# Patient Record
Sex: MALE | Race: WHITE | NOT HISPANIC OR LATINO | Employment: OTHER | URBAN - METROPOLITAN AREA
[De-identification: names, ages, dates, MRNs, and addresses within clinical notes are randomized per-mention and may not be internally consistent; named-entity substitution may affect disease eponyms.]

---

## 2023-03-24 DIAGNOSIS — E78.5 HYPERLIPIDEMIA, UNSPECIFIED: ICD-10-CM

## 2023-03-24 RX ORDER — ROSUVASTATIN CALCIUM 40 MG/1
40 TABLET, COATED ORAL DAILY
Qty: 90 TABLET | Refills: 3 | Status: SHIPPED | OUTPATIENT
Start: 2023-03-24 | End: 2024-03-23

## 2023-03-27 DIAGNOSIS — M10.9 GOUT DUE TO HYPERURICEMIA ASSOCIATED WITH MUTATION IN HPRT1 GENE: ICD-10-CM

## 2023-03-27 RX ORDER — FEBUXOSTAT 40 MG/1
40 TABLET, FILM COATED ORAL DAILY
COMMUNITY
End: 2023-03-27 | Stop reason: SDUPTHER

## 2023-03-28 RX ORDER — FEBUXOSTAT 40 MG/1
40 TABLET, FILM COATED ORAL DAILY
Qty: 90 TABLET | Refills: 0 | Status: SHIPPED | OUTPATIENT
Start: 2023-03-28 | End: 2023-11-20 | Stop reason: SDUPTHER

## 2023-03-30 ENCOUNTER — APPOINTMENT (OUTPATIENT)
Dept: LAB | Facility: LAB | Age: 73
End: 2023-03-30
Payer: MEDICARE

## 2023-03-30 LAB — THYROTROPIN (MIU/L) IN SER/PLAS BY DETECTION LIMIT <= 0.05 MIU/L: 0.78 MIU/L (ref 0.44–3.98)

## 2023-04-06 DIAGNOSIS — M10.9 GOUT DUE TO HYPERURICEMIA ASSOCIATED WITH MUTATION IN HPRT1 GENE: ICD-10-CM

## 2023-04-24 ENCOUNTER — PATIENT OUTREACH (OUTPATIENT)
Dept: PRIMARY CARE | Facility: CLINIC | Age: 73
End: 2023-04-24
Payer: MEDICARE

## 2023-04-24 DIAGNOSIS — M10.00 IDIOPATHIC GOUT, UNSPECIFIED CHRONICITY, UNSPECIFIED SITE: ICD-10-CM

## 2023-04-24 DIAGNOSIS — E87.20 METABOLIC ACIDOSIS: ICD-10-CM

## 2023-04-24 DIAGNOSIS — E78.5 HYPERLIPIDEMIA, UNSPECIFIED HYPERLIPIDEMIA TYPE: ICD-10-CM

## 2023-04-24 DIAGNOSIS — N19 UREMIA: ICD-10-CM

## 2023-04-24 DIAGNOSIS — N18.5 CKD (CHRONIC KIDNEY DISEASE), STAGE V (MULTI): ICD-10-CM

## 2023-04-24 RX ORDER — ASPIRIN 81 MG/1
81 TABLET ORAL DAILY
COMMUNITY

## 2023-04-24 RX ORDER — LEVOTHYROXINE SODIUM 150 UG/1
150 TABLET ORAL
COMMUNITY
End: 2023-08-16 | Stop reason: SDUPTHER

## 2023-04-24 RX ORDER — LABETALOL 100 MG/1
150 TABLET, FILM COATED ORAL 2 TIMES DAILY
COMMUNITY

## 2023-04-24 RX ORDER — SODIUM BICARBONATE 650 MG/1
650 TABLET ORAL 2 TIMES DAILY
COMMUNITY

## 2023-04-24 RX ORDER — DOXAZOSIN 1 MG/1
1 TABLET ORAL NIGHTLY
COMMUNITY

## 2023-04-24 RX ORDER — FUROSEMIDE 80 MG/1
80 TABLET ORAL EVERY OTHER DAY
COMMUNITY

## 2023-04-24 RX ORDER — AMLODIPINE BESYLATE 10 MG/1
10 TABLET ORAL DAILY
COMMUNITY
End: 2023-07-24

## 2023-04-24 RX ORDER — FERROUS SULFATE, DRIED 160(50) MG
1 TABLET, EXTENDED RELEASE ORAL DAILY
COMMUNITY
End: 2023-10-02 | Stop reason: ALTCHOICE

## 2023-04-24 RX ORDER — SEVELAMER CARBONATE 800 MG/1
800 TABLET, FILM COATED ORAL
COMMUNITY

## 2023-04-24 NOTE — PROGRESS NOTES
Engagement  Call Start Time: 1029 (4/24/2023 10:36 AM)    Medications  Medications reviewed with patient/caregiver?: Yes (4/24/2023 10:36 AM)  Is the patient having any side effects they believe may be caused by any medication additions or changes?: No (4/24/2023 10:36 AM)  Does the patient have all medications ordered at discharge?: Yes (4/24/2023 10:36 AM)  Prescription Comments: see med list (4/24/2023 10:36 AM)  Is the patient taking all medications as directed (includes completed medication regime)?: Yes (4/24/2023 10:36 AM)  Care Management Interventions: Advised patient to call provider; Notified provider (Pt needs a refill script for Sevelamer carbonate 800mg) (4/24/2023 10:36 AM)  Medication Comments: see med list (4/24/2023 10:36 AM)  Follow Up Tasks: Script issues (4/24/2023 10:36 AM)    Appointments  Does the patient have a primary care provider?: Yes (4/24/2023 10:36 AM)  Care Management Interventions: Verified appointment date/time/provider (4/24/2023 10:36 AM)  Has the patient kept scheduled appointments due by today?: Yes (4/24/2023 10:36 AM)  Care Management Interventions: Advised patient to keep appointment (4/24/2023 10:36 AM)    Patient Teaching  Does the patient have access to their discharge instructions?: Yes (4/24/2023 10:36 AM)  Care Management Interventions: Reviewed instructions with patient (4/24/2023 10:36 AM)  What is the patient's perception of their health status since discharge?: Improving (4/24/2023 10:36 AM)    Discharge Facility:[facility]  Discharge Diagnosis:Uremia, Hyperlipidemia, Gout, Metabolic Acidosis, CKD 5  Admission Date:4/19/23  Discharge Date: 4/22/23    PCP Appointment Date:4/25/23  Specialist Appointment Date:   Hospital Encounter and Summary: not available at this time   See discharge assessment below for further details

## 2023-04-25 LAB — THYROTROPIN (MIU/L) IN SER/PLAS BY DETECTION LIMIT <= 0.05 MIU/L: 0.61 MIU/L (ref 0.44–3.98)

## 2023-04-28 ENCOUNTER — TELEPHONE (OUTPATIENT)
Dept: PRIMARY CARE | Facility: CLINIC | Age: 73
End: 2023-04-28
Payer: MEDICARE

## 2023-04-28 NOTE — TELEPHONE ENCOUNTER
Left message for patianjumnt----- Message from Christopher D'Amico, DO sent at 4/25/2023 12:45 PM EDT -----  Labs unremarkable.

## 2023-05-11 NOTE — PROGRESS NOTES
Unable to reach patient for call back after patient's follow up appointment with PCP.  LVM  with call back number for patient to call if needed.

## 2023-07-22 DIAGNOSIS — I10 HYPERTENSION, UNSPECIFIED TYPE: Primary | ICD-10-CM

## 2023-07-24 RX ORDER — AMLODIPINE BESYLATE 10 MG/1
10 TABLET ORAL DAILY
Qty: 90 TABLET | Refills: 1 | Status: SHIPPED | OUTPATIENT
Start: 2023-07-24 | End: 2024-01-20

## 2023-08-03 ENCOUNTER — APPOINTMENT (OUTPATIENT)
Dept: LAB | Facility: LAB | Age: 73
End: 2023-08-03
Payer: MEDICARE

## 2023-08-03 LAB
CYTOMEGALOVIRUS IGG ANTIBODY: NONREACTIVE
EBV INTERPRETATION: ABNORMAL
EPSTEIN-BARR VCA IGG: POSITIVE
EPSTEIN-BARR VCA IGM: NEGATIVE
EPSTEIN-BARR VIRUS EARLY ANTIGEN ANTIBODY, IGG: NEGATIVE
EPSTIEN-BARR NUCLEAR ANTIGEN AB: POSITIVE
ESTIMATED AVERAGE GLUCOSE FOR HBA1C: 82 MG/DL
HEMOGLOBIN A1C/HEMOGLOBIN TOTAL IN BLOOD: 4.5 %
HEPATITIS B VIRUS CORE AB (PRESENCE) IN SER/PLAS BY IMM: NONREACTIVE
HEPATITIS B VIRUS SURFACE AB (MIU/ML) IN SERUM: <3.1 MIU/ML
HEPATITIS B VIRUS SURFACE AG PRESENCE IN SERUM: NONREACTIVE
HEPATITIS C VIRUS AB PRESENCE IN SERUM: NONREACTIVE
HIV 1/ 2 AG/AB SCREEN: NONREACTIVE
PROSTATE SPECIFIC AG (NG/ML) IN SER/PLAS: 4.4 NG/ML (ref 0–4)
SYPHILIS TOTAL AB: NONREACTIVE

## 2023-08-07 LAB
AMPHETAMINE SCREEN BLOOD: NEGATIVE NG/ML
BARBITURATE SCREEN BLOOD: NEGATIVE NG/ML
BENZODIAZEPINES SCREEN BLOOD: NEGATIVE NG/ML
BUPRENORPHINE SCREEN BLOOD: NEGATIVE NG/ML
CANNABINOIDS SCREEN BLOOD: NEGATIVE NG/ML
COCAINE SCREEN BLOOD: NEGATIVE NG/ML
COTININE BLOOD QUANTITATIVE: <5 NG/ML
DRUG SCREEN COMMENT BLOOD: NORMAL
METHADONE SCREEN BLOOD: NEGATIVE NG/ML
METHAMPHETAMINE, BLOOD, SCREEN: NEGATIVE NG/ML
NICOTINE BLOOD QUANTITATIVE: <5 NG/ML
NIL(NEG) CONTROL SPOT COUNT: NORMAL
OPIATE SCREEN BLOOD: NEGATIVE NG/ML
OXYCODONE SCREEN BLOOD: NEGATIVE NG/ML
PANEL A SPOT COUNT: 0
PANEL B SPOT COUNT: 0
PHENCYCLIDINE SCREEN BLOOD: NEGATIVE NG/ML
POS CONTROL SPOT COUNT: NORMAL
T-SPOT. TB INTERPRETATION: NEGATIVE

## 2023-08-16 DIAGNOSIS — E03.9 HYPOTHYROIDISM, UNSPECIFIED TYPE: ICD-10-CM

## 2023-08-16 RX ORDER — LEVOTHYROXINE SODIUM 150 UG/1
150 TABLET ORAL
Qty: 90 TABLET | Refills: 1 | Status: SHIPPED | OUTPATIENT
Start: 2023-08-16 | End: 2024-02-07

## 2023-09-14 DIAGNOSIS — E58 CALCIUM DEFICIENCY: ICD-10-CM

## 2023-09-14 RX ORDER — CALCITRIOL 0.25 UG/1
0.25 CAPSULE ORAL 2 TIMES DAILY
Qty: 60 CAPSULE | Refills: 11 | Status: SHIPPED | OUTPATIENT
Start: 2023-09-14

## 2023-09-14 RX ORDER — CALCITRIOL 0.25 UG/1
CAPSULE ORAL EVERY 24 HOURS
COMMUNITY
End: 2023-09-14 | Stop reason: SDUPTHER

## 2023-09-25 DIAGNOSIS — Z00.00 HEALTHCARE MAINTENANCE: ICD-10-CM

## 2023-09-25 RX ORDER — CLOBETASOL PROPIONATE 0.5 MG/G
CREAM TOPICAL 3 TIMES DAILY
COMMUNITY
Start: 2022-10-25 | End: 2023-09-25 | Stop reason: SDUPTHER

## 2023-09-26 RX ORDER — CLOBETASOL PROPIONATE 0.5 MG/G
CREAM TOPICAL 3 TIMES DAILY
Qty: 60 G | Refills: 0 | Status: ON HOLD | OUTPATIENT
Start: 2023-09-26 | End: 2023-10-30

## 2023-10-02 ENCOUNTER — TELEPHONE (OUTPATIENT)
Dept: GASTROENTEROLOGY | Facility: HOSPITAL | Age: 73
End: 2023-10-02
Payer: MEDICARE

## 2023-10-02 VITALS — HEIGHT: 66 IN | BODY MASS INDEX: 24.91 KG/M2 | WEIGHT: 155 LBS

## 2023-10-02 RX ORDER — CHOLECALCIFEROL (VITAMIN D3) 50 MCG
50 TABLET ORAL DAILY
COMMUNITY

## 2023-10-03 ENCOUNTER — LAB REQUISITION (OUTPATIENT)
Dept: LAB | Facility: CLINIC | Age: 73
End: 2023-10-03
Payer: MEDICARE

## 2023-10-03 DIAGNOSIS — N18.6 END STAGE RENAL DISEASE (MULTI): ICD-10-CM

## 2023-10-04 PROBLEM — H91.90 HEARING DEFICIT: Status: ACTIVE | Noted: 2023-10-04

## 2023-10-04 PROBLEM — H93.13 SUBJECTIVE TINNITUS OF BOTH EARS: Status: ACTIVE | Noted: 2023-10-04

## 2023-10-04 PROBLEM — B02.9 SHINGLES: Status: ACTIVE | Noted: 2023-10-04

## 2023-10-04 PROBLEM — N18.6 ESRD (END STAGE RENAL DISEASE) (MULTI): Status: ACTIVE | Noted: 2023-10-04

## 2023-10-04 PROBLEM — R04.0 EPISTAXIS: Status: ACTIVE | Noted: 2023-10-04

## 2023-10-04 PROBLEM — R39.9 LOWER URINARY TRACT SYMPTOMS (LUTS): Status: ACTIVE | Noted: 2023-10-04

## 2023-10-04 PROBLEM — H52.03 HYPEROPIA OF BOTH EYES: Status: ACTIVE | Noted: 2023-10-04

## 2023-10-04 PROBLEM — H02.839: Status: ACTIVE | Noted: 2023-10-04

## 2023-10-04 PROBLEM — F17.201 NICOTINE DEPENDENCE IN REMISSION: Status: ACTIVE | Noted: 2023-10-04

## 2023-10-04 PROBLEM — E83.30 DISORDER OF PHOSPHORUS METABOLISM: Status: ACTIVE | Noted: 2023-10-04

## 2023-10-04 PROBLEM — H43.813 PVD (POSTERIOR VITREOUS DETACHMENT), BOTH EYES: Status: ACTIVE | Noted: 2023-10-04

## 2023-10-04 PROBLEM — N18.5 STAGE 5 CHRONIC KIDNEY DISEASE (MULTI): Status: ACTIVE | Noted: 2023-10-04

## 2023-10-04 PROBLEM — R35.1 BENIGN PROSTATIC HYPERPLASIA WITH NOCTURIA: Status: ACTIVE | Noted: 2023-10-04

## 2023-10-04 PROBLEM — H52.4 BILATERAL PRESBYOPIA: Status: ACTIVE | Noted: 2023-10-04

## 2023-10-04 PROBLEM — H25.13 AGE-RELATED NUCLEAR CATARACT OF BOTH EYES: Status: ACTIVE | Noted: 2023-10-04

## 2023-10-04 PROBLEM — E55.9 VITAMIN D DEFICIENCY: Status: ACTIVE | Noted: 2023-10-04

## 2023-10-04 PROBLEM — N18.4 STAGE 4 CHRONIC KIDNEY DISEASE (MULTI): Status: ACTIVE | Noted: 2023-10-04

## 2023-10-04 PROBLEM — N19 UREMIA: Status: ACTIVE | Noted: 2023-10-04

## 2023-10-04 PROBLEM — Z94.9 TRANSPLANT: Status: ACTIVE | Noted: 2023-10-04

## 2023-10-04 PROBLEM — H54.7 DECREASED VISUAL ACUITY: Status: ACTIVE | Noted: 2023-10-04

## 2023-10-04 PROBLEM — N18.6 END-STAGE RENAL DISEASE ON HEMODIALYSIS (MULTI): Status: ACTIVE | Noted: 2023-10-04

## 2023-10-04 PROBLEM — H35.372 EPIRETINAL MEMBRANE (ERM) OF LEFT EYE: Status: ACTIVE | Noted: 2023-10-04

## 2023-10-04 PROBLEM — Z85.72 HISTORY OF CUTANEOUS T-CELL LYMPHOMA: Status: ACTIVE | Noted: 2023-10-04

## 2023-10-04 PROBLEM — M10.9 GOUT: Status: ACTIVE | Noted: 2023-10-04

## 2023-10-04 PROBLEM — I10 ESSENTIAL HYPERTENSION: Status: ACTIVE | Noted: 2023-10-04

## 2023-10-04 PROBLEM — N40.1 BENIGN PROSTATIC HYPERPLASIA WITH NOCTURIA: Status: ACTIVE | Noted: 2023-10-04

## 2023-10-04 PROBLEM — J84.10 CALCIFIED GRANULOMA OF LUNG (MULTI): Status: ACTIVE | Noted: 2023-10-04

## 2023-10-04 PROBLEM — E03.9 HYPOTHYROIDISM: Status: ACTIVE | Noted: 2023-10-04

## 2023-10-04 PROBLEM — Z99.2 END-STAGE RENAL DISEASE ON HEMODIALYSIS (MULTI): Status: ACTIVE | Noted: 2023-10-04

## 2023-10-04 PROBLEM — E78.5 HLD (HYPERLIPIDEMIA): Status: ACTIVE | Noted: 2023-10-04

## 2023-10-04 PROBLEM — N25.81 SECONDARY HYPERPARATHYROIDISM OF RENAL ORIGIN (MULTI): Status: ACTIVE | Noted: 2023-10-04

## 2023-10-04 PROBLEM — H90.3 BILATERAL SENSORINEURAL HEARING LOSS: Status: ACTIVE | Noted: 2023-10-04

## 2023-10-04 PROBLEM — I77.0 AVF (ARTERIOVENOUS FISTULA) (CMS-HCC): Status: ACTIVE | Noted: 2023-10-04

## 2023-10-04 PROBLEM — D63.8 ANEMIA OF CHRONIC DISEASE: Status: ACTIVE | Noted: 2023-10-04

## 2023-10-04 PROBLEM — J98.4 CALCIFIED GRANULOMA OF LUNG: Status: ACTIVE | Noted: 2023-10-04

## 2023-10-04 PROBLEM — E11.9 TYPE 2 DIABETES MELLITUS WITHOUT COMPLICATION (MULTI): Status: ACTIVE | Noted: 2023-10-04

## 2023-10-04 RX ORDER — ETHYL CHLORIDE 100 %
AEROSOL, SPRAY (ML) TOPICAL
COMMUNITY
Start: 2023-07-10

## 2023-10-04 RX ORDER — FLUOCINOLONE ACETONIDE 0.1 MG/G
CREAM TOPICAL
COMMUNITY

## 2023-10-04 RX ORDER — ALFUZOSIN HYDROCHLORIDE 10 MG/1
10 TABLET, EXTENDED RELEASE ORAL DAILY
COMMUNITY
End: 2024-01-09 | Stop reason: SDUPTHER

## 2023-10-04 RX ORDER — HYDROCORTISONE 25 MG/G
CREAM TOPICAL
COMMUNITY
Start: 2021-08-25

## 2023-10-04 RX ORDER — SODIUM PICOSULFATE, MAGNESIUM OXIDE, AND ANHYDROUS CITRIC ACID 12; 3.5; 1 G/175ML; G/175ML; MG/175ML
LIQUID ORAL
COMMUNITY
Start: 2023-09-28 | End: 2024-02-07 | Stop reason: ALTCHOICE

## 2023-10-04 RX ORDER — EPLERENONE 25 MG/1
1 TABLET, FILM COATED ORAL DAILY
COMMUNITY
Start: 2022-06-21

## 2023-10-04 RX ORDER — HYDROCORTISONE 10 MG/ML
LOTION TOPICAL
COMMUNITY
Start: 2022-10-25

## 2023-10-04 RX ORDER — FERROUS SULFATE 325(65) MG
1 TABLET ORAL
COMMUNITY

## 2023-10-04 RX ORDER — VALSARTAN 160 MG/1
1 TABLET ORAL DAILY
COMMUNITY
Start: 2021-03-19

## 2023-10-05 ENCOUNTER — ANESTHESIA (OUTPATIENT)
Dept: GASTROENTEROLOGY | Facility: HOSPITAL | Age: 73
End: 2023-10-05
Payer: MEDICARE

## 2023-10-05 ENCOUNTER — HOSPITAL ENCOUNTER (OUTPATIENT)
Dept: GASTROENTEROLOGY | Facility: HOSPITAL | Age: 73
Discharge: HOME | End: 2023-10-05
Attending: INTERNAL MEDICINE | Admitting: INTERNAL MEDICINE
Payer: MEDICARE

## 2023-10-05 ENCOUNTER — ANESTHESIA EVENT (OUTPATIENT)
Dept: GASTROENTEROLOGY | Facility: HOSPITAL | Age: 73
End: 2023-10-05
Payer: MEDICARE

## 2023-10-05 VITALS
HEIGHT: 66 IN | DIASTOLIC BLOOD PRESSURE: 59 MMHG | TEMPERATURE: 97.9 F | SYSTOLIC BLOOD PRESSURE: 130 MMHG | RESPIRATION RATE: 12 BRPM | BODY MASS INDEX: 24.91 KG/M2 | HEART RATE: 59 BPM | OXYGEN SATURATION: 99 % | WEIGHT: 155 LBS

## 2023-10-05 DIAGNOSIS — Z12.11 ENCOUNTER FOR SCREENING FOR MALIGNANT NEOPLASM OF COLON: Primary | ICD-10-CM

## 2023-10-05 DIAGNOSIS — Z86.010 PERSONAL HISTORY OF COLONIC POLYPS: ICD-10-CM

## 2023-10-05 PROCEDURE — A45378 PR COLONOSCOPY,DIAGNOSTIC: Performed by: ANESTHESIOLOGY

## 2023-10-05 PROCEDURE — 3700000002 HC GENERAL ANESTHESIA TIME - EACH INCREMENTAL 1 MINUTE

## 2023-10-05 PROCEDURE — 7100000009 HC PHASE TWO TIME - INITIAL BASE CHARGE

## 2023-10-05 PROCEDURE — 45385 COLONOSCOPY W/LESION REMOVAL: CPT | Performed by: INTERNAL MEDICINE

## 2023-10-05 PROCEDURE — 88305 TISSUE EXAM BY PATHOLOGIST: CPT | Mod: TC,SUR,AHULAB | Performed by: INTERNAL MEDICINE

## 2023-10-05 PROCEDURE — 3700000001 HC GENERAL ANESTHESIA TIME - INITIAL BASE CHARGE

## 2023-10-05 PROCEDURE — 88305 TISSUE EXAM BY PATHOLOGIST: CPT | Mod: TC,AHULAB | Performed by: INTERNAL MEDICINE

## 2023-10-05 PROCEDURE — 99100 ANES PT EXTEME AGE<1 YR&>70: CPT | Performed by: ANESTHESIOLOGY

## 2023-10-05 PROCEDURE — A45378 PR COLONOSCOPY,DIAGNOSTIC: Performed by: NURSE ANESTHETIST, CERTIFIED REGISTERED

## 2023-10-05 PROCEDURE — 7100000010 HC PHASE TWO TIME - EACH INCREMENTAL 1 MINUTE

## 2023-10-05 PROCEDURE — 2500000004 HC RX 250 GENERAL PHARMACY W/ HCPCS (ALT 636 FOR OP/ED): Performed by: NURSE ANESTHETIST, CERTIFIED REGISTERED

## 2023-10-05 PROCEDURE — 3600000007 HC OR TIME - EACH INCREMENTAL 1 MINUTE - PROCEDURE LEVEL TWO

## 2023-10-05 PROCEDURE — 88305 TISSUE EXAM BY PATHOLOGIST: CPT | Performed by: PATHOLOGY

## 2023-10-05 RX ORDER — SODIUM CHLORIDE, SODIUM LACTATE, POTASSIUM CHLORIDE, CALCIUM CHLORIDE 600; 310; 30; 20 MG/100ML; MG/100ML; MG/100ML; MG/100ML
20 INJECTION, SOLUTION INTRAVENOUS CONTINUOUS
Status: CANCELLED | OUTPATIENT
Start: 2023-10-05

## 2023-10-05 RX ORDER — PROPOFOL 10 MG/ML
INJECTION, EMULSION INTRAVENOUS CONTINUOUS PRN
Status: DISCONTINUED | OUTPATIENT
Start: 2023-10-05 | End: 2023-10-05

## 2023-10-05 RX ORDER — PROPOFOL 10 MG/ML
INJECTION, EMULSION INTRAVENOUS AS NEEDED
Status: DISCONTINUED | OUTPATIENT
Start: 2023-10-05 | End: 2023-10-05

## 2023-10-05 RX ADMIN — SODIUM CHLORIDE: 9 INJECTION, SOLUTION INTRAVENOUS at 13:14

## 2023-10-05 RX ADMIN — PROPOFOL 40 MG: 10 INJECTION, EMULSION INTRAVENOUS at 13:27

## 2023-10-05 RX ADMIN — PROPOFOL 200 MCG/KG/MIN: 10 INJECTION, EMULSION INTRAVENOUS at 13:27

## 2023-10-05 ASSESSMENT — PAIN SCALES - GENERAL
PAINLEVEL_OUTOF10: 0 - NO PAIN

## 2023-10-05 ASSESSMENT — PAIN - FUNCTIONAL ASSESSMENT
PAIN_FUNCTIONAL_ASSESSMENT: UNABLE TO SELF-REPORT
PAIN_FUNCTIONAL_ASSESSMENT: 0-10
PAIN_FUNCTIONAL_ASSESSMENT: 0-10

## 2023-10-05 NOTE — DISCHARGE INSTRUCTIONS

## 2023-10-05 NOTE — ANESTHESIA POSTPROCEDURE EVALUATION
Patient: Salazar Jesus    Procedure Summary       Date: 10/05/23 Room / Location[place] [facility] [place]    Anesthesia Start: 1314 Anesthesia Stop: 1408    Procedure: COLONOSCOPY Diagnosis:       Personal history of colonic polyps      Encounter for screening for malignant neoplasm of colon    Scheduled Providers: Terry Hutson MD; Mike Hagan MD; Beni Blanchard RN; Courtney Guerra MA Responsible Provider: Mike Hagan MD    Anesthesia Type: MAC ASA Status: 3            Anesthesia Type: MAC    Vitals Value Taken Time   /59 10/05/23 1430   Temp 36.6 °C (97.9 °F) 10/05/23 1404   Pulse 59 10/05/23 1430   Resp 12 10/05/23 1430   SpO2 99 % 10/05/23 1430       Anesthesia Post Evaluation    Patient location during evaluation: PACU  Patient participation: complete - patient participated  Level of consciousness: awake and alert  Pain management: satisfactory to patient  Multimodal analgesia pain management approach  Airway patency: patent  Cardiovascular status: acceptable and blood pressure returned to baseline  Respiratory status: acceptable  Hydration status: acceptable        No notable events documented.

## 2023-10-05 NOTE — H&P
GASTROENTEROLOGY PRE-PROCEDURE H&P    HPI:  Salazar Jesus is a 73 y.o. male here for surveillance colonoscopy for personal history of adenomatous colon polyps.    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    SOCIAL HISTORY  Social History     Tobacco Use    Smoking status: Never    Smokeless tobacco: Never   Substance Use Topics    Alcohol use: Not on file       REVIEW OF SYSTEMS  A 10+ point review of systems was completed and was otherwise negative.    ALLERGIES  Allergies   Allergen Reactions    Allopurinol Itching       MEDICATIONS  Current Outpatient Medications   Medication Instructions    alfuzosin (UROXATRAL) 10 mg, oral, Daily    amLODIPine (NORVASC) 10 mg, oral, Daily    aspirin 81 mg, oral, Daily    calcitriol (ROCALTROL) 0.25 mcg, oral, 2 times daily    cholecalciferol (VITAMIN D3) 50 mcg, oral, Daily    Clenpiq 10 mg-3.5 gram- 12 gram/175 mL solution PLEASE SEE ATTACHED FOR DETAILED DIRECTIONS    clobetasol (Temovate) 0.05 % cream Topical, 3 times daily    doxazosin (CARDURA) 1 mg, oral, Nightly    eplerenone (Inspra) 25 mg tablet 1 tablet, oral, Daily    ethyl chloride 100 % spray 1 hour before dialysis    febuxostat (ULORIC) 40 mg, oral, Daily    ferrous sulfate 325 (65 Fe) MG tablet 1 tablet, oral    fluocinolone 0.01 % cream APPLY TO AFFECTED AREAS ONCE A DAY ON THIGHS AND LEGS X 2 WEEKS THEN AS NEEDED FOR FLARES    furosemide (LASIX) 80 mg, oral, Every  other day    hydrocortisone 1 % lotion APPLY SPARINGLY AND RUB IN WELL TO  AFFECTED AREA(S) AS DIRECTED.    hydrocortisone 2.5 % cream Hydrocortisone 2.5 % External Cream  Quantity: 56   Refills: 0  Start: 25-Aug-2021    labetalol (NORMODYNE) 150 mg, oral, 2 times daily    levothyroxine (SYNTHROID, LEVOXYL) 150 mcg, oral, Daily before breakfast    rosuvastatin (CRESTOR) 40 mg, oral, Daily    sevelamer carbonate (RENVELA) 800 mg, oral, 3 times daily with meals, Swallow tablet whole; do not crush, break, or chew.    sodium bicarbonate 650 mg, oral, 2 times daily    valsartan (Diovan) 160 mg tablet 1 tablet, oral, Daily       VITALS  There were no vitals taken for this visit.     PHYSICAL EXAM  CONSTITUTIONAL: no acute distress, appears stated age  PULMONARY: clear to auscultation bilaterally  CARDIOVASCULAR: regular rate and rhythm  ABDOMEN: soft, non-tender  NEUROLOGIC: alert and oriented to person/place/time     ASSESSMENT/PLAN    Proceed with colonoscopy as scheduled.    Signature: Terry Hutson MD

## 2023-10-05 NOTE — ANESTHESIA PREPROCEDURE EVALUATION
Patient: Salazar Jesus    Procedure Information       Date/Time: 10/05/23 1300    Scheduled providers: Terry Hutson MD; Mike Hagan MD    Procedure: COLONOSCOPY    Location: [facility]            Relevant Problems   Cardiovascular   (+) Essential hypertension   (+) HLD (hyperlipidemia)      Endocrine   (+) Hypothyroidism   (+) Secondary hyperparathyroidism of renal origin (CMS/HCC)   (+) Type 2 diabetes mellitus without complication (CMS/HCC)      /Renal   (+) ESRD (end stage renal disease) (CMS/[facility])   (+) End-stage renal disease on hemodialysis (CMS/[facility])      Hematology   (+) Anemia of chronic disease      Eyes, Ears, Nose, and Throat   (+) Bilateral sensorineural hearing loss   (+) Hearing deficit      Infectious Disease   (+) Shingles       Clinical information reviewed:   Tobacco  Allergies  Meds   Med Hx  Surg Hx   Fam Hx  Soc Hx        NPO/Void Status  Date of Last Liquid: 10/05/23  Time of Last Liquid: 1000  Date of Last Solid: 10/03/23  Time of Last Solid: 1900  Time of Last Void: 1212           Past Medical History:   Diagnosis Date    BPH (benign prostatic hyperplasia)     Dental crown present     loose; on upper left incisor    ESRD (end stage renal disease) on dialysis (CMS/[facility])     Dialysis MWF    Gout     HTN (hypertension)     Hyperlipidemia     Hypothyroidism     ALLISON on CPAP       Past Surgical History:   Procedure Laterality Date    KNEE CARTILAGE SURGERY      Knee surgery    ROTATOR CUFF REPAIR  2017    THROAT SURGERY      w/ broken jaw    WISDOM TOOTH EXTRACTION Bilateral      Social History     Tobacco Use    Smoking status: Never    Smokeless tobacco: Never   Vaping Use    Vaping Use: Unknown   Substance Use Topics    Alcohol use: Not Currently    Drug use: Never      Current Outpatient Medications   Medication Instructions    alfuzosin (UROXATRAL) 10 mg, oral, Daily    amLODIPine (NORVASC) 10 mg, oral, Daily    aspirin 81 mg, oral, Daily    calcitriol (ROCALTROL)  0.25 mcg, oral, 2 times daily    cholecalciferol (VITAMIN D3) 50 mcg, oral, Daily    Clenpiq 10 mg-3.5 gram- 12 gram/175 mL solution PLEASE SEE ATTACHED FOR DETAILED DIRECTIONS    clobetasol (Temovate) 0.05 % cream Topical, 3 times daily    doxazosin (CARDURA) 1 mg, oral, Nightly    eplerenone (Inspra) 25 mg tablet 1 tablet, oral, Daily    ethyl chloride 100 % spray 1 hour before dialysis    febuxostat (ULORIC) 40 mg, oral, Daily    ferrous sulfate 325 (65 Fe) MG tablet 1 tablet, oral    fluocinolone 0.01 % cream APPLY TO AFFECTED AREAS ONCE A DAY ON THIGHS AND LEGS X 2 WEEKS THEN AS NEEDED FOR FLARES    furosemide (LASIX) 80 mg, oral, Every other day    hydrocortisone 1 % lotion APPLY SPARINGLY AND RUB IN WELL TO  AFFECTED AREA(S) AS DIRECTED.    hydrocortisone 2.5 % cream Hydrocortisone 2.5 % External Cream  Quantity: 56   Refills: 0  Start: 25-Aug-2021    labetalol (NORMODYNE) 150 mg, oral, 2 times daily    levothyroxine (SYNTHROID, LEVOXYL) 150 mcg, oral, Daily before breakfast    rosuvastatin (CRESTOR) 40 mg, oral, Daily    sevelamer carbonate (RENVELA) 800 mg, oral, 3 times daily with meals, Swallow tablet whole; do not crush, break, or chew.    sodium bicarbonate 650 mg, oral, 2 times daily    valsartan (Diovan) 160 mg tablet 1 tablet, oral, Daily      Allergies   Allergen Reactions    Allopurinol Itching        Chemistry    Lab Results   Component Value Date/Time     04/22/2023 0639    K 3.7 04/22/2023 0639     04/22/2023 0639    CO2 24 04/22/2023 0639    BUN 41 (H) 04/22/2023 0639    CREATININE 5.2 (H) 04/22/2023 0639    Lab Results   Component Value Date/Time    CALCIUM 9.5 04/22/2023 0639    ALKPHOS 59 04/19/2023 0538    AST 24 04/19/2023 0538    ALT 23 04/19/2023 0538    BILITOT 0.4 04/19/2023 0538          Lab Results   Component Value Date/Time    WBC 5.2 04/22/2023 0639    HGB 9.1 (L) 04/22/2023 0639    HCT 26.8 (L) 04/22/2023 0639     (L) 04/22/2023 0639     Lab Results   Component  Value Date/Time    PROTIME 10.5 04/21/2023 0540    INR 1.0 04/21/2023 0540     No results found for this or any previous visit (from the past 4464 hour(s)).  No results found for this or any previous visit from the past 1095 days.   Echo 8/3/2023:  Left Ventricle: The left ventricular systolic function is normal, with an estimated ejection fraction of 60-65%. There are no regional wall motion abnormalities. The left ventricular cavity size is normal. Spectral Doppler shows an impaired relaxation pattern of left ventricular diastolic filling.  Left Atrium: The left atrium is normal in size.  Right Ventricle: The right ventricle is normal in size. There is normal right ventricular global systolic function.  Right Atrium: The right atrium is normal in size.  Aortic Valve: The aortic valve appears structurally normal. There is trivial aortic valve regurgitation. The peak instantaneous gradient of the aortic valve is 6.6 mmHg.  Mitral Valve: The mitral valve is normal in structure. There is trace mitral valve regurgitation.  Tricuspid Valve: The tricuspid valve is structurally normal. No evidence of tricuspid regurgitation.  Pulmonic Valve: The pulmonic valve is structurally normal. There is no indication of pulmonic valve regurgitation.  Pericardium: There is no pericardial effusion noted.  Aorta: The aortic root is normal.  Systemic Veins: The inferior vena cava appears to be of normal size. There is IVC inspiratory collapse greater than 50%.  In comparison to the previous echocardiogram(s): Compared with study from 6/8/2022, no significant change.  CONCLUSIONS:  1. Left ventricular systolic function is normal with a 60-65% estimated ejection fraction.  2. Spectral Doppler shows an impaired relaxation pattern of left ventricular diastolic filling.     Stress 7/20/2023:  FINDINGS:  Stress and rest images both demonstrate a mild fixed defect along the  inferior wall which appears to move in thickened appropriately.    "  ECG-gated images demonstrate normal LV size and myocardial  contractility with an LV ejection fraction of   64 % (normal above 45  percent).     IMPRESSION:  1. No definite evidence of ischemia or infarct.  2. Likely diaphragmatic attenuation along the inferior wall.  3. Well-maintained left ventricular function with an ejection  fraction of 64%.  4. Normal left ventricular size.         Visit Vitals  BP (!) 155/92   Pulse 88   Temp 36.2 °C (97.2 °F) (Temporal)   Resp 18   Ht 1.676 m (5' 6\")   Wt 70.3 kg (155 lb)   SpO2 100%   BMI 25.02 kg/m²   Smoking Status Never   BSA 1.81 m²        Anesthesia Evaluation      No history of anesthetic complications   Airway   Mallampati: III  TM distance: >3 FB  Neck ROM: full  Dental - normal exam         Pulmonary     breath sounds clear to auscultation  Cardiovascular     Rhythm: regular  Rate: normal    Neuro/Psych      GI/Hepatic/Renal      Endo/Other    Abdominal  - normal exam                      Physical Exam    Airway  Mallampati: III  TM distance: >3 FB  Neck ROM: full     Cardiovascular   Rhythm: regular  Rate: normal     Dental - normal exam       Pulmonary   Breath sounds clear to auscultation     Abdominal - normal exam              Anesthesia Plan    ASA 3     MAC     intravenous induction   Anesthetic plan and risks discussed with patient.       "

## 2023-10-05 NOTE — PERIOPERATIVE NURSING NOTE
Discharge instructions reviewed with patient and wife, verbalized understanding. Waiting for transport.

## 2023-10-10 LAB
LABORATORY COMMENT REPORT: NORMAL
PATH REPORT.FINAL DX SPEC: NORMAL
PATH REPORT.GROSS SPEC: NORMAL
PATH REPORT.TOTAL CANCER: NORMAL

## 2023-10-11 LAB
HLA CLASS I AB SCREEN,FC: NORMAL
HLA CLASS II AB SCREEN,FC: NORMAL
HLA RESULTS: NORMAL

## 2023-10-18 ENCOUNTER — TELEPHONE (OUTPATIENT)
Dept: GASTROENTEROLOGY | Facility: CLINIC | Age: 73
End: 2023-10-18
Payer: MEDICARE

## 2023-10-18 NOTE — TELEPHONE ENCOUNTER
Path Note    Impression  4 subcentimeter polyps were removed  Diverticulosis of mild severity in the sigmoid colon  Small hemorrhoids    A.  Rectum, polypectomies:  - Tubular adenoma.  - Mucosal prolapse polyp.     B.  Sigmoid colon, polypectomies:  - Tubular adenoma.  - Hyperplastic polyp.    Recommend surveillance colonoscopy in 5 (five) years.  Left patient voicemail.

## 2023-10-27 DIAGNOSIS — Z00.00 HEALTHCARE MAINTENANCE: ICD-10-CM

## 2023-10-30 RX ORDER — CLOBETASOL PROPIONATE 0.5 MG/G
CREAM TOPICAL
Qty: 60 G | Refills: 0 | Status: SHIPPED | OUTPATIENT
Start: 2023-10-30

## 2023-11-20 DIAGNOSIS — M10.9 GOUT DUE TO HYPERURICEMIA ASSOCIATED WITH MUTATION IN HPRT1 GENE: ICD-10-CM

## 2023-11-20 RX ORDER — FEBUXOSTAT 40 MG/1
40 TABLET, FILM COATED ORAL DAILY
Qty: 90 TABLET | Refills: 0 | Status: SHIPPED | OUTPATIENT
Start: 2023-11-20 | End: 2024-02-15

## 2023-12-04 PROCEDURE — 86808 CYTOTOXIC ANTIBODY SCREENING: CPT | Mod: OUT | Performed by: SURGERY

## 2023-12-26 ENCOUNTER — LAB REQUISITION (OUTPATIENT)
Dept: LAB | Facility: CLINIC | Age: 73
End: 2023-12-26
Payer: MEDICARE

## 2023-12-26 DIAGNOSIS — N18.6 END STAGE RENAL DISEASE (MULTI): ICD-10-CM

## 2023-12-26 LAB — FREEZE CROSSMATCH: NORMAL

## 2024-01-03 ENCOUNTER — LAB REQUISITION (OUTPATIENT)
Dept: LAB | Facility: CLINIC | Age: 74
End: 2024-01-03
Payer: MEDICARE

## 2024-01-03 DIAGNOSIS — N18.6 END STAGE RENAL DISEASE (MULTI): ICD-10-CM

## 2024-01-09 ENCOUNTER — OFFICE VISIT (OUTPATIENT)
Dept: UROLOGY | Facility: CLINIC | Age: 74
End: 2024-01-09
Payer: MEDICARE

## 2024-01-09 VITALS — HEIGHT: 65 IN | WEIGHT: 150 LBS | TEMPERATURE: 96.8 F | BODY MASS INDEX: 24.99 KG/M2

## 2024-01-09 DIAGNOSIS — R35.1 BENIGN PROSTATIC HYPERPLASIA WITH NOCTURIA: Primary | ICD-10-CM

## 2024-01-09 DIAGNOSIS — N40.1 BENIGN PROSTATIC HYPERPLASIA WITH NOCTURIA: Primary | ICD-10-CM

## 2024-01-09 PROCEDURE — 1036F TOBACCO NON-USER: CPT | Performed by: UROLOGY

## 2024-01-09 PROCEDURE — 1159F MED LIST DOCD IN RCRD: CPT | Performed by: UROLOGY

## 2024-01-09 PROCEDURE — 51798 US URINE CAPACITY MEASURE: CPT | Performed by: UROLOGY

## 2024-01-09 PROCEDURE — 4010F ACE/ARB THERAPY RXD/TAKEN: CPT | Performed by: UROLOGY

## 2024-01-09 PROCEDURE — 99214 OFFICE O/P EST MOD 30 MIN: CPT | Performed by: UROLOGY

## 2024-01-09 PROCEDURE — 51741 ELECTRO-UROFLOWMETRY FIRST: CPT | Performed by: UROLOGY

## 2024-01-09 PROCEDURE — 1126F AMNT PAIN NOTED NONE PRSNT: CPT | Performed by: UROLOGY

## 2024-01-09 RX ORDER — ALFUZOSIN HYDROCHLORIDE 10 MG/1
10 TABLET, EXTENDED RELEASE ORAL DAILY
Qty: 90 TABLET | Refills: 3 | Status: SHIPPED | OUTPATIENT
Start: 2024-01-09 | End: 2025-01-08

## 2024-01-09 ASSESSMENT — PAIN SCALES - GENERAL: PAINLEVEL: 0-NO PAIN

## 2024-01-09 NOTE — PROGRESS NOTES
Subjective   Salazar Jesus is a 73 y.o. male with BPH with LUTs on Alfuzosin presenting today for a follow up visit. Patient reports occasional urinary frequency and urgency and nocturia x3. He has had complete resolution of all obstructive urinary symptoms. Patient is overall satisfied with urinary symptoms. He denies flank pain, gross hematuria, kidney stones, and recurrent UTI.    Objective   Past Medical History:   Diagnosis Date    BPH (benign prostatic hyperplasia)     Dental crown present     loose; on upper left incisor    ESRD (end stage renal disease) on dialysis (CMS/[facility])     Dialysis MWF    Gout     HTN (hypertension)     Hyperlipidemia     Hypothyroidism     ALLISON on CPAP      Past Surgical History:   Procedure Laterality Date    KNEE CARTILAGE SURGERY      Knee surgery    ROTATOR CUFF REPAIR  2017    THROAT SURGERY      w/ broken jaw    WISDOM TOOTH EXTRACTION Bilateral      Current Outpatient Medications on File Prior to Visit   Medication Sig Dispense Refill    amLODIPine (Norvasc) 10 mg tablet TAKE 1 TABLET BY MOUTH EVERY DAY FOR 90 DAYS 90 tablet 1    aspirin 81 mg EC tablet Take 1 tablet (81 mg) by mouth once daily.      cholecalciferol (Vitamin D3) 50 MCG (2000 UT) tablet Take 1 tablet (50 mcg) by mouth once daily.      clobetasol (Temovate) 0.05 % cream APPLY TO AFFECTED AREA 3 TIMES A DAY 60 g 0    doxazosin (Cardura) 1 mg tablet Take 1 tablet (1 mg) by mouth once daily at bedtime.      ethyl chloride 100 % spray 1 hour before dialysis      febuxostat (Uloric) 40 mg tablet Take 1 tablet (40 mg) by mouth once daily. 90 tablet 0    fluocinolone 0.01 % cream APPLY TO AFFECTED AREAS ONCE A DAY ON THIGHS AND LEGS X 2 WEEKS THEN AS NEEDED FOR FLARES      furosemide (Lasix) 80 mg tablet Take 1 tablet (80 mg) by mouth every other day.      hydrocortisone 1 % lotion APPLY SPARINGLY AND RUB IN WELL TO  AFFECTED AREA(S) AS DIRECTED.      hydrocortisone 2.5 % cream Hydrocortisone 2.5 % External  "Cream  Quantity: 56   Refills: 0  Start: 25-Aug-2021      labetalol (Normodyne) 100 mg tablet Take 1.5 tablets (150 mg) by mouth 2 times a day.      levothyroxine (Synthroid, Levoxyl) 150 mcg tablet Take 1 tablet (150 mcg) by mouth once daily in the morning. Take before meals. 90 tablet 1    rosuvastatin (Crestor) 40 mg tablet Take 1 tablet (40 mg) by mouth once daily. 90 tablet 3    sevelamer carbonate (Renvela) 800 mg tablet Take 1 tablet (800 mg) by mouth 3 times a day with meals. Swallow tablet whole; do not crush, break, or chew.      sodium bicarbonate 650 mg tablet Take 1 tablet (650 mg) by mouth 2 times a day.      alfuzosin (Uroxatral) 10 mg 24 hr tablet Take 1 tablet (10 mg) by mouth once daily.      calcitriol (Rocaltrol) 0.25 mcg capsule Take 1 capsule (0.25 mcg) by mouth 2 times a day. (Patient not taking: Reported on 1/9/2024) 60 capsule 11    Clenpiq 10 mg-3.5 gram- 12 gram/175 mL solution PLEASE SEE ATTACHED FOR DETAILED DIRECTIONS      eplerenone (Inspra) 25 mg tablet Take 1 tablet (25 mg) by mouth once daily.      ferrous sulfate 325 (65 Fe) MG tablet Take 1 tablet by mouth.      valsartan (Diovan) 160 mg tablet Take 1 tablet (160 mg) by mouth once daily.       No current facility-administered medications on file prior to visit.     Allergies   Allergen Reactions    Allopurinol Itching       Temp 36 °C (96.8 °F)   Ht 1.651 m (5' 5\")   Wt 68 kg (150 lb)   BMI 24.96 kg/m²   Physical Exam    Lab Review    Lab Results   Component Value Date    PSA 4.40 (H) 08/03/2023     IPSS 15 and 4  PVR 24ml  Uroflow  study demonstrated voided volume of 147 and maximal flow rate of 10ml/s.     Assessment/Plan   Diagnoses and all orders for this visit:  Benign prostatic hyperplasia with nocturia  -     Urine flow measurement    BPH with LUTs    Patient is overall satisfied with his urinary outcome.     We will refill Alfuzosin for 1 year.    Follow up in 1 year.     All questions were answered to the patient's " satisfaction. Patient agrees with the plan and wishes to proceed. Follow-up will be scheduled appropriately.     Scribed for Dr. Singh by Nikki Wood. I , Dr Singh, have personally reviewed and agreed with the information entered by the Virtual Scribe.

## 2024-01-18 ENCOUNTER — COMMITTEE REVIEW (OUTPATIENT)
Dept: TRANSPLANT | Facility: HOSPITAL | Age: 74
End: 2024-01-18
Payer: MEDICARE

## 2024-01-22 DIAGNOSIS — I15.9 SECONDARY HYPERTENSION: ICD-10-CM

## 2024-01-22 DIAGNOSIS — E55.9 VITAMIN D DEFICIENCY: ICD-10-CM

## 2024-01-22 RX ORDER — CHOLECALCIFEROL (VITAMIN D3) 50 MCG
50 TABLET ORAL DAILY
Qty: 12 TABLET | Refills: 2 | OUTPATIENT
Start: 2024-01-22

## 2024-01-22 RX ORDER — LABETALOL 100 MG/1
150 TABLET, FILM COATED ORAL 2 TIMES DAILY
Qty: 90 TABLET | Refills: 1 | OUTPATIENT
Start: 2024-01-22

## 2024-01-25 DIAGNOSIS — E55.9 VITAMIN D DEFICIENCY: Primary | ICD-10-CM

## 2024-01-30 ENCOUNTER — APPOINTMENT (OUTPATIENT)
Dept: OPHTHALMOLOGY | Facility: CLINIC | Age: 74
End: 2024-01-30
Payer: MEDICARE

## 2024-01-30 LAB
HLA CLASS I AB SCREEN,FC: NORMAL
HLA CLASS II AB SCREEN,FC: NORMAL
HLA RESULTS: NORMAL

## 2024-01-30 RX ORDER — NICOTINE 11MG/24HR
PATCH, TRANSDERMAL 24 HOURS TRANSDERMAL DAILY
Qty: 90 CAPSULE | Refills: 3 | Status: SHIPPED | OUTPATIENT
Start: 2024-01-30

## 2024-02-01 PROCEDURE — 86808 CYTOTOXIC ANTIBODY SCREENING: CPT | Mod: OUT | Performed by: SURGERY

## 2024-02-07 DIAGNOSIS — E03.9 HYPOTHYROIDISM, UNSPECIFIED TYPE: ICD-10-CM

## 2024-02-07 RX ORDER — LEVOTHYROXINE SODIUM 150 UG/1
150 TABLET ORAL
Qty: 90 TABLET | Refills: 0 | Status: SHIPPED | OUTPATIENT
Start: 2024-02-07

## 2024-02-07 NOTE — COMMITTEE REVIEW
Patient Discussion Note     Organ being evaluated for: Kidney    Additional Discussion Notes and Findings: Discussed at selection committee.  Patient to remain status 7 due to high PSA levels.  Coordinator to reach out to patient urologist to discuss plan.

## 2024-02-15 DIAGNOSIS — M10.9 GOUT DUE TO HYPERURICEMIA ASSOCIATED WITH MUTATION IN HPRT1 GENE: ICD-10-CM

## 2024-02-15 RX ORDER — FEBUXOSTAT 40 MG/1
40 TABLET, FILM COATED ORAL DAILY
Qty: 90 TABLET | Refills: 3 | Status: SHIPPED | OUTPATIENT
Start: 2024-02-15 | End: 2025-02-14

## 2024-02-16 ENCOUNTER — LAB REQUISITION (OUTPATIENT)
Dept: LAB | Facility: CLINIC | Age: 74
End: 2024-02-16
Payer: MEDICARE

## 2024-02-16 DIAGNOSIS — N18.6 END STAGE RENAL DISEASE (MULTI): ICD-10-CM

## 2024-02-16 LAB — FREEZE CROSSMATCH: NORMAL

## 2024-02-29 ENCOUNTER — LAB REQUISITION (OUTPATIENT)
Dept: LAB | Facility: CLINIC | Age: 74
End: 2024-02-29
Payer: MEDICARE

## 2024-02-29 DIAGNOSIS — N18.6 END STAGE RENAL DISEASE (MULTI): ICD-10-CM

## 2024-02-29 LAB — FREEZE CROSSMATCH: NORMAL

## 2024-02-29 PROCEDURE — 86808 CYTOTOXIC ANTIBODY SCREENING: CPT | Mod: OUT | Performed by: SURGERY

## 2024-06-04 DIAGNOSIS — M10.9 GOUT DUE TO HYPERURICEMIA ASSOCIATED WITH MUTATION IN HPRT1 GENE: ICD-10-CM

## 2024-06-20 ENCOUNTER — APPOINTMENT (OUTPATIENT)
Dept: PRIMARY CARE | Facility: CLINIC | Age: 74
End: 2024-06-20
Payer: MEDICARE

## 2024-07-08 ENCOUNTER — APPOINTMENT (OUTPATIENT)
Dept: PRIMARY CARE | Facility: CLINIC | Age: 74
End: 2024-07-08
Payer: MEDICARE

## 2024-07-08 VITALS
HEIGHT: 65 IN | SYSTOLIC BLOOD PRESSURE: 142 MMHG | BODY MASS INDEX: 26.33 KG/M2 | WEIGHT: 158 LBS | HEART RATE: 66 BPM | OXYGEN SATURATION: 97 % | DIASTOLIC BLOOD PRESSURE: 68 MMHG

## 2024-07-08 DIAGNOSIS — N25.81 SECONDARY HYPERPARATHYROIDISM OF RENAL ORIGIN (MULTI): ICD-10-CM

## 2024-07-08 DIAGNOSIS — Z99.2 END-STAGE RENAL DISEASE ON HEMODIALYSIS (MULTI): ICD-10-CM

## 2024-07-08 DIAGNOSIS — I77.0 AVF (ARTERIOVENOUS FISTULA) (CMS-HCC): ICD-10-CM

## 2024-07-08 DIAGNOSIS — N18.6 END-STAGE RENAL DISEASE ON HEMODIALYSIS (MULTI): ICD-10-CM

## 2024-07-08 DIAGNOSIS — E78.5 HYPERLIPIDEMIA, UNSPECIFIED HYPERLIPIDEMIA TYPE: ICD-10-CM

## 2024-07-08 DIAGNOSIS — E55.9 VITAMIN D DEFICIENCY: ICD-10-CM

## 2024-07-08 DIAGNOSIS — I10 HYPERTENSION, UNSPECIFIED TYPE: Primary | ICD-10-CM

## 2024-07-08 DIAGNOSIS — Z00.00 MEDICARE ANNUAL WELLNESS VISIT, SUBSEQUENT: ICD-10-CM

## 2024-07-08 DIAGNOSIS — E03.9 HYPOTHYROIDISM, UNSPECIFIED TYPE: ICD-10-CM

## 2024-07-08 DIAGNOSIS — J84.10 CALCIFIED GRANULOMA OF LUNG (MULTI): ICD-10-CM

## 2024-07-08 DIAGNOSIS — Z00.00 WELLNESS EXAMINATION: ICD-10-CM

## 2024-07-08 PROBLEM — E11.9 TYPE 2 DIABETES MELLITUS WITHOUT COMPLICATION (MULTI): Status: RESOLVED | Noted: 2023-10-04 | Resolved: 2024-07-08

## 2024-07-08 PROCEDURE — 1160F RVW MEDS BY RX/DR IN RCRD: CPT | Performed by: STUDENT IN AN ORGANIZED HEALTH CARE EDUCATION/TRAINING PROGRAM

## 2024-07-08 PROCEDURE — 3077F SYST BP >= 140 MM HG: CPT | Performed by: STUDENT IN AN ORGANIZED HEALTH CARE EDUCATION/TRAINING PROGRAM

## 2024-07-08 PROCEDURE — G0439 PPPS, SUBSEQ VISIT: HCPCS | Performed by: STUDENT IN AN ORGANIZED HEALTH CARE EDUCATION/TRAINING PROGRAM

## 2024-07-08 PROCEDURE — 3078F DIAST BP <80 MM HG: CPT | Performed by: STUDENT IN AN ORGANIZED HEALTH CARE EDUCATION/TRAINING PROGRAM

## 2024-07-08 PROCEDURE — 99397 PER PM REEVAL EST PAT 65+ YR: CPT | Performed by: STUDENT IN AN ORGANIZED HEALTH CARE EDUCATION/TRAINING PROGRAM

## 2024-07-08 PROCEDURE — 99214 OFFICE O/P EST MOD 30 MIN: CPT | Performed by: STUDENT IN AN ORGANIZED HEALTH CARE EDUCATION/TRAINING PROGRAM

## 2024-07-08 PROCEDURE — 1170F FXNL STATUS ASSESSED: CPT | Performed by: STUDENT IN AN ORGANIZED HEALTH CARE EDUCATION/TRAINING PROGRAM

## 2024-07-08 PROCEDURE — 1036F TOBACCO NON-USER: CPT | Performed by: STUDENT IN AN ORGANIZED HEALTH CARE EDUCATION/TRAINING PROGRAM

## 2024-07-08 PROCEDURE — 1159F MED LIST DOCD IN RCRD: CPT | Performed by: STUDENT IN AN ORGANIZED HEALTH CARE EDUCATION/TRAINING PROGRAM

## 2024-07-08 RX ORDER — LEVOTHYROXINE SODIUM 150 UG/1
150 TABLET ORAL
Qty: 90 TABLET | Refills: 0 | Status: SHIPPED | OUTPATIENT
Start: 2024-07-08

## 2024-07-08 ASSESSMENT — ENCOUNTER SYMPTOMS
LOSS OF SENSATION IN FEET: 0
DEPRESSION: 0
OCCASIONAL FEELINGS OF UNSTEADINESS: 0

## 2024-07-08 ASSESSMENT — ACTIVITIES OF DAILY LIVING (ADL)
MANAGING_FINANCES: INDEPENDENT
GROCERY_SHOPPING: INDEPENDENT
BATHING: INDEPENDENT
DRESSING: INDEPENDENT
TAKING_MEDICATION: INDEPENDENT
DOING_HOUSEWORK: INDEPENDENT

## 2024-07-08 ASSESSMENT — PATIENT HEALTH QUESTIONNAIRE - PHQ9
SUM OF ALL RESPONSES TO PHQ9 QUESTIONS 1 AND 2: 0
2. FEELING DOWN, DEPRESSED OR HOPELESS: NOT AT ALL
1. LITTLE INTEREST OR PLEASURE IN DOING THINGS: NOT AT ALL

## 2024-07-08 NOTE — PROGRESS NOTES
"Subjective   Reason for Visit: Salazar Jesus is an 74 y.o. male here for a Medicare Wellness visit.               HPI    Patient Care Team:  Christopher D'Amico, DO as PCP - General     Review of Systems    Objective   Vitals:  Ht 1.651 m (5' 5\")   BMI 24.96 kg/m²       Physical Exam    Assessment/Plan   Problem List Items Addressed This Visit    None           74-year-old male presenting for follow-up on multiple chronic conditions, Medicare annual wellness exam/CPE.     HTN  Stable on current medications. Following with nephro.     HLD  Stable, tolerating regimen well     Hypothyroidism  Stable, tolerates current regimen well.  No recent labs.     Gout  Stable on current regimen.     ESRD  Following with nephrology, on dialysis.     12 point ROS reviewed and negative other than as stated in HPI     General: Alert, oriented, pleasant, in no acute distress  HEENT:   Head: normocephalic, atraumatic;    eyes: EOMI, no scleral icterus;   CV: Heart with regular rate and rhythm, normal S1/S2, no murmurs  Lungs: CTAB without wheezing, rhonchi or rales; good respiratory effort, no increased work of breathing  Neuro: Cranial nerves grossly intact; alert and oriented  Psych: Appropriate mood and affect    #HM  -Routine labs  -Vaccines:      Flu: Out of season     Shingrix: Recommended, advised to go to local pharmacy     Pneumococcal: UTD     Tdap: Recommended, advised to go to local pharmacy  -Colonoscopy: Colonoscopy 2023, 3-year plan  -AAA screening: Negative in 2022    #HTN  - Slightly above goal in office, generally at goal  -Continue labetalol 100 mg 1.5 tabs, amlodipine 5 mg daily, valsartan 160 mg daily, eplerenone 25 mg daily  -Continue to follow nephrology for any changes     # HLD  -Continue rosuvastatin 40 mg daily  -Repeat lipid panel    #Hypothyroidism  -Continue levothyroxine 150 mcg  -Repeat TSH    # Gout  -Continue Uloric 40 mg daily  -Having insurance issues, sent to [facility] pharmacist to sort out     # " ESRD #secondary hyperparathyroidism  -Continue to follow with nephrology    #BPH  -On alfuzosin, following with urology    F/U 4-6 months, sooner if indicated     Chris D'Amico, DO

## 2024-07-09 ENCOUNTER — TELEPHONE (OUTPATIENT)
Dept: PHARMACY | Facility: HOSPITAL | Age: 74
End: 2024-07-09
Payer: MEDICARE

## 2024-07-09 NOTE — TELEPHONE ENCOUNTER
Left voicemail to inform patient of the outcome for the Prior Authorization Request:  Medication: febuxostat 40 mg once daily   Quantity: #90 for 90 days  PA response: Approved    Additional information:  - test fill shows paid claim with $30 copay    Signed,   Bety Unger

## 2024-07-10 ENCOUNTER — LAB (OUTPATIENT)
Dept: LAB | Facility: LAB | Age: 74
End: 2024-07-10
Payer: MEDICARE

## 2024-07-10 DIAGNOSIS — E55.9 VITAMIN D DEFICIENCY: ICD-10-CM

## 2024-07-10 DIAGNOSIS — N25.81 SECONDARY HYPERPARATHYROIDISM OF RENAL ORIGIN (MULTI): ICD-10-CM

## 2024-07-10 DIAGNOSIS — I10 HYPERTENSION, UNSPECIFIED TYPE: ICD-10-CM

## 2024-07-10 DIAGNOSIS — N18.6 END-STAGE RENAL DISEASE ON HEMODIALYSIS (MULTI): ICD-10-CM

## 2024-07-10 DIAGNOSIS — E78.5 HYPERLIPIDEMIA, UNSPECIFIED HYPERLIPIDEMIA TYPE: ICD-10-CM

## 2024-07-10 DIAGNOSIS — Z99.2 END-STAGE RENAL DISEASE ON HEMODIALYSIS (MULTI): ICD-10-CM

## 2024-07-10 DIAGNOSIS — I77.0 AVF (ARTERIOVENOUS FISTULA) (CMS-HCC): ICD-10-CM

## 2024-07-10 DIAGNOSIS — Z00.00 MEDICARE ANNUAL WELLNESS VISIT, SUBSEQUENT: ICD-10-CM

## 2024-07-10 DIAGNOSIS — J84.10 CALCIFIED GRANULOMA OF LUNG (MULTI): ICD-10-CM

## 2024-07-10 DIAGNOSIS — Z00.00 WELLNESS EXAMINATION: ICD-10-CM

## 2024-07-10 DIAGNOSIS — E03.9 HYPOTHYROIDISM, UNSPECIFIED TYPE: ICD-10-CM

## 2024-07-10 LAB
25(OH)D3 SERPL-MCNC: 51 NG/ML (ref 30–100)
CHOLEST SERPL-MCNC: 153 MG/DL (ref 0–199)
CHOLESTEROL/HDL RATIO: 3.9
ERYTHROCYTE [DISTWIDTH] IN BLOOD BY AUTOMATED COUNT: 14.6 % (ref 11.5–14.5)
HCT VFR BLD AUTO: 30.5 % (ref 41–52)
HDLC SERPL-MCNC: 39.2 MG/DL
HGB BLD-MCNC: 10.1 G/DL (ref 13.5–17.5)
LDLC SERPL CALC-MCNC: 77 MG/DL
MCH RBC QN AUTO: 34.4 PG (ref 26–34)
MCHC RBC AUTO-ENTMCNC: 33.1 G/DL (ref 32–36)
MCV RBC AUTO: 104 FL (ref 80–100)
NON HDL CHOLESTEROL: 114 MG/DL (ref 0–149)
NRBC BLD-RTO: 0 /100 WBCS (ref 0–0)
PLATELET # BLD AUTO: 132 X10*3/UL (ref 150–450)
RBC # BLD AUTO: 2.94 X10*6/UL (ref 4.5–5.9)
T4 FREE SERPL-MCNC: 1.19 NG/DL (ref 0.78–1.48)
TRIGL SERPL-MCNC: 184 MG/DL (ref 0–149)
TSH SERPL-ACNC: 6.77 MIU/L (ref 0.44–3.98)
VLDL: 37 MG/DL (ref 0–40)
WBC # BLD AUTO: 5.3 X10*3/UL (ref 4.4–11.3)

## 2024-07-10 PROCEDURE — 36415 COLL VENOUS BLD VENIPUNCTURE: CPT

## 2024-07-10 PROCEDURE — 80061 LIPID PANEL: CPT

## 2024-07-10 PROCEDURE — 85027 COMPLETE CBC AUTOMATED: CPT

## 2024-07-10 PROCEDURE — 82306 VITAMIN D 25 HYDROXY: CPT

## 2024-07-10 PROCEDURE — 84439 ASSAY OF FREE THYROXINE: CPT

## 2024-07-10 PROCEDURE — 84443 ASSAY THYROID STIM HORMONE: CPT

## 2024-07-10 NOTE — LETTER
July 11, 2024     Salazar Jesus  [address] [place] [address]      Dear Mr. Jesus:    Below are the results from your recent visit:    Triglycerides at 184, borderline elevated, continue to improve diet.     TSH mildly elevated at 6.77 with normal T4, which does indicate some undertreatment.  Has been good on previous labs over the past year.If you have been stable on the current regimen, I would recommend just repeating labs at your next visit, if TSH still elevated, would consider slight increase in medication.     Moderate anemia, stable, likely secondary to renal failure.     Remaining labs unremarkable.           If you have any questions or concerns, please don't hesitate to call.

## 2024-07-10 NOTE — RESULT ENCOUNTER NOTE
Triglycerides at 184, borderline elevated, continue to improve diet.    TSH mildly elevated at 6.77 with normal T4, which does indicate some undertreatment.  Has been good on previous labs over the past year.  If he has been stable on the current regimen, I would recommend just repeating labs at next visit, if TSH still elevated, would consider slight increase in medication.    Moderate anemia, stable, likely secondary to renal failure.    Remaining labs unremarkable.

## 2024-07-22 ENCOUNTER — DOCUMENTATION (OUTPATIENT)
Dept: TRANSPLANT | Facility: HOSPITAL | Age: 74
End: 2024-07-22
Payer: MEDICARE

## 2024-07-22 DIAGNOSIS — Z01.818 PRE-TRANSPLANT EVALUATION FOR KIDNEY TRANSPLANT: ICD-10-CM

## 2024-07-22 DIAGNOSIS — Z51.81 ENCOUNTER FOR THERAPEUTIC DRUG LEVEL MONITORING: ICD-10-CM

## 2024-07-22 DIAGNOSIS — N18.6 END STAGE RENAL DISEASE (MULTI): ICD-10-CM

## 2024-07-22 DIAGNOSIS — Z79.899 OTHER LONG TERM (CURRENT) DRUG THERAPY: ICD-10-CM

## 2024-07-22 DIAGNOSIS — Z13.6 ENCOUNTER FOR SCREENING FOR CARDIOVASCULAR DISORDERS: ICD-10-CM

## 2024-07-22 DIAGNOSIS — Z12.5 ENCOUNTER FOR SCREENING FOR MALIGNANT NEOPLASM OF PROSTATE: ICD-10-CM

## 2024-07-23 ENCOUNTER — TELEPHONE (OUTPATIENT)
Dept: TRANSPLANT | Facility: HOSPITAL | Age: 74
End: 2024-07-23
Payer: MEDICARE

## 2024-07-23 ENCOUNTER — DOCUMENTATION (OUTPATIENT)
Dept: TRANSPLANT | Facility: HOSPITAL | Age: 74
End: 2024-07-23
Payer: MEDICARE

## 2024-07-23 NOTE — TELEPHONE ENCOUNTER
Returned patient's call.    Explained that he was listed on a hold status.    I let him know I would reach out to Dr. Singh, his urologist, for final clearance.    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    I had more boxes sent to him at home as well.    He will call me next week if we have not touched base before then.    Also, when we are able to get him activated, he let me know he is going to [place] at the end of August but does not want to be put back on hold, he would come back if he got an offer.

## 2024-07-23 NOTE — PROGRESS NOTES
Per Dr. Singh, pt will need to get a prostate MRI for final urologic clearance for kidney transplant.    She will have a virtual visit for him to discuss.

## 2024-07-23 NOTE — PROGRESS NOTES
Message sent to Dr. Singh in regards to pt's PSA of 4.40, awaiting her response in terms of urological clearance.

## 2024-08-05 DIAGNOSIS — I10 HYPERTENSION, UNSPECIFIED TYPE: ICD-10-CM

## 2024-08-05 RX ORDER — AMLODIPINE BESYLATE 10 MG/1
10 TABLET ORAL DAILY
Qty: 90 TABLET | Refills: 1 | Status: SHIPPED | OUTPATIENT
Start: 2024-08-05 | End: 2025-02-01

## 2024-08-20 ENCOUNTER — OFFICE VISIT (OUTPATIENT)
Dept: TRANSPLANT | Facility: HOSPITAL | Age: 74
End: 2024-08-20
Payer: MEDICARE

## 2024-08-20 ENCOUNTER — SOCIAL WORK (OUTPATIENT)
Dept: TRANSPLANT | Facility: HOSPITAL | Age: 74
End: 2024-08-20
Payer: MEDICARE

## 2024-08-20 ENCOUNTER — HOSPITAL ENCOUNTER (OUTPATIENT)
Dept: CARDIOLOGY | Facility: HOSPITAL | Age: 74
Discharge: HOME | End: 2024-08-20
Payer: MEDICARE

## 2024-08-20 ENCOUNTER — HOSPITAL ENCOUNTER (OUTPATIENT)
Dept: RADIOLOGY | Facility: HOSPITAL | Age: 74
Discharge: HOME | End: 2024-08-20
Payer: MEDICARE

## 2024-08-20 ENCOUNTER — DOCUMENTATION (OUTPATIENT)
Dept: TRANSPLANT | Facility: HOSPITAL | Age: 74
End: 2024-08-20

## 2024-08-20 VITALS
OXYGEN SATURATION: 96 % | WEIGHT: 161.4 LBS | BODY MASS INDEX: 26.86 KG/M2 | OXYGEN SATURATION: 96 % | TEMPERATURE: 97 F | HEART RATE: 63 BPM | DIASTOLIC BLOOD PRESSURE: 77 MMHG | WEIGHT: 161.4 LBS | SYSTOLIC BLOOD PRESSURE: 146 MMHG | SYSTOLIC BLOOD PRESSURE: 146 MMHG | HEART RATE: 63 BPM | TEMPERATURE: 97 F | DIASTOLIC BLOOD PRESSURE: 77 MMHG | BODY MASS INDEX: 26.86 KG/M2

## 2024-08-20 DIAGNOSIS — Z01.818 PRE-TRANSPLANT EVALUATION FOR KIDNEY TRANSPLANT: ICD-10-CM

## 2024-08-20 DIAGNOSIS — Z01.818 PRE-TRANSPLANT EVALUATION FOR KIDNEY TRANSPLANT: Primary | ICD-10-CM

## 2024-08-20 DIAGNOSIS — Z99.2 ESRD (END STAGE RENAL DISEASE) ON DIALYSIS (MULTI): Primary | ICD-10-CM

## 2024-08-20 DIAGNOSIS — N18.6 ESRD (END STAGE RENAL DISEASE) ON DIALYSIS (MULTI): Primary | ICD-10-CM

## 2024-08-20 PROCEDURE — 99214 OFFICE O/P EST MOD 30 MIN: CPT | Performed by: STUDENT IN AN ORGANIZED HEALTH CARE EDUCATION/TRAINING PROGRAM

## 2024-08-20 PROCEDURE — 71046 X-RAY EXAM CHEST 2 VIEWS: CPT | Performed by: RADIOLOGY

## 2024-08-20 PROCEDURE — 3078F DIAST BP <80 MM HG: CPT | Performed by: STUDENT IN AN ORGANIZED HEALTH CARE EDUCATION/TRAINING PROGRAM

## 2024-08-20 PROCEDURE — 71046 X-RAY EXAM CHEST 2 VIEWS: CPT

## 2024-08-20 PROCEDURE — 2500000004 HC RX 250 GENERAL PHARMACY W/ HCPCS (ALT 636 FOR OP/ED): Performed by: STUDENT IN AN ORGANIZED HEALTH CARE EDUCATION/TRAINING PROGRAM

## 2024-08-20 PROCEDURE — 1126F AMNT PAIN NOTED NONE PRSNT: CPT | Performed by: STUDENT IN AN ORGANIZED HEALTH CARE EDUCATION/TRAINING PROGRAM

## 2024-08-20 PROCEDURE — 3077F SYST BP >= 140 MM HG: CPT | Performed by: STUDENT IN AN ORGANIZED HEALTH CARE EDUCATION/TRAINING PROGRAM

## 2024-08-20 PROCEDURE — 93306 TTE W/DOPPLER COMPLETE: CPT | Performed by: INTERNAL MEDICINE

## 2024-08-20 PROCEDURE — 93306 TTE W/DOPPLER COMPLETE: CPT

## 2024-08-20 ASSESSMENT — PAIN SCALES - GENERAL
PAINLEVEL: 0-NO PAIN
PAINLEVEL: 0-NO PAIN

## 2024-08-20 NOTE — PROGRESS NOTES
Patient attended appointment on 08/20/2024 with Dr. العراقي.  Wife present. Medications and allergies reviewed with the patient. Patient ambulated. Patient is tolerating dialysis well. Health screenings reviewed.   Listing consent reviewed.  Recent hospitalizations:  No.  Blood transfusions:  No.  Falls:  No.  Pt stated he will ask at dialysis about getting the hep b vaccine; I asked him to call me so I could document it.    Comments:  Dr. العراقي requested a liver ultrasound due to hx of drinking.  Pt to get a prostate MRI per Dr. Singh, urology, for final clearance.  Orders entered and tasked out.    Demographic updates:   Neph is Dr. Sears, PCP is Dr. D'Amico.    Emergency contacts updated.

## 2024-08-20 NOTE — PROGRESS NOTES
Kidney Transplant Evaluation Office Visit    Chief Complaint: Patient presents for kidney transplant evaluation    History of Present Illness:  Salazar Jesus  is a 74 y.o. male presents with ESRD from Hypertension. He initiated HD in 4/2023 and currently tolerating it via a LUE AVF without any need for midodrine. He is oliguric. He has BPH and followed by Urology.    Additional medical history includes Hypertension, Hyperlipidemia, Hypothyroidism, gout, BPH on Cardura, and sleep apnea on nightly CPAP.    In clinic today, he denies any chest pain, SOB, palpitations, as well as any recent fever, abdominal pain, difficulty voiding or other urinary symptoms, constipation, or poor oral intake.     He is a retired sculptor and still active, able to walk 2 blocks and a flight of stairs without major limitations. His wife is primary support and is here with him. 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    He was listed on [facility] in 10/2021 but was made inactive last year to complete BPH work-up and a colonoscopy as well as pulmonary clearance at the time. He returns today for his yearly visit and reports no major health events since last visit in 8/2023. In the meantime he was seen and deemed low risk by pulmonology. And also seen by Urology for BPH and awaiting MRI prostate for final clearance. Colonoscopy is being scheduled.    He drinks 2-3 beers a day for last many years.    Hemodialysis: 3 times a week  Dialysis Access: LUE AVF  Urine Status: oliguric.   Disease Etiology: hypertensive nephropathy.   Disease Complications: dyslipidemia and hypertension.   PVD: YES - CT from 7/2023  Prior Abdominal Surgery: NO   Prior Malignancy: NO   BMI: Body mass index is 26.86 kg/m².  Potential Living Donors: NO     Review of Systems:  Cardiac: Denies chest pain, palpitations  : Normal urine output. Denies history of gross hematuria, nephrolithiasis, urinary retention, or recurrent UTIs.  Vascular: Denies personal or familial  history of DVT/PE. No active claudication or non-healing LE wounds.  Extremities: LE Edema -   Functional Status: Can walk up 2 flights of stairs    Past Medical History:  ESRD on HD  Hypertension  Hyperlipidemia  BPH  Gout  ALLISON on CPAP    Past Surgical History:  No major abdominal operations    Social History:  He is a retired sculptor and still active, able to walk 2 blocks and a flight of stairs without major limitations. His wife is primary support and is here with him. 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  Drinks 2-3 beers a day  No smoking or drug use    Transfusions: None  Pregnancy: Not applicable  Prior transplant: No    Family History:  Mother: n/a  Father: n/a  Sibling: n/a    Physical Exam:  Vitals:    24 1048   BP: 146/77   Pulse: 63   Temp: 36.1 °C (97 °F)   SpO2: 96%     Gen: A+OX3; NAD  HEENT: PERRL, sclera anicteric, MMM  Cardiac: RRR  Chest: Normal inspiratory effort  Abdomen: S/NT/ND.  Ext: No LE edema  Vascular: 2+ palpable femoral pulses  Psychiatric: Normal mood, affect    Assessment/Plan:  - The patient is a potential candidate for kidney transplantation pending updated work-up  - Routine age/gender based screening  - Cardiac testing per protocol: ECHO/stress test/ Cardiology  - Non-contrast CT scan abdomen/pelvis - reviewed from 2023  - MRI prostate, Colonoscopy pending    - Liver US     Surgical Considerations:  Review cardiopulmonary work-up    Transplant Education:    I had a discussion with this patient regarding 1 year graft and patient survival statistics following renal transplantation for both living and  donor allograft recipients. This data included [facility] data compared to National data readily available for review on https://www.SRTR.org. The patient also had attended the kidney transplant education class provided by the transplant institute.     The difference between allograft function was discussed comparing living donor, KDPI 0-85%, and >85% kidneys.     Further  discussion included:  -The transplant selection committee process.  -The need for lifelong immunosuppressive therapy, and the side effects of these medications including the risk of infections, cancer, and lymphoma.  -The wait list time approximately is 5 years or more for  donor transplants and the statistical superiority of a living donor.     -Using identified donors with risk criteria for transmission of infection  -The possibility of utilizing  donors with known HCV antibody and/or MERVIN positivity and post-transplant treatment/surveillance protocol  -Potential transmission of infectious disease from any  donors, as well as living donors.   -The possibility of transmission of tumors and infections via the transplanted organ.  -The inability to completely test for all potential harmful tumors or infectious agents.  -The possibility of listing at multiple locations.     Surgical complications including need for reoperation(s) including but not limited to:  -Bleeding.  -Repair of leaks.  -Control of infection.  -Blood clots in the transplant vessels.  -Possible kidney transplant removal.     The medical complications including but not limited to:  -Death.  -Cardiac.  -Pulmonary.  -Infectious.  -Neurologic.  -Other Complications.     We also discussed how the kidney transplant could function:  -Non-function and possible kidney transplant removal within the first 3 to 6 months.  -Delayed graft function (dialysis needed after transplant).  -The potential of recurrence of kidney disease leading to kidney transplant graft loss.    Time Attestation:  I spent 60 minutes with the patient, over 50 minutes in counseling and education as outlined above.    Tita العراقي MD, [facility]  Transplant & Hepatobiliary Surgery

## 2024-08-21 ENCOUNTER — DOCUMENTATION (OUTPATIENT)
Dept: TRANSPLANT | Facility: HOSPITAL | Age: 74
End: 2024-08-21

## 2024-08-21 ENCOUNTER — LAB (OUTPATIENT)
Dept: LAB | Facility: LAB | Age: 74
End: 2024-08-21
Payer: MEDICARE

## 2024-08-21 ENCOUNTER — COMMITTEE REVIEW (OUTPATIENT)
Dept: TRANSPLANT | Facility: HOSPITAL | Age: 74
End: 2024-08-21

## 2024-08-21 DIAGNOSIS — N18.6 END STAGE RENAL DISEASE (MULTI): ICD-10-CM

## 2024-08-21 DIAGNOSIS — Z12.5 ENCOUNTER FOR SCREENING FOR MALIGNANT NEOPLASM OF PROSTATE: ICD-10-CM

## 2024-08-21 DIAGNOSIS — Z79.899 OTHER LONG TERM (CURRENT) DRUG THERAPY: ICD-10-CM

## 2024-08-21 DIAGNOSIS — Z01.818 PRE-TRANSPLANT EVALUATION FOR KIDNEY TRANSPLANT: ICD-10-CM

## 2024-08-21 DIAGNOSIS — Z13.6 ENCOUNTER FOR SCREENING FOR CARDIOVASCULAR DISORDERS: ICD-10-CM

## 2024-08-21 DIAGNOSIS — Z51.81 ENCOUNTER FOR THERAPEUTIC DRUG LEVEL MONITORING: ICD-10-CM

## 2024-08-21 LAB
ALBUMIN SERPL BCP-MCNC: 4.3 G/DL (ref 3.4–5)
ALP SERPL-CCNC: 68 U/L (ref 33–136)
ALT SERPL W P-5'-P-CCNC: 17 U/L (ref 10–52)
AMYLASE SERPL-CCNC: 56 U/L (ref 29–103)
AORTIC VALVE PEAK VELOCITY: 1.37 M/S
AST SERPL W P-5'-P-CCNC: 17 U/L (ref 9–39)
AV PEAK GRADIENT: 7.5 MMHG
AVA (PEAK VEL): 2.05 CM2
BILIRUB DIRECT SERPL-MCNC: 0.2 MG/DL (ref 0–0.3)
BILIRUB SERPL-MCNC: 0.6 MG/DL (ref 0–1.2)
BUN SERPL-MCNC: 33 MG/DL (ref 6–23)
C PEPTIDE SERPL-MCNC: 13 NG/ML (ref 0.7–3.9)
CHOLEST SERPL-MCNC: 159 MG/DL (ref 0–199)
CHOLESTEROL/HDL RATIO: 3.7
CMV IGG AVIDITY SERPL IA-RTO: NONREACTIVE %
CREAT SERPL-MCNC: 6.12 MG/DL (ref 0.5–1.3)
EBV EA IGG SER QL: NEGATIVE
EBV NA AB SER QL: POSITIVE
EBV VCA IGG SER IA-ACNC: POSITIVE
EBV VCA IGM SER IA-ACNC: NEGATIVE
EGFRCR SERPLBLD CKD-EPI 2021: 9 ML/MIN/1.73M*2
EJECTION FRACTION APICAL 4 CHAMBER: 54.1
EJECTION FRACTION: 60 %
ERYTHROCYTE [DISTWIDTH] IN BLOOD BY AUTOMATED COUNT: 13.7 % (ref 11.5–14.5)
EST. AVERAGE GLUCOSE BLD GHB EST-MCNC: 80 MG/DL
HBA1C MFR BLD: 4.4 %
HBV CORE AB SER QL: NONREACTIVE
HBV SURFACE AB SER-ACNC: 78 MIU/ML
HBV SURFACE AG SERPL QL IA: NONREACTIVE
HCT VFR BLD AUTO: 34.8 % (ref 41–52)
HCV AB SER QL: NONREACTIVE
HCYS SERPL-SCNC: 21.45 UMOL/L (ref 5–13.9)
HDLC SERPL-MCNC: 42.9 MG/DL
HGB BLD-MCNC: 11.5 G/DL (ref 13.5–17.5)
HIV 1+2 AB+HIV1 P24 AG SERPL QL IA: NONREACTIVE
INR PPP: 1 (ref 0.9–1.1)
LEFT ATRIUM VOLUME AREA LENGTH INDEX BSA: 27.7 ML/M2
LEFT VENTRICLE INTERNAL DIMENSION DIASTOLE: 5.9 CM (ref 3.5–6)
LEFT VENTRICULAR OUTFLOW TRACT DIAMETER: 1.9 CM
MCH RBC QN AUTO: 34 PG (ref 26–34)
MCHC RBC AUTO-ENTMCNC: 33 G/DL (ref 32–36)
MCV RBC AUTO: 103 FL (ref 80–100)
MITRAL VALVE E/A RATIO: 0.72
NON-HDL CHOLESTEROL: 116 MG/DL (ref 0–149)
NRBC BLD-RTO: 0 /100 WBCS (ref 0–0)
PHOSPHATE SERPL-MCNC: 5.5 MG/DL (ref 2.5–4.9)
PLATELET # BLD AUTO: 133 X10*3/UL (ref 150–450)
PROT SERPL-MCNC: 6.6 G/DL (ref 6.4–8.2)
PROTHROMBIN TIME: 10.9 SECONDS (ref 9.8–12.8)
RBC # BLD AUTO: 3.38 X10*6/UL (ref 4.5–5.9)
RIGHT VENTRICLE FREE WALL PEAK S': 13.6 CM/S
RIGHT VENTRICLE PEAK SYSTOLIC PRESSURE: 16.8 MMHG
TREPONEMA PALLIDUM IGG+IGM AB [PRESENCE] IN SERUM OR PLASMA BY IMMUNOASSAY: NONREACTIVE
TRICUSPID ANNULAR PLANE SYSTOLIC EXCURSION: 2.6 CM
VARICELLA ZOSTER IGG INDEX: 5.5 IA
VZV IGG SER QL IA: POSITIVE
WBC # BLD AUTO: 5 X10*3/UL (ref 4.4–11.3)

## 2024-08-21 PROCEDURE — 85610 PROTHROMBIN TIME: CPT

## 2024-08-21 PROCEDURE — 87389 HIV-1 AG W/HIV-1&-2 AB AG IA: CPT

## 2024-08-21 PROCEDURE — 86663 EPSTEIN-BARR ANTIBODY: CPT

## 2024-08-21 PROCEDURE — 82465 ASSAY BLD/SERUM CHOLESTEROL: CPT

## 2024-08-21 PROCEDURE — 85027 COMPLETE CBC AUTOMATED: CPT

## 2024-08-21 PROCEDURE — 86787 VARICELLA-ZOSTER ANTIBODY: CPT

## 2024-08-21 PROCEDURE — 84154 ASSAY OF PSA FREE: CPT

## 2024-08-21 PROCEDURE — 86803 HEPATITIS C AB TEST: CPT

## 2024-08-21 PROCEDURE — 84100 ASSAY OF PHOSPHORUS: CPT

## 2024-08-21 PROCEDURE — 82565 ASSAY OF CREATININE: CPT

## 2024-08-21 PROCEDURE — 86825 HLA X-MATH NON-CYTOTOXIC: CPT | Mod: OUT | Performed by: SURGERY

## 2024-08-21 PROCEDURE — 80323 ALKALOIDS NOS: CPT

## 2024-08-21 PROCEDURE — 83036 HEMOGLOBIN GLYCOSYLATED A1C: CPT

## 2024-08-21 PROCEDURE — 87340 HEPATITIS B SURFACE AG IA: CPT

## 2024-08-21 PROCEDURE — 86644 CMV ANTIBODY: CPT

## 2024-08-21 PROCEDURE — 80076 HEPATIC FUNCTION PANEL: CPT

## 2024-08-21 PROCEDURE — 84681 ASSAY OF C-PEPTIDE: CPT

## 2024-08-21 PROCEDURE — 84520 ASSAY OF UREA NITROGEN: CPT

## 2024-08-21 PROCEDURE — 86704 HEP B CORE ANTIBODY TOTAL: CPT

## 2024-08-21 PROCEDURE — 80307 DRUG TEST PRSMV CHEM ANLYZR: CPT

## 2024-08-21 PROCEDURE — 36415 COLL VENOUS BLD VENIPUNCTURE: CPT

## 2024-08-21 PROCEDURE — 86832 HLA CLASS I HIGH DEFIN QUAL: CPT | Mod: OUT | Performed by: SURGERY

## 2024-08-21 PROCEDURE — 86665 EPSTEIN-BARR CAPSID VCA: CPT

## 2024-08-21 PROCEDURE — 83090 ASSAY OF HOMOCYSTEINE: CPT

## 2024-08-21 PROCEDURE — 83718 ASSAY OF LIPOPROTEIN: CPT

## 2024-08-21 PROCEDURE — 82150 ASSAY OF AMYLASE: CPT

## 2024-08-21 PROCEDURE — 86664 EPSTEIN-BARR NUCLEAR ANTIGEN: CPT

## 2024-08-21 PROCEDURE — 86706 HEP B SURFACE ANTIBODY: CPT

## 2024-08-21 PROCEDURE — 86481 TB AG RESPONSE T-CELL SUSP: CPT

## 2024-08-21 PROCEDURE — G0103 PSA SCREENING: HCPCS

## 2024-08-21 PROCEDURE — 86780 TREPONEMA PALLIDUM: CPT

## 2024-08-21 RX ORDER — SEVELAMER CARBONATE 800 MG/1
800 TABLET, FILM COATED ORAL
COMMUNITY

## 2024-08-21 RX ORDER — EPLERENONE 25 MG/1
25 TABLET, FILM COATED ORAL DAILY
COMMUNITY

## 2024-08-22 ENCOUNTER — LAB REQUISITION (OUTPATIENT)
Dept: LAB | Facility: CLINIC | Age: 74
End: 2024-08-22
Payer: MEDICARE

## 2024-08-22 DIAGNOSIS — N18.6 END STAGE RENAL DISEASE (MULTI): ICD-10-CM

## 2024-08-22 LAB
FLOW AUTOCROSSMATCH: NORMAL
HLA RESULTS: NORMAL
HLA-A+B+C AB NFR SER: NORMAL %
HLA-DP+DQ+DR AB NFR SER: NORMAL %

## 2024-08-23 DIAGNOSIS — Z01.818 PRE-TRANSPLANT EVALUATION FOR KIDNEY TRANSPLANT: ICD-10-CM

## 2024-08-23 LAB
AMPHETAMINES SERPL QL SCN: NEGATIVE NG/ML
ANNOTATION COMMENT IMP: NORMAL
BARBITURATES SERPL QL SCN: NEGATIVE NG/ML
BENZODIAZ SERPL QL SCN: NEGATIVE NG/ML
BUPRENORPHINE SERPL-MCNC: NEGATIVE NG/ML
CANNABINOIDS SERPL QL SCN: NEGATIVE NG/ML
COCAINE SERPL QL SCN: NEGATIVE NG/ML
FLOW AUTOCROSSMATCH: NORMAL
HLA RESULTS: NORMAL
METHADONE SERPL QL SCN: NEGATIVE NG/ML
METHAMPHET SERPL QL: NEGATIVE NG/ML
NIL(NEG) CONTROL SPOT COUNT: NORMAL
OPIATES SERPL QL SCN: NEGATIVE NG/ML
OXYCODONE SERPL QL: NEGATIVE NG/ML
PANEL A SPOT COUNT: 0
PANEL B SPOT COUNT: 0
PCP SERPL QL SCN: NEGATIVE NG/ML
POS CONTROL SPOT COUNT: NORMAL
PSA FREE MFR SERPL: 23 %
PSA FREE SERPL-MCNC: 1.3 NG/ML
PSA SERPL IA-MCNC: 5.7 NG/ML (ref 0–4)
T-SPOT. TB INTERPRETATION: NEGATIVE

## 2024-08-25 LAB
COTININE SERPL-MCNC: <5 NG/ML
NICOTINE SERPL-MCNC: <5 NG/ML

## 2024-08-26 ENCOUNTER — HOSPITAL ENCOUNTER (OUTPATIENT)
Dept: RADIOLOGY | Facility: CLINIC | Age: 74
Discharge: HOME | End: 2024-08-26
Payer: MEDICARE

## 2024-08-26 DIAGNOSIS — Z01.818 PRE-TRANSPLANT EVALUATION FOR KIDNEY TRANSPLANT: ICD-10-CM

## 2024-08-26 LAB
HLA RESULTS: NORMAL
HLA-A+B+C AB NFR SER: NORMAL %
HLA-DP+DQ+DR AB NFR SER: NORMAL %

## 2024-08-26 PROCEDURE — 76705 ECHO EXAM OF ABDOMEN: CPT | Performed by: STUDENT IN AN ORGANIZED HEALTH CARE EDUCATION/TRAINING PROGRAM

## 2024-08-26 PROCEDURE — 76705 ECHO EXAM OF ABDOMEN: CPT

## 2024-08-29 ENCOUNTER — TELEPHONE (OUTPATIENT)
Dept: TRANSPLANT | Facility: HOSPITAL | Age: 74
End: 2024-08-29

## 2024-08-30 ASSESSMENT — PATIENT HEALTH QUESTIONNAIRE - PHQ9
6. FEELING BAD ABOUT YOURSELF - OR THAT YOU ARE A FAILURE OR HAVE LET YOURSELF OR YOUR FAMILY DOWN: NOT AT ALL
5. POOR APPETITE OR OVEREATING: NOT AT ALL
SUM OF ALL RESPONSES TO PHQ QUESTIONS 1-9: 2
3. TROUBLE FALLING OR STAYING ASLEEP OR SLEEPING TOO MUCH: SEVERAL DAYS
4. FEELING TIRED OR HAVING LITTLE ENERGY: SEVERAL DAYS
7. TROUBLE CONCENTRATING ON THINGS, SUCH AS READING THE NEWSPAPER OR WATCHING TELEVISION: NOT AT ALL
8. MOVING OR SPEAKING SO SLOWLY THAT OTHER PEOPLE COULD HAVE NOTICED. OR THE OPPOSITE, BEING SO FIGETY OR RESTLESS THAT YOU HAVE BEEN MOVING AROUND A LOT MORE THAN USUAL: NOT AT ALL
9. THOUGHTS THAT YOU WOULD BE BETTER OFF DEAD, OR OF HURTING YOURSELF: NOT AT ALL
SUM OF ALL RESPONSES TO PHQ9 QUESTIONS 1 & 2: 0
10. IF YOU CHECKED OFF ANY PROBLEMS, HOW DIFFICULT HAVE THESE PROBLEMS MADE IT FOR YOU TO DO YOUR WORK, TAKE CARE OF THINGS AT HOME, OR GET ALONG WITH OTHER PEOPLE: NOT DIFFICULT AT ALL
1. LITTLE INTEREST OR PLEASURE IN DOING THINGS: NOT AT ALL
2. FEELING DOWN, DEPRESSED OR HOPELESS: NOT AT ALL

## 2024-08-30 ASSESSMENT — ANXIETY QUESTIONNAIRES
1. FEELING NERVOUS, ANXIOUS, OR ON EDGE: NOT AT ALL
IF YOU CHECKED OFF ANY PROBLEMS ON THIS QUESTIONNAIRE, HOW DIFFICULT HAVE THESE PROBLEMS MADE IT FOR YOU TO DO YOUR WORK, TAKE CARE OF THINGS AT HOME, OR GET ALONG WITH OTHER PEOPLE: SOMEWHAT DIFFICULT
7. FEELING AFRAID AS IF SOMETHING AWFUL MIGHT HAPPEN: NOT AT ALL
6. BECOMING EASILY ANNOYED OR IRRITABLE: NOT AT ALL
5. BEING SO RESTLESS THAT IT IS HARD TO SIT STILL: NOT AT ALL
3. WORRYING TOO MUCH ABOUT DIFFERENT THINGS: NOT AT ALL
GAD7 TOTAL SCORE: 1
2. NOT BEING ABLE TO STOP OR CONTROL WORRYING: NOT AT ALL
4. TROUBLE RELAXING: SEVERAL DAYS

## 2024-08-30 NOTE — PROGRESS NOTES
"SW met with Pt for psychosocial update. Pt was pleasant and engaged. Pt has [employer] Medicare for insurance. Pt denied any recent hospitalizations or emergency department visits. Pt reported good compliance with his medications, medical appointments, and dialysis. Pts primary support is his wife Jennifer. Pt secondary supports are his friend Kavitha and Miguel. Pt described their mood as \"fine\". Pt denied any current MH treatments/concerns. PHQ-9 administered with a score of 2, indicating minimal clinical depression. SHANTEL-7 administered with a score of 1 indicating minimal clinical anxiety. SW offered psychotherapy resources, but Pt declined interest at this time. Pt denied any current tobacco or illicit drug use. Pt reported recent alcohol use. Shorten to one sentence, Pt reported he drinks 1-2 beers daily and last drank last night. Pt reported his alcohol consumption is \"not unusual and has remained the same throughout his working life\". SW verbalized understanding. SW encouraged abstinence from daily alcohol consumption, but Pt reported \"his alcohol consumption is within the parameters/recommendations given to him by his physician\". SW discussed the inpatient transplant stay and the need for support to be a part of education session. SW discussed the potential need for dialysis after transplant. SW also discussed the frequency of post op appointments - once a week surgeon/nephrologist visits and twice a week labs. Pt expressed understanding. Pt is low/mod psychosocial risk. Risk factors related to Pt's current alcohol use.       Plan: SW to meet with Pt in 6 months to monitor Pt's risk factors.    "

## 2024-09-04 ENCOUNTER — APPOINTMENT (OUTPATIENT)
Dept: RADIOLOGY | Facility: CLINIC | Age: 74
End: 2024-09-04
Payer: MEDICARE

## 2024-09-04 ENCOUNTER — TELEPHONE (OUTPATIENT)
Dept: PRIMARY CARE | Facility: CLINIC | Age: 74
End: 2024-09-04
Payer: MEDICARE

## 2024-09-04 NOTE — TELEPHONE ENCOUNTER
SPOKE WITH PATIENT///Patient has MRI of prostate scheduled for this Friday, wants to know if you'll prescribe a sedative to calm his nerves to take prior to procedure

## 2024-09-05 DIAGNOSIS — F41.8 SITUATIONAL ANXIETY: Primary | ICD-10-CM

## 2024-09-05 RX ORDER — LORAZEPAM 1 MG/1
1-2 TABLET ORAL SEE ADMIN INSTRUCTIONS
Qty: 4 TABLET | Refills: 0 | Status: SHIPPED | OUTPATIENT
Start: 2024-09-05 | End: 2024-10-05

## 2024-09-06 ENCOUNTER — HOSPITAL ENCOUNTER (OUTPATIENT)
Dept: RADIOLOGY | Facility: HOSPITAL | Age: 74
Discharge: HOME | End: 2024-09-06
Payer: MEDICARE

## 2024-09-06 DIAGNOSIS — Z01.818 PRE-TRANSPLANT EVALUATION FOR KIDNEY TRANSPLANT: ICD-10-CM

## 2024-09-06 PROCEDURE — 72195 MRI PELVIS W/O DYE: CPT

## 2024-09-11 ENCOUNTER — APPOINTMENT (OUTPATIENT)
Dept: UROLOGY | Facility: CLINIC | Age: 74
End: 2024-09-11
Payer: MEDICARE

## 2024-09-11 DIAGNOSIS — R97.20 ELEVATED PSA: Primary | ICD-10-CM

## 2024-09-11 DIAGNOSIS — R35.1 BENIGN PROSTATIC HYPERPLASIA WITH NOCTURIA: ICD-10-CM

## 2024-09-11 DIAGNOSIS — N40.1 BENIGN PROSTATIC HYPERPLASIA WITH NOCTURIA: ICD-10-CM

## 2024-09-11 PROCEDURE — G2211 COMPLEX E/M VISIT ADD ON: HCPCS | Performed by: UROLOGY

## 2024-09-11 PROCEDURE — 99214 OFFICE O/P EST MOD 30 MIN: CPT | Performed by: UROLOGY

## 2024-09-11 RX ORDER — SODIUM CHLORIDE, SODIUM LACTATE, POTASSIUM CHLORIDE, CALCIUM CHLORIDE 600; 310; 30; 20 MG/100ML; MG/100ML; MG/100ML; MG/100ML
20 INJECTION, SOLUTION INTRAVENOUS CONTINUOUS
OUTPATIENT
Start: 2024-09-11

## 2024-09-11 RX ORDER — CEFAZOLIN SODIUM 2 G/100ML
2 INJECTION, SOLUTION INTRAVENOUS ONCE
OUTPATIENT
Start: 2024-09-11 | End: 2024-09-11

## 2024-09-11 RX ORDER — ALFUZOSIN HYDROCHLORIDE 10 MG/1
10 TABLET, EXTENDED RELEASE ORAL DAILY
Qty: 90 TABLET | Refills: 3 | Status: SHIPPED | OUTPATIENT
Start: 2024-09-11 | End: 2025-09-11

## 2024-09-11 NOTE — PROGRESS NOTES
Subjective     This visit was completed via telemedicine. All issues as below were discussed and addressed but no physical exam was performed unless allowed by visual confirmation. If it was felt that the patient should be evaluated in clinic, then they were directed there. Patient verbally consented to visit.      Salazar Jesus is a 74 y.o. male with history of BPH with LUTS on Alfuzosin and elevated PSA. Patient is being worked up for a kidney transplant. He underwent a prostate MRI on 9/6/2024 which showed a 25.9g prostate with a PI-RADS 5 lesion. He presents today for a follow up. Patient has nocturia every hour.       Past Medical History:   Diagnosis Date    BPH (benign prostatic hyperplasia)     Dental crown present     loose; on upper left incisor    ESRD (end stage renal disease) on dialysis (Multi)     Dialysis MWF    Gout     HTN (hypertension)     Hyperlipidemia     Hypothyroidism     ALLISON on CPAP      Past Surgical History:   Procedure Laterality Date    KNEE CARTILAGE SURGERY      Knee surgery    ROTATOR CUFF REPAIR  2017    THROAT SURGERY      w/ broken jaw    WISDOM TOOTH EXTRACTION Bilateral      No family history on file.  Current Outpatient Medications   Medication Sig Dispense Refill    alfuzosin (Uroxatral) 10 mg 24 hr tablet Take 1 tablet (10 mg) by mouth once daily. 90 tablet 3    amLODIPine (Norvasc) 10 mg tablet Take 1 tablet (10 mg) by mouth once daily. (Patient taking differently: Take 0.5 tablets (5 mg) by mouth once daily.) 90 tablet 1    aspirin 81 mg EC tablet Take 1 tablet (81 mg) by mouth once daily.      cholecalciferol (Vitamin D3) 50 MCG (2000 UT) tablet Take 1 tablet (50 mcg) by mouth once daily.      clobetasol (Temovate) 0.05 % cream APPLY TO AFFECTED AREA 3 TIMES A DAY 60 g 0    doxazosin (Cardura) 1 mg tablet Take 1 tablet (1 mg) by mouth once daily at bedtime.      eplerenone (Inspra) 25 mg tablet Take 1 tablet (25 mg) by mouth once daily.      febuxostat (Uloric) 40  mg tablet Take 1 tablet (40 mg) by mouth once daily. 90 tablet 3    ferrous sulfate 325 (65 Fe) MG tablet Take 1 tablet by mouth.      fluocinolone 0.01 % cream APPLY TO AFFECTED AREAS ONCE A DAY ON THIGHS AND LEGS X 2 WEEKS THEN AS NEEDED FOR FLARES      furosemide (Lasix) 80 mg tablet Take 1 tablet (80 mg) by mouth every other day.      hydrocortisone 2.5 % cream Hydrocortisone 2.5 % External Cream  Quantity: 56   Refills: 0  Start: 25-Aug-2021      labetalol (Normodyne) 100 mg tablet Take 1.5 tablets (150 mg) by mouth 2 times a day.      levothyroxine (Synthroid, Levoxyl) 150 mcg tablet Take 1 tablet (150 mcg) by mouth once daily in the morning. Take before meals. 90 tablet 0    LORazepam (Ativan) 1 mg tablet Take 1-2 tablets (1-2 mg) by mouth see administration instructions. Take 30 minutes prior to procedure. 4 tablet 0    rosuvastatin (Crestor) 40 mg tablet Take 1 tablet (40 mg) by mouth once daily. 90 tablet 3    sevelamer carbonate (Renvela) 800 mg tablet Take 1 tablet (800 mg) by mouth 3 times daily (morning, midday, late afternoon). Swallow tablet whole; do not crush, break, or chew.      sodium bicarbonate 650 mg tablet Take 1 tablet (650 mg) by mouth 2 times a day.      Vitamin D3 50 mcg (2,000 unit) capsule TAKE 1 CAPSULE BY MOUTH EVERY DAY 90 capsule 3     No current facility-administered medications for this visit.     Allergies   Allergen Reactions    Allopurinol Itching     Social History     Socioeconomic History    Marital status:      Spouse name: Not on file    Number of children: Not on file    Years of education: Not on file    Highest education level: Not on file   Occupational History    Not on file   Tobacco Use    Smoking status: Never    Smokeless tobacco: Never   Vaping Use    Vaping status: Unknown   Substance and Sexual Activity    Alcohol use: Not Currently    Drug use: Never    Sexual activity: Defer   Other Topics Concern    Not on file   Social History Narrative    Not on  file     Social Determinants of Health     Financial Resource Strain: Not on file   Food Insecurity: Not on file   Transportation Needs: Not on file   Physical Activity: Not on file   Stress: Not on file   Social Connections: Not on file   Intimate Partner Violence: Not on file   Housing Stability: Not on file       Review of Systems  Pertinent items are noted in HPI.    Objective       Lab Review  Lab Results   Component Value Date    WBC 5.0 08/21/2024    RBC 3.38 (L) 08/21/2024    HGB 11.5 (L) 08/21/2024    HCT 34.8 (L) 08/21/2024     (L) 08/21/2024      Lab Results   Component Value Date    BUN 33 (H) 08/21/2024    CREATININE 6.12 (H) 08/21/2024      Lab Results   Component Value Date    PSA 5.7 (H) 08/21/2024           Assessment/Plan   Diagnoses and all orders for this visit:  Elevated PSA  -     Case Request Operating Room: Biopsy Prostate with Navigation; Standing  Other orders  -     Place in outpatient/hospital ambulatory surgery; Standing  -     Full code; Standing  -     NPO Diet Except: Sips with meds; Effective now; Standing  -     Height and weight; Standing  -     Insert and maintain peripheral IV; Standing  -     Saline lock IV; Standing  -     Inpatient consult to Respiratory Care; Standing  -     lactated Ringer's infusion  -     ceFAZolin (Ancef) 2 g in dextrose (iso)  mL    Elevated PSA   PI-RADS 5 lesion     I reviewed prostate MRI from 9/6/2024 which showed a 25.9g prostate with a PI-RADS 5 lesion involving the entirety of the right peripheral zone extending from the base to the midgland. The lesion extends superiorly to involve the proximal right seminal vesicle. The lesion broadly abuts the adjacent prostatic capsule with irregularity along the right posterolateral peripheral zone concerning for extraprostatic extension.    Despite patients advanced age biopsy is indicated because of ongoing evaluation for kidney transplant.     I recommend proceeding with fusion guided prostate  biopsy.     I reviewed with him the risks of fusion guided prostate biopsy which include hematuria, rectal bleeding, UTI, urosepsis, urinary retention, discomfort, and missing prostate cancer diagnosis. Antibiotic will be administered prior to the procedure to minimize those risks. This will be scheduled with Dr. Nunes under sedation.     2. BPH with LUTS     We will continue to monitor and refill Alfuzosin.     All questions were answered to the patient's satisfaction. Patient agrees with the plan and wishes to proceed. Follow-up will be scheduled appropriately.     E&M visit today is associated with current or anticipated ongoing medical care services related to a patient's single, serious condition or a complex condition.    Scribed for Dr. Singh by Nikki Wood. I , Dr Singh, have personally reviewed and agreed with the information entered by the Virtual Scribe.

## 2024-09-12 DIAGNOSIS — R97.20 ELEVATED PSA: ICD-10-CM

## 2024-09-12 DIAGNOSIS — R35.1 BENIGN PROSTATIC HYPERPLASIA WITH NOCTURIA: ICD-10-CM

## 2024-09-12 DIAGNOSIS — Z01.818 PREOP TESTING: ICD-10-CM

## 2024-09-12 DIAGNOSIS — N40.1 BENIGN PROSTATIC HYPERPLASIA WITH NOCTURIA: ICD-10-CM

## 2024-09-12 DIAGNOSIS — N28.89 OTHER SPECIFIED DISORDERS OF KIDNEY AND URETER: ICD-10-CM

## 2024-09-13 ENCOUNTER — APPOINTMENT (OUTPATIENT)
Dept: RADIOLOGY | Facility: CLINIC | Age: 74
End: 2024-09-13
Payer: MEDICARE

## 2024-09-17 ENCOUNTER — DOCUMENTATION (OUTPATIENT)
Dept: TRANSPLANT | Facility: HOSPITAL | Age: 74
End: 2024-09-17
Payer: MEDICARE

## 2024-09-26 ENCOUNTER — HOSPITAL ENCOUNTER (OUTPATIENT)
Dept: RADIOLOGY | Facility: HOSPITAL | Age: 74
Discharge: HOME | End: 2024-09-26
Payer: MEDICARE

## 2024-09-26 VITALS — HEIGHT: 65 IN | BODY MASS INDEX: 26.82 KG/M2 | WEIGHT: 161 LBS

## 2024-09-26 DIAGNOSIS — Z01.818 PRE-TRANSPLANT EVALUATION FOR KIDNEY TRANSPLANT: ICD-10-CM

## 2024-09-26 PROCEDURE — 2550000001 HC RX 255 CONTRASTS: Performed by: STUDENT IN AN ORGANIZED HEALTH CARE EDUCATION/TRAINING PROGRAM

## 2024-09-26 PROCEDURE — A9575 INJ GADOTERATE MEGLUMI 0.1ML: HCPCS | Performed by: STUDENT IN AN ORGANIZED HEALTH CARE EDUCATION/TRAINING PROGRAM

## 2024-09-26 PROCEDURE — 75563 CARD MRI W/STRESS IMG & DYE: CPT

## 2024-09-26 RX ORDER — GADOTERATE MEGLUMINE 376.9 MG/ML
29 INJECTION INTRAVENOUS
Status: COMPLETED | OUTPATIENT
Start: 2024-09-26 | End: 2024-09-26

## 2024-09-26 NOTE — NURSING NOTE
MRI STRESS        Stress protocol: Regadenoson  Regadenoson Dose: 0.4mg  Aminophylline Dose: 100mg     Resting Vitals:  BP:187/72  HR: 63bpm   SPO2: 99% RA  Resting ECG: NSR with left axis deviation and poor R wave progression     Stress:  0.4mg IV push Regadenoson:  1 min: /73 HR 66bpm 92% RA symptoms: none  2 min: /73 HR 74 100% RA symptoms: none     Reversal/Recovery:  100mg IV push Aminophylline:  1 min: BP  146/75 HR 72bpm 94% RA symptoms: none  2 min: /74 HR 67bpm 94% RA symptoms: none  4 min: /72 HR 64bpm 96% RA symptoms: none  6 min: /72 HR 66bpm 92% RA symptoms: none     Patients resting HR of 63 bpm jesús to a maximum of 74 bpm.  Resting BP of 187/72 was the highest of the exam. Patients post stress EKG remained unchanged.  Patient discharged from the [facility] to home.

## 2024-09-30 PROBLEM — Z01.818 PREOP EXAMINATION: Status: ACTIVE | Noted: 2024-09-30

## 2024-10-01 ENCOUNTER — OFFICE VISIT (OUTPATIENT)
Dept: CARDIOLOGY | Facility: HOSPITAL | Age: 74
End: 2024-10-01
Payer: MEDICARE

## 2024-10-01 VITALS
DIASTOLIC BLOOD PRESSURE: 75 MMHG | WEIGHT: 163 LBS | BODY MASS INDEX: 27.16 KG/M2 | HEIGHT: 65 IN | OXYGEN SATURATION: 96 % | HEART RATE: 65 BPM | SYSTOLIC BLOOD PRESSURE: 158 MMHG

## 2024-10-01 DIAGNOSIS — Z01.818 PREOP EXAMINATION: Primary | ICD-10-CM

## 2024-10-01 PROCEDURE — 3078F DIAST BP <80 MM HG: CPT | Performed by: INTERNAL MEDICINE

## 2024-10-01 PROCEDURE — 99214 OFFICE O/P EST MOD 30 MIN: CPT | Performed by: INTERNAL MEDICINE

## 2024-10-01 PROCEDURE — 1159F MED LIST DOCD IN RCRD: CPT | Performed by: INTERNAL MEDICINE

## 2024-10-01 PROCEDURE — 1126F AMNT PAIN NOTED NONE PRSNT: CPT | Performed by: INTERNAL MEDICINE

## 2024-10-01 PROCEDURE — 3077F SYST BP >= 140 MM HG: CPT | Performed by: INTERNAL MEDICINE

## 2024-10-01 PROCEDURE — 1160F RVW MEDS BY RX/DR IN RCRD: CPT | Performed by: INTERNAL MEDICINE

## 2024-10-01 ASSESSMENT — PAIN SCALES - GENERAL: PAINLEVEL: 0-NO PAIN

## 2024-10-01 NOTE — PROGRESS NOTES
Subjective   Salazar Jesus is a 74 y.o. male presents for cardiology review as part of a renal transplant workup with a background of hypertension hyperlipidemia, hypothyroidism, gout, ALLISON on CPAP, BPH.    Investigations:  TTE (2023) - LVEF 60%, normal RV systolic function, no significant valvular abnormalities. RVSP 17 mmHg.   CMR (2024) - 1. No evidence of inducible myocardial ischemia utilizing stress  perfusion imaging. 2. There is no significant LGE/scar that would suggest prior infarct  or infiltrative process. 3. Normal LV size (EDVi-72 ml/m2), and systolic function.  Quantitative LVEF 61 %. 4. No evidence of myocardial inflammation/edema on T2-weighted imaging (T2 mapping, STIR). 5. Normal RV size(EDVi-59 ml/m2) and systolic function. Quantitative RVEF 62%.  Nuclear stress test () - no definite ischemia.   CACS () - 523.     Chief Complaint:  Consult    HPI  No chest pain.   Active ++, exercises alternate days (outdoor/off-road cycling) for 2 hours (3-days a week).   Can walk up a flight of stairs without issue. Can achieve 4 METs.  No ankle swelling, orthopnea or PND.  Has been on HD for ~ 2 years.  No palpitations or blackouts.    CV risk:  Ex-smoker - quit .  Fam hx - mother had CABG,  MI.   Not diabetic HbA1c 4.4%  HLD - on rosuvastatin 40mg daily.   HTN - usually under control.     ROS  A 10-system review was performed and was unremarkable apart from what is presented in the HPI.     Objective   Physical Exam  Alert and orientated.   Appropriate responses, normal affect.  No respiratory distress at rest.   Skin warm and dry.   Normal radial pulse character and volume. Clinically SR.   Anicteric sclera, no conjunctival pallor.   No JVD or carotid bruits.  Heart sounds dual, no added heart sounds or audible murmurs.   Chest clear on auscultation.   Calves soft, non-tender.  No pedal edema.  ECG - SR, left axis, inferior q-waves.     Lab Review:   Lab Results   Component Value  "Date     04/22/2023    K 3.7 04/22/2023     04/22/2023    CO2 24 04/22/2023    BUN 33 (H) 08/21/2024    CREATININE 6.12 (H) 08/21/2024    GLUCOSE 110 (H) 04/22/2023    CALCIUM 9.5 04/22/2023     No results found for: \"CKTOTAL\", \"CKMB\", \"CKMBINDEX\", \"TROPONINI\"  Lab Results   Component Value Date    WBC 5.0 08/21/2024    HGB 11.5 (L) 08/21/2024    HCT 34.8 (L) 08/21/2024     (H) 08/21/2024     (L) 08/21/2024     Lab Results   Component Value Date    CHOL 159 08/21/2024    TRIG 184 (H) 07/10/2024    HDL 42.9 08/21/2024       Assessment/Plan   In summary, Salazar Jesus is a 74 y.o. male presents for cardiology review as part of a renal transplant workup with a background of hypertension hyperlipidemia, hypothyroidism, gout, ALLISON on CPAP, BPH.  He is asymptomatic from a cardiac viewpoint and has excellent reported exercise capacity for his age, being able to cycle for 2 hours 3x/week.  There is no significant prior cardiac history.  He was euvolemic on examination today with mild systolic hypertension.  He was recently investigated with a cardiac MRI that showed normal biventricular systolic function with no evidence of inducible ischemia or prior infarction.  At this point he does not require additional cardiac testing ahead of a planned surgery. RCRI 2, 10.1% 30-day risk of MACE.           "

## 2024-10-01 NOTE — PATIENT INSTRUCTIONS
Thank you for attending the Cardiology clinic at [facility] today. It was nice to meet you.     Your cardiovascular examination was within normal limits. Your ECG showed a normal heart rhythm.    Your recent stress cardiac MRI showed normal heart muscle function with no evidence of significant blockages in the heart arteries or scarring of the heart muscle.    Your echocardiogram showed normal heart muscle contraction    You do not require additional heart testing at this point.    Continue your current medications.

## 2024-10-03 ENCOUNTER — PRE-ADMISSION TESTING (OUTPATIENT)
Dept: PREADMISSION TESTING | Facility: HOSPITAL | Age: 74
End: 2024-10-03
Payer: MEDICARE

## 2024-10-03 ENCOUNTER — LAB (OUTPATIENT)
Dept: LAB | Facility: LAB | Age: 74
End: 2024-10-03
Payer: MEDICARE

## 2024-10-03 VITALS
WEIGHT: 161.6 LBS | SYSTOLIC BLOOD PRESSURE: 141 MMHG | RESPIRATION RATE: 18 BRPM | BODY MASS INDEX: 26.92 KG/M2 | HEART RATE: 64 BPM | HEIGHT: 65 IN | DIASTOLIC BLOOD PRESSURE: 76 MMHG | TEMPERATURE: 98.2 F | OXYGEN SATURATION: 97 %

## 2024-10-03 DIAGNOSIS — N40.1 BENIGN PROSTATIC HYPERPLASIA WITH NOCTURIA: ICD-10-CM

## 2024-10-03 DIAGNOSIS — R35.1 BENIGN PROSTATIC HYPERPLASIA WITH NOCTURIA: ICD-10-CM

## 2024-10-03 DIAGNOSIS — Z01.818 PREOP TESTING: ICD-10-CM

## 2024-10-03 DIAGNOSIS — N28.89 OTHER SPECIFIED DISORDERS OF KIDNEY AND URETER: ICD-10-CM

## 2024-10-03 DIAGNOSIS — R97.20 ELEVATED PSA: ICD-10-CM

## 2024-10-03 LAB
ANION GAP SERPL CALC-SCNC: 14 MMOL/L (ref 10–20)
BUN SERPL-MCNC: 20 MG/DL (ref 6–23)
CALCIUM SERPL-MCNC: 9.8 MG/DL (ref 8.6–10.3)
CHLORIDE SERPL-SCNC: 94 MMOL/L (ref 98–107)
CO2 SERPL-SCNC: 33 MMOL/L (ref 21–32)
CREAT SERPL-MCNC: 3.96 MG/DL (ref 0.5–1.3)
EGFRCR SERPLBLD CKD-EPI 2021: 15 ML/MIN/1.73M*2
ERYTHROCYTE [DISTWIDTH] IN BLOOD BY AUTOMATED COUNT: 13.4 % (ref 11.5–14.5)
GLUCOSE SERPL-MCNC: 95 MG/DL (ref 74–99)
HCT VFR BLD AUTO: 33.2 % (ref 41–52)
HGB BLD-MCNC: 11 G/DL (ref 13.5–17.5)
INR PPP: 1 (ref 0.9–1.2)
MCH RBC QN AUTO: 32.8 PG (ref 26–34)
MCHC RBC AUTO-ENTMCNC: 33.1 G/DL (ref 32–36)
MCV RBC AUTO: 99 FL (ref 80–100)
NRBC BLD-RTO: ABNORMAL /100{WBCS}
PLATELET # BLD AUTO: 117 X10*3/UL (ref 150–450)
POTASSIUM SERPL-SCNC: 4.2 MMOL/L (ref 3.5–5.3)
PROTHROMBIN TIME: 10 SECONDS (ref 9.3–12.7)
RBC # BLD AUTO: 3.35 X10*6/UL (ref 4.5–5.9)
SODIUM SERPL-SCNC: 137 MMOL/L (ref 136–145)
WBC # BLD AUTO: 5.3 X10*3/UL (ref 4.4–11.3)

## 2024-10-03 PROCEDURE — 87086 URINE CULTURE/COLONY COUNT: CPT

## 2024-10-03 PROCEDURE — 85027 COMPLETE CBC AUTOMATED: CPT

## 2024-10-03 PROCEDURE — 99204 OFFICE O/P NEW MOD 45 MIN: CPT | Performed by: PHYSICIAN ASSISTANT

## 2024-10-03 PROCEDURE — 85610 PROTHROMBIN TIME: CPT

## 2024-10-03 PROCEDURE — 80048 BASIC METABOLIC PNL TOTAL CA: CPT

## 2024-10-03 PROCEDURE — 36415 COLL VENOUS BLD VENIPUNCTURE: CPT

## 2024-10-03 ASSESSMENT — ENCOUNTER SYMPTOMS
EYES NEGATIVE: 1
ENDOCRINE NEGATIVE: 1
RESPIRATORY NEGATIVE: 1
CARDIOVASCULAR NEGATIVE: 1
NEUROLOGICAL NEGATIVE: 1
NECK NEGATIVE: 1
GASTROINTESTINAL NEGATIVE: 1
MUSCULOSKELETAL NEGATIVE: 1
CONSTITUTIONAL NEGATIVE: 1

## 2024-10-03 ASSESSMENT — DUKE ACTIVITY SCORE INDEX (DASI)
CAN YOU WALK INDOORS, SUCH AS AROUND YOUR HOUSE: YES
DASI METS SCORE: 9
CAN YOU PARTICIPATE IN MODERATE RECREATIONAL ACTIVITIES LIKE GOLF, BOWLING, DANCING, DOUBLES TENNIS OR THROWING A BASEBALL OR FOOTBALL: YES
CAN YOU PARTICIPATE IN STRENOUS SPORTS LIKE SWIMMING, SINGLES TENNIS, FOOTBALL, BASKETBALL, OR SKIING: NO
TOTAL_SCORE: 50.7
CAN YOU CLIMB A FLIGHT OF STAIRS OR WALK UP A HILL: YES
CAN YOU DO YARD WORK LIKE RAKING LEAVES, WEEDING OR PUSHING A MOWER: YES
CAN YOU HAVE SEXUAL RELATIONS: YES
CAN YOU WALK A BLOCK OR TWO ON LEVEL GROUND: YES
CAN YOU DO LIGHT WORK AROUND THE HOUSE LIKE DUSTING OR WASHING DISHES: YES
CAN YOU DO MODERATE WORK AROUND THE HOUSE LIKE VACUUMING, SWEEPING FLOORS OR CARRYING GROCERIES: YES
CAN YOU RUN A SHORT DISTANCE: YES
CAN YOU DO HEAVY WORK AROUND THE HOUSE LIKE SCRUBBING FLOORS OR LIFTING AND MOVING HEAVY FURNITURE: YES
CAN YOU TAKE CARE OF YOURSELF (EAT, DRESS, BATHE, OR USE TOILET): YES

## 2024-10-03 ASSESSMENT — LIFESTYLE VARIABLES: SMOKING_STATUS: NONSMOKER

## 2024-10-03 ASSESSMENT — PAIN - FUNCTIONAL ASSESSMENT: PAIN_FUNCTIONAL_ASSESSMENT: 0-10

## 2024-10-03 ASSESSMENT — PAIN SCALES - GENERAL: PAINLEVEL_OUTOF10: 0 - NO PAIN

## 2024-10-03 NOTE — PREPROCEDURE INSTRUCTIONS
Medication List            Accurate as of October 3, 2024 10:51 AM. Always use your most recent med list.                alfuzosin 10 mg 24 hr tablet  Commonly known as: Uroxatral  Take 1 tablet (10 mg) by mouth once daily.  Medication Adjustments for Surgery: Take/Use as prescribed     amLODIPine 10 mg tablet  Commonly known as: Norvasc  Take 1 tablet (10 mg) by mouth once daily.  Medication Adjustments for Surgery: Take/Use as prescribed     aspirin 81 mg EC tablet  Additional Medication Adjustments for Surgery: Other (Comment)  Notes to patient: Obtain cardiologist recommendations     doxazosin 1 mg tablet  Commonly known as: Cardura  Medication Adjustments for Surgery: Take/Use as prescribed     febuxostat 40 mg tablet  Commonly known as: Uloric  Take 1 tablet (40 mg) by mouth once daily.  Medication Adjustments for Surgery: Take/Use as prescribed     hydrocortisone 2.5 % cream  Medication Adjustments for Surgery: Take/Use as prescribed     labetalol 100 mg tablet  Commonly known as: Normodyne  Medication Adjustments for Surgery: Take/Use as prescribed     levothyroxine 150 mcg tablet  Commonly known as: Synthroid, Levoxyl  Take 1 tablet (150 mcg) by mouth once daily in the morning. Take before meals.  Medication Adjustments for Surgery: Take/Use as prescribed     rosuvastatin 40 mg tablet  Commonly known as: Crestor  Take 1 tablet (40 mg) by mouth once daily.  Medication Adjustments for Surgery: Take/Use as prescribed     sevelamer carbonate 800 mg tablet  Commonly known as: Renvela  Medication Adjustments for Surgery: Take/Use as prescribed     sodium bicarbonate 650 mg tablet  Medication Adjustments for Surgery: Take/Use as prescribed     Vitamin D3 50 mcg (2,000 unit) capsule  Generic drug: cholecalciferol  TAKE 1 CAPSULE BY MOUTH EVERY DAY  Additional Medication Adjustments for Surgery: Take last dose 7 days before surgery                        Thank you for visiting [facility].  If  you have any changes to your health condition, please call the surgeons office to alert them and give them details of your symptoms.    Viri Dorman PA-C  P: [phone]  Department of Anesthesiology and Perioperative Medicine  --    Preoperative Fasting Guidelines    Why must I stop eating and drinking near surgery time?  With sedation, food or liquid in your stomach can enter your lungs causing serious complications  Increases nausea and vomiting    When do I need to stop eating and drinking before my surgery?  Do not eat any food after midnight the night before your surgery/procedure.  You may have up to 13.5 ounces of clear liquid until TWO hours before your instructed arrival time to the hospital.  This includes water, black tea/coffee, (no milk or cream) apple juice, and electrolyte drinks (Gatorade)  You may chew gum until TWO hours before your surgery/procedure              Preoperative Brain Exercises    What are brain exercises?  A brain exercise is any activity that engages your thinking (cognitive) skills.    What types of activities are considered brain exercises?  Jigsaw puzzles, crossword puzzles, word jumble, memory games, word search, and many more.  Many can be found free online or on your phone via a mobile charlette.    Why should I do brain exercises before my surgery?  More recent research has shown brain exercise before surgery can lower the risk of postoperative delirium (confusion) which can be especially important for older adults.  Patients who did brain exercises for 5 to 10 hours the days before surgery, cut their risk of postoperative delirium in half up to 1 week after surgery.                  [facility]    Preoperative Deep Breathing Exercises    Why it is important to do deep breathing exercises before my surgery?  Deep breathing exercises strengthen your breathing muscles.  This helps you to recover after your surgery and decreases the chance of breathing  complications.      How are the deep breathing exercises done?  Sit straight with your back supported.  Breathe in deeply and slowly through your nose. Your lower rib cage should expand and your abdomen may move forward.  Hold that breath for 3 to 5 seconds.  Breathe out through pursed lips, slowly and completely.  Rest and repeat 10 times every hour while awake.  Rest longer if you become dizzy or lightheaded.      Patient Information: Incentive Spirometer  What is an incentive spirometer?  An incentive spirometer is a device used before and after surgery to “exercise” your lungs.  It helps you to take deeper breaths to expand your lungs.  Below is an example of a basic incentive spirometer.  The device you receive may differ slightly but they all function the same.    Why do I need to use an incentive spirometer?  Using your incentive spirometer prepares your lungs for surgery and helps prevent lung problems after surgery.  How do I use my incentive spirometer?  When you're using your incentive spirometer, make sure to breathe through your mouth. If you breathe through your nose, the incentive spirometer won't work properly. You can hold your nose if you have trouble.  If you feel dizzy at any time, stop and rest. Try again at a later time.  Follow the steps below:  Set up your incentive spirometer, expand the flexible tubing and connect to the outlet.  Sit upright in a chair or bed. Hold the incentive spirometer at eye level.   Put the mouthpiece in your mouth and close your lips tightly around it. Slowly breathe out (exhale) completely.  Breathe in (inhale) slowly through your mouth as deeply as you can. As you take a breath, you will see the piston rise inside the large column. While the piston rises, the indicator should move upwards. It should stay in between the 2 arrows (see Figure).  Try to get the piston as high as you can, while keeping the indicator between the arrows.   If the indicator doesn't stay  between the arrows, you're breathing either too fast or too slow.  When you get it as high as you can, hold your breath for 10 seconds, or as long as possible. While you're holding your breath, the piston will slowly fall to the base of the spirometer.  Once the piston reaches the bottom of the spirometer, breathe out slowly through your mouth. Rest for a few seconds.  Repeat 10 times. Try to get the piston to the same level with each breath.  Repeat every hour while awake  You can carefully clean the outside of the mouthpiece with an alcohol wipe or soap and water.            Patient and Family Education             Ways You Can Help Prevent Blood Clots             This handout explains some simple things you can do to help prevent blood clots.      Blood clots are blockages that can form in the body's veins. When a blood clot forms in your deep veins, it may be called a deep vein thrombosis, or DVT for short. Blood clots can happen in any part of the body where blood flows, but they are most common in the arms and legs. If a piece of a blood clot breaks free and travels to the lungs, it is called a pulmonary embolus (PE). A PE can be a very serious problem.         Being in the hospital or having surgery can raise your chances of getting a blood clot because you may not be well enough to move around as much as you normally do.         Ways you can help prevent blood clots in the hospital         Wearing SCDs. SCDs stands for Sequential Compression Devices.   SCDs are special sleeves that wrap around your legs  They attach to a pump that fills them with air to gently squeeze your legs every few minutes.   This helps return the blood in your legs to your heart.   SCDs should only be taken off when walking or bathing.   SCDs may not be comfortable, but they can help save your life.               Wearing compression stockings - if your doctor orders them. These special snug fitting stockings gently squeeze your legs  to help blood flow.       Walking. Walking helps move the blood in your legs.   If your doctor says it is ok, try walking the halls at least   5 times a day. Ask us to help you get up, so you don't fall.      Taking any blood thinning medicines your doctor orders.             [facility]; 3/23       Ways you can help prevent blood clots at home       Wearing compression stockings - if your doctor orders them. ? Walking - to help move the blood in your legs.       Taking any blood thinning medicines your doctor orders.      Signs of a blood clot or PE      Tell your doctor or nurse know right away if you have of the problems listed below.    If you are at home, seek medical care right away. Call 911 for chest pain or problems breathing.               Signs of a blood clot (DVT) - such as pain,  swelling, redness or warmth in your arm or leg      Signs of a pulmonary embolism (PE) - such as chest     pain or feeling short of breath           The Week before Surgery        Seven days before Surgery  Check your [facility] medication instructions  Do the exercises provided to you by [facility]   Arrange for a responsible, adult licensed  to take you home after surgery and stay with you for 24 hours.  You will not be permitted to drive yourself home if you have received any anesthetic/sedation  Six days before surgery  Check your [facility] medication instructions  Do the exercises provided to you by [facility]   Start using Chlorhexidene (CHG) body wash if prescribed  Five days before surgery  Check your [facility] medication instructions  Do the exercises provided to you by [facility]   Continue to use CHG body wash if prescribed  Three days before surgery  Check your [facility] medication instructions  Do the exercises provided to you by [facility]   Continue to use CHG body wash if prescribed  Two days before surgery  Check your [facility] medication instructions  Do the exercises provided to you by [facility]   Continue to use CHG body wash if prescribed    The Day  before Surgery       Check your [facility] medication and all other [facility] instructions including when to stop eating and drinking  You will be called with your arrival time for surgery in the late afternoon.  If you do not receive a call please reach out to your surgeon's office.  Do not smoke or drink 24 hours before surgery  Prepare items to bring with you to the hospital  Shower with your chlorhexidine wash if prescribed  Brush your teeth and use your chlorhexidine dental rinse if prescribed    The Day of Surgery       Check your [facility] medication instructions  Ensure you follow the instructions for when to stop eating and drinking  Shower, if prescribed use CHG.  Do not apply any lotions, creams, moisturizers, perfume or deodorant  Brush your teeth and use your CHG dental rinse if prescribed  Wear loose comfortable clothing  Avoid make-up  Remove  jewelry and piercings, consider professional piercing removal with a plastic spacer if needed  Bring photo ID and Insurance card  Bring an accurate medication list that includes medication dose, frequency and allergies  Bring a copy of your advanced directives (will, health care power of )  Bring any devices and controllers as well as medical devices you have been provided with for surgery (CPAP, slings, braces, etc.)  Dentures, eyeglasses, and contacts will be removed before surgery, please bring cases for contacts or glasses

## 2024-10-03 NOTE — CPM/PAT H&P
CPM/PAT Evaluation       Name: Salazar Jesus (Salazar Jesus)  /Age: 1950/74 y.o.     Visit Type:   In-Person       Chief Complaint: prostate biopsy    HPI: Patient is a 73 yo M scheduled for prostate biopsy on 10/15/2024 with Dr. Nunes secondary to urinary frequency.  Patient was referred by Dr. Nunes  to [facility] today for perioperative risk stratification and optimization. Patient's PMHx is notable for HTN, HLD, hypothyroidism, ESRD on dialysis MWF, gout, ALLISON on CPAP      Past Medical History:   Diagnosis Date    BPH (benign prostatic hyperplasia)     Dental crown present     loose; on upper left incisor    ESRD (end stage renal disease) on dialysis (Multi)     Dialysis MWF    Gout     Hemodialysis status (CMS-[facility])     Lt arm fistula    HTN (hypertension)     Hyperlipidemia     Hypothyroidism     ALLISON on CPAP        Past Surgical History:   Procedure Laterality Date    KNEE CARTILAGE SURGERY Left     Knee surgery    ROTATOR CUFF REPAIR Right 2017    THROAT SURGERY      w/ broken jaw    WISDOM TOOTH EXTRACTION Bilateral        Patient Sexual activity questions deferred to the physician.    Family History   Problem Relation Name Age of Onset    Heart disease Mother      Stroke Father         Allergies   Allergen Reactions    Allopurinol Itching       Prior to Admission medications    Medication Sig Start Date End Date Taking? Authorizing Provider   alfuzosin (Uroxatral) 10 mg 24 hr tablet Take 1 tablet (10 mg) by mouth once daily. 24 Yes Rosemary Singh MD   amLODIPine (Norvasc) 10 mg tablet Take 1 tablet (10 mg) by mouth once daily.  Patient taking differently: Take 0.5 tablets (5 mg) by mouth once daily. 24 Yes Christopher D'Amico, DO   aspirin 81 mg EC tablet Take 1 tablet (81 mg) by mouth once daily.   Yes Historical Provider, MD   doxazosin (Cardura) 1 mg tablet Take 1 tablet (1 mg) by mouth once daily at bedtime.   Yes Historical Provider, MD   febuxostat (Uloric) 40 mg  tablet Take 1 tablet (40 mg) by mouth once daily. 2/15/24 2/14/25 Yes Christopher D'Amico, DO   hydrocortisone 2.5 % cream once daily as needed for rash. 8/25/21  Yes Historical Provider, MD   labetalol (Normodyne) 100 mg tablet Take 1.5 tablets (150 mg) by mouth 2 times a day.   Yes Historical Provider, MD   levothyroxine (Synthroid, Levoxyl) 150 mcg tablet Take 1 tablet (150 mcg) by mouth once daily in the morning. Take before meals. 7/8/24  Yes Christopher D'Amico, DO   rosuvastatin (Crestor) 40 mg tablet Take 1 tablet (40 mg) by mouth once daily. 3/24/23 10/3/24 Yes Christopher D'Amico, DO   sevelamer carbonate (Renvela) 800 mg tablet Take 1 tablet (800 mg) by mouth 3 times daily (morning, midday, late afternoon). Swallow tablet whole; do not crush, break, or chew.   Yes Historical Provider, MD   sodium bicarbonate 650 mg tablet Take 1 tablet (650 mg) by mouth 2 times a day.   Yes Historical Provider, MD   Vitamin D3 50 mcg (2,000 unit) capsule TAKE 1 CAPSULE BY MOUTH EVERY DAY 1/30/24  Yes Christopher D'Amico, DO   cholecalciferol (Vitamin D3) 50 MCG (2000 UT) tablet Take 1 tablet (50 mcg) by mouth once daily.  10/3/24 Yes Historical Provider, MD   clobetasol (Temovate) 0.05 % cream APPLY TO AFFECTED AREA 3 TIMES A DAY 10/30/23 10/1/24  Christopher D'Amico, DO   eplerenone (Inspra) 25 mg tablet Take 1 tablet (25 mg) by mouth once daily.  10/1/24  Historical Provider, MD   ferrous sulfate 325 (65 Fe) MG tablet Take 1 tablet by mouth.  10/1/24  Historical Provider, MD   fluocinolone 0.01 % cream APPLY TO AFFECTED AREAS ONCE A DAY ON THIGHS AND LEGS X 2 WEEKS THEN AS NEEDED FOR FLARES  10/1/24  Historical Provider, MD   furosemide (Lasix) 80 mg tablet Take 1 tablet (80 mg) by mouth every other day.  10/1/24  Historical Provider, MD   LORazepam (Ativan) 1 mg tablet Take 1-2 tablets (1-2 mg) by mouth see administration instructions. Take 30 minutes prior to procedure. 9/5/24 10/1/24  Christopher D'Amico, DO         PAT ROS:   Constitutional:   neg    Neuro/Psych:   neg    Eyes:   neg    Ears:   neg    Nose:   neg    Mouth:   neg    Throat:   neg    Neck:   neg    Cardio:   neg    Respiratory:   neg    Endocrine:   neg    GI:   neg    :    See hpi  Musculoskeletal:   neg    Hematologic:   neg    Skin:  neg        Physical Exam  Vitals and nursing note reviewed.   Constitutional:       General: He is not in acute distress.     Appearance: He is normal weight. He is not ill-appearing or toxic-appearing.   HENT:      Head: Normocephalic and atraumatic.      Right Ear: External ear normal.      Left Ear: External ear normal.      Nose: Nose normal.      Mouth/Throat:      Mouth: Mucous membranes are moist.   Eyes:      Extraocular Movements: Extraocular movements intact.      Conjunctiva/sclera: Conjunctivae normal.   Neck:      Vascular: No carotid bruit.   Cardiovascular:      Rate and Rhythm: Normal rate and regular rhythm.      Pulses: Normal pulses.      Heart sounds: Normal heart sounds.      Arteriovenous access: Left arteriovenous access is present.  Pulmonary:      Effort: Pulmonary effort is normal.      Breath sounds: Normal breath sounds.   Abdominal:      General: There is no distension.      Palpations: Abdomen is soft.      Tenderness: There is no abdominal tenderness.   Musculoskeletal:         General: Normal range of motion.      Cervical back: Normal range of motion.   Skin:     General: Skin is warm and dry.      Capillary Refill: Capillary refill takes less than 2 seconds.   Neurological:      General: No focal deficit present.      Mental Status: He is alert.   Psychiatric:         Mood and Affect: Mood normal.         Behavior: Behavior normal.          PAT AIRWAY:   Airway:     Mallampati::  II    TM distance::  >3 FB    Neck ROM::  Full   Upper right bridge, cap upper left      Visit Vitals  /76   Pulse 64   Temp 36.8 °C (98.2 °F) (Temporal)   Resp 18       DASI Risk Score      Flowsheet Row  Pre-Admission Testing from 10/3/2024 in [facility] Questionnaire Series Submission from 9/12/2024 in [facility] with Generic Provider Tereso   Can you take care of yourself (eat, dress, bathe, or use toilet)?  2.75 filed at 10/03/2024 1105 2.75  filed at 09/12/2024 1147   Can you walk indoors, such as around your house? 1.75 filed at 10/03/2024 1105 1.75  filed at 09/12/2024 1147   Can you walk a block or two on level ground?  2.75 filed at 10/03/2024 1105 2.75  filed at 09/12/2024 1147   Can you climb a flight of stairs or walk up a hill? 5.5 filed at 10/03/2024 1105 5.5  filed at 09/12/2024 1147   Can you run a short distance? 8 filed at 10/03/2024 1105 8  filed at 09/12/2024 1147   Can you do light work around the house like dusting or washing dishes? 2.7 filed at 10/03/2024 1105 2.7  filed at 09/12/2024 1147   Can you do moderate work around the house like vacuuming, sweeping floors or carrying groceries? 3.5 filed at 10/03/2024 1105 3.5  filed at 09/12/2024 1147   Can you do heavy work around the house like scrubbing floors or lifting and moving heavy furniture?  8 filed at 10/03/2024 1105 8  filed at 09/12/2024 1147   Can you do yard work like raking leaves, weeding or pushing a mower? 4.5 filed at 10/03/2024 1105 4.5  filed at 09/12/2024 1147   Can you have sexual relations? 5.25 filed at 10/03/2024 1105 5.25  filed at 09/12/2024 1147   Can you participate in moderate recreational activities like golf, bowling, dancing, doubles tennis or throwing a baseball or football? 6 filed at 10/03/2024 1105 6  filed at 09/12/2024 1147   Can you participate in strenous sports like swimming, singles tennis, football, basketball, or skiing? 0 filed at 10/03/2024 1105 0  filed at 09/12/2024 1147   DASI SCORE 50.7 filed at 10/03/2024 1105 50.7  filed at 09/12/2024 1147   METS Score (Will be calculated only when all the questions are answered) 9 filed at 10/03/2024 1105 9  filed at 09/12/2024 1147           Caprini DVT Assessment      Flowsheet Row Pre-Admission Testing from 10/3/2024 in [facility]   DVT Score 5 filed at 10/03/2024 1104   Surgical Factors Minor surgery planned filed at 10/03/2024 1104   BMI 30 or less filed at 10/03/2024 1104          Modified Frailty Index      Flowsheet Row Pre-Admission Testing from 10/3/2024 in [facility]   Non-independent functional status (problems with dressing, bathing, personal grooming, or cooking) 0 filed at 10/03/2024 1106   History of diabetes mellitus  0 filed at 10/03/2024 1106   History of COPD 0 filed at 10/03/2024 1106   History of CHF No filed at 10/03/2024 1106   History of MI 0 filed at 10/03/2024 1106   History of Percutaneous Coronary Intervention, Cardiac Surgery, or Angina No filed at 10/03/2024 1106   Hypertension requiring the use of medication  0.0909 filed at 10/03/2024 1106   Peripheral vascular disease 0 filed at 10/03/2024 1106   Impaired sensorium (cognitive impairement or loss, clouding, or delirium) 0 filed at 10/03/2024 1106   TIA or CVA withouy residual deficit 0 filed at 10/03/2024 1106   Cerebrovascular accident with deficit 0 filed at 10/03/2024 1106   Modified Frailty Index Calculator .0909 filed at 10/03/2024 1106          CHADS2 Stroke Risk  Current as of 2 hours ago        N/A 3 to 100%: High Risk   2 to < 3%: Medium Risk   0 to < 2%: Low Risk     Last Change: N/A          This score determines the patient's risk of having a stroke if the patient has atrial fibrillation.        This score is not applicable to this patient. Components are not calculated.          Revised Cardiac Risk Index      Flowsheet Row Pre-Admission Testing from 10/3/2024 in [facility]   High-Risk Surgery (Intraperitoneal, Intrathoracic,Suprainguinal vascular) 0 filed at 10/03/2024 1106   History of ischemic heart disease (History of MI, History of positive exercuse test, Current chest paint considered due to  myocardial ischemia, Use of nitrate therapy, ECG with pathological Q Waves) 0 filed at 10/03/2024 1106   History of congestive heart failure (pulmonary edemia, bilateral rales or S3 gallop, Paroxysmal nocturnal dyspnea, CXR showing pulmonary vascular redistribution) 0 filed at 10/03/2024 1106   History of cerebrovascular disease (Prior TIA or stroke) 0 filed at 10/03/2024 1106   Pre-operative insulin treatment 0 filed at 10/03/2024 1106   Pre-operative creatinine>2 mg/dl 1 filed at 10/03/2024 1106   Revised Cardiac Risk Calculator 1 filed at 10/03/2024 1106          Apfel Simplified Score      Flowsheet Row Pre-Admission Testing from 10/3/2024 in [facility]   Smoking status 1 filed at 10/03/2024 1106   History of motion sickness or PONV  0 filed at 10/03/2024 1106   Use of postoperative opioids 0 filed at 10/03/2024 1106   Gender - Female 0=No filed at 10/03/2024 1106   Apfel Simplified Score Calculator 1 filed at 10/03/2024 1106          Risk Analysis Index Results This Encounter    No data found in the last 10 encounters.       Stop Bang Score      Flowsheet Row Pre-Admission Testing from 10/3/2024 in [facility]   Do you snore loudly? 1 filed at 10/03/2024 1031   Do you often feel tired or fatigued after your sleep? 0 filed at 10/03/2024 1031   Has anyone ever observed you stop breathing in your sleep? 1 filed at 10/03/2024 1031   Do you have or are you being treated for high blood pressure? 1 filed at 10/03/2024 1031   Recent BMI (Calculated) 26.9 filed at 10/03/2024 1031   Is BMI greater than 35 kg/m2? 0=No filed at 10/03/2024 1031   Age older than 50 years old? 1=Yes filed at 10/03/2024 1031   Is your neck circumference greater than 17 inches (Male) or 16 inches (Female)? 1 filed at 10/03/2024 1031   Gender - Male 1=Yes filed at 10/03/2024 1031   STOP-BANG Total Score 6 filed at 10/03/2024 1031              Assessment and Plan:     Neuro:  No neurologic diagnoses, however, the  patient is at an increased risk for post operative delirium secondary to age >/= 65 and renal insuff..  Preoperative brain exercise educational handout provided to patient.    The patient is at an increased risk for perioperative stroke secondary to chronic renal failure , increased age, and HTN.    HEENT/Airway: No diagnosis or significant findings on chart review or clinical presentation and evaluation.    Cardiovascular:   -HTN - on amlodipine (continue), doxazosin (continue), labetalol (continue)  -HLD- on rosuvastatin (continue)   No additional preoperative testing is currently indicated.  ASA3  EKG 24 normal sinus  METS are  RCRI  2 which is 10.1%  30 day risk of MACE (risk for cardiac death, nonfatal myocardial infarction, and nonfactal cardiac arrest  KIMBERLEY score which indicates a  0.3 % risk of intraoperative or 30-day postoperative MACE    Discussed patient with cardiologist Dr. Gaspar, okay to proceed with procedure without further testing. As there's no history of MI/PCI and he's asymptomatic from a cardiac viewpoint can hold the aspirin prior to the biopsy.       Pulmonary:   -ALLISON - CPAP compliant  Preoperative deep breathing educational handout provided to patient.    ARISCAT:  3    points which is a low (1.6%) risk of in-hospital post-op pulmonary complications   PRODIGY:   >15  points which is a high risk of post op opioid induced respiratory depression episodes  STOP BAN   points which is a high risk for moderate to severe ALLISON    Renal:   -ESRD on dialysis MWF - on sodium bicarb, sevelamer (continue)  The patient is at increased risk of perioperative renal complications secondary to age>/= 56, male sex, HTN, and renal insuff (preop creat >1.2 mg/dl). Preventative measures include compliance with dialysis, BP control  CMP ordered today    Endocrine:    -Hypothyroidism - on levothyroxine (continue)    Hematologic:  No diagnosis or significant findings on chart review or clinical presentation  and evaluation.  Preoperative DVT educational handout provided to patient.    Caprini Score:   5 points which is a moderate risk of perioperative VTE    Gastrointestinal:   No diagnosis or significant findings on chart review or clinical presentation and evaluation.    Apfel: 1 points 21% risk for post operative N/V    Infectious disease:  No diagnosis or significant findings on chart review or clinical presentation and evaluation.    Musculoskeletal:    -gout- on uloric (continue)    Anesthesia:  No anesthesia complications    No dental issues  Former Smoker  No personal/family issues with Anesthesia  No nickel, shell fish, or iodine allergies    Discussed with patient medication instructions, NPO guidelines, and any questions or concerns.      Per Caridiology note/instructions from Dr. Gaspar 10/01/2024:   He is asymptomatic from a cardiac viewpoint and has excellent reported exercise capacity for his age, being able to cycle for 2 hours 3x/week.  There is no significant prior cardiac history.  He was euvolemic on examination today with mild systolic hypertension.  He was recently investigated with a cardiac MRI that showed normal biventricular systolic function with no evidence of inducible ischemia or prior infarction.  At this point he does not require additional cardiac testing ahead of a planned surgery. RCRI 2, 10.1% 30-day risk of MACE.     I discussed with Dr. Gaspar, ok for patient to proceed with prostate biopsy patient can hold asa 81mg 5-7 days prior to procedure. Patient was notified, also notified to make sure he is compliant with dialysis.      Viri Dorman PA-C 10/3/2024 5:20 PM

## 2024-10-04 DIAGNOSIS — E03.9 HYPOTHYROIDISM, UNSPECIFIED TYPE: ICD-10-CM

## 2024-10-04 LAB — BACTERIA UR CULT: NORMAL

## 2024-10-06 RX ORDER — LEVOTHYROXINE SODIUM 150 UG/1
150 TABLET ORAL
Qty: 90 TABLET | Refills: 0 | Status: SHIPPED | OUTPATIENT
Start: 2024-10-06

## 2024-10-15 ENCOUNTER — ANESTHESIA EVENT (OUTPATIENT)
Dept: OPERATING ROOM | Facility: HOSPITAL | Age: 74
End: 2024-10-15
Payer: MEDICARE

## 2024-10-15 ENCOUNTER — ANESTHESIA (OUTPATIENT)
Dept: OPERATING ROOM | Facility: HOSPITAL | Age: 74
End: 2024-10-15
Payer: MEDICARE

## 2024-10-15 ENCOUNTER — HOSPITAL ENCOUNTER (OUTPATIENT)
Facility: HOSPITAL | Age: 74
Setting detail: OUTPATIENT SURGERY
Discharge: HOME | End: 2024-10-15
Attending: STUDENT IN AN ORGANIZED HEALTH CARE EDUCATION/TRAINING PROGRAM | Admitting: STUDENT IN AN ORGANIZED HEALTH CARE EDUCATION/TRAINING PROGRAM
Payer: MEDICARE

## 2024-10-15 VITALS
BODY MASS INDEX: 27.22 KG/M2 | DIASTOLIC BLOOD PRESSURE: 73 MMHG | SYSTOLIC BLOOD PRESSURE: 129 MMHG | OXYGEN SATURATION: 97 % | WEIGHT: 163.36 LBS | HEART RATE: 58 BPM | RESPIRATION RATE: 18 BRPM | TEMPERATURE: 97.3 F | HEIGHT: 65 IN

## 2024-10-15 DIAGNOSIS — R97.20 ELEVATED PSA: Primary | ICD-10-CM

## 2024-10-15 PROCEDURE — 7100000001 HC RECOVERY ROOM TIME - INITIAL BASE CHARGE: Performed by: STUDENT IN AN ORGANIZED HEALTH CARE EDUCATION/TRAINING PROGRAM

## 2024-10-15 PROCEDURE — 3600000004 HC OR TIME - INITIAL BASE CHARGE - PROCEDURE LEVEL FOUR: Performed by: STUDENT IN AN ORGANIZED HEALTH CARE EDUCATION/TRAINING PROGRAM

## 2024-10-15 PROCEDURE — 55700 PR PROSTATE NEEDLE BIOPSY ANY APPROACH: CPT | Performed by: STUDENT IN AN ORGANIZED HEALTH CARE EDUCATION/TRAINING PROGRAM

## 2024-10-15 PROCEDURE — 7100000010 HC PHASE TWO TIME - EACH INCREMENTAL 1 MINUTE: Performed by: STUDENT IN AN ORGANIZED HEALTH CARE EDUCATION/TRAINING PROGRAM

## 2024-10-15 PROCEDURE — 3700000002 HC GENERAL ANESTHESIA TIME - EACH INCREMENTAL 1 MINUTE: Performed by: STUDENT IN AN ORGANIZED HEALTH CARE EDUCATION/TRAINING PROGRAM

## 2024-10-15 PROCEDURE — 2500000004 HC RX 250 GENERAL PHARMACY W/ HCPCS (ALT 636 FOR OP/ED): Performed by: ANESTHESIOLOGY

## 2024-10-15 PROCEDURE — 7100000009 HC PHASE TWO TIME - INITIAL BASE CHARGE: Performed by: STUDENT IN AN ORGANIZED HEALTH CARE EDUCATION/TRAINING PROGRAM

## 2024-10-15 PROCEDURE — 2500000004 HC RX 250 GENERAL PHARMACY W/ HCPCS (ALT 636 FOR OP/ED): Performed by: STUDENT IN AN ORGANIZED HEALTH CARE EDUCATION/TRAINING PROGRAM

## 2024-10-15 PROCEDURE — 2500000004 HC RX 250 GENERAL PHARMACY W/ HCPCS (ALT 636 FOR OP/ED): Performed by: UROLOGY

## 2024-10-15 PROCEDURE — 76872 US TRANSRECTAL: CPT | Performed by: STUDENT IN AN ORGANIZED HEALTH CARE EDUCATION/TRAINING PROGRAM

## 2024-10-15 PROCEDURE — 7100000002 HC RECOVERY ROOM TIME - EACH INCREMENTAL 1 MINUTE: Performed by: STUDENT IN AN ORGANIZED HEALTH CARE EDUCATION/TRAINING PROGRAM

## 2024-10-15 PROCEDURE — 3600000009 HC OR TIME - EACH INCREMENTAL 1 MINUTE - PROCEDURE LEVEL FOUR: Performed by: STUDENT IN AN ORGANIZED HEALTH CARE EDUCATION/TRAINING PROGRAM

## 2024-10-15 PROCEDURE — 2500000004 HC RX 250 GENERAL PHARMACY W/ HCPCS (ALT 636 FOR OP/ED): Mod: JZ | Performed by: UROLOGY

## 2024-10-15 PROCEDURE — 3700000001 HC GENERAL ANESTHESIA TIME - INITIAL BASE CHARGE: Performed by: STUDENT IN AN ORGANIZED HEALTH CARE EDUCATION/TRAINING PROGRAM

## 2024-10-15 RX ORDER — SODIUM CHLORIDE, SODIUM LACTATE, POTASSIUM CHLORIDE, CALCIUM CHLORIDE 600; 310; 30; 20 MG/100ML; MG/100ML; MG/100ML; MG/100ML
100 INJECTION, SOLUTION INTRAVENOUS CONTINUOUS
Status: DISCONTINUED | OUTPATIENT
Start: 2024-10-15 | End: 2024-10-15 | Stop reason: HOSPADM

## 2024-10-15 RX ORDER — SODIUM CHLORIDE, SODIUM LACTATE, POTASSIUM CHLORIDE, CALCIUM CHLORIDE 600; 310; 30; 20 MG/100ML; MG/100ML; MG/100ML; MG/100ML
20 INJECTION, SOLUTION INTRAVENOUS CONTINUOUS
Status: DISCONTINUED | OUTPATIENT
Start: 2024-10-15 | End: 2024-10-15 | Stop reason: HOSPADM

## 2024-10-15 RX ORDER — LIDOCAINE HYDROCHLORIDE 10 MG/ML
0.1 INJECTION, SOLUTION EPIDURAL; INFILTRATION; INTRACAUDAL; PERINEURAL ONCE
Status: DISCONTINUED | OUTPATIENT
Start: 2024-10-15 | End: 2024-10-15 | Stop reason: HOSPADM

## 2024-10-15 RX ORDER — ALBUTEROL SULFATE 0.83 MG/ML
2.5 SOLUTION RESPIRATORY (INHALATION) ONCE AS NEEDED
Status: DISCONTINUED | OUTPATIENT
Start: 2024-10-15 | End: 2024-10-15 | Stop reason: HOSPADM

## 2024-10-15 RX ORDER — KETOROLAC TROMETHAMINE 30 MG/ML
INJECTION, SOLUTION INTRAMUSCULAR; INTRAVENOUS AS NEEDED
Status: DISCONTINUED | OUTPATIENT
Start: 2024-10-15 | End: 2024-10-15

## 2024-10-15 RX ORDER — PROPOFOL 10 MG/ML
INJECTION, EMULSION INTRAVENOUS AS NEEDED
Status: DISCONTINUED | OUTPATIENT
Start: 2024-10-15 | End: 2024-10-15

## 2024-10-15 RX ORDER — HYDROMORPHONE HYDROCHLORIDE 0.2 MG/ML
0.1 INJECTION INTRAMUSCULAR; INTRAVENOUS; SUBCUTANEOUS EVERY 5 MIN PRN
Status: DISCONTINUED | OUTPATIENT
Start: 2024-10-15 | End: 2024-10-15 | Stop reason: HOSPADM

## 2024-10-15 RX ORDER — ONDANSETRON HYDROCHLORIDE 2 MG/ML
4 INJECTION, SOLUTION INTRAVENOUS ONCE AS NEEDED
Status: DISCONTINUED | OUTPATIENT
Start: 2024-10-15 | End: 2024-10-15 | Stop reason: HOSPADM

## 2024-10-15 RX ORDER — LIDOCAINE HYDROCHLORIDE 10 MG/ML
INJECTION, SOLUTION EPIDURAL; INFILTRATION; INTRACAUDAL; PERINEURAL AS NEEDED
Status: DISCONTINUED | OUTPATIENT
Start: 2024-10-15 | End: 2024-10-15

## 2024-10-15 RX ORDER — BUPIVACAINE HYDROCHLORIDE 5 MG/ML
INJECTION, SOLUTION PERINEURAL AS NEEDED
Status: DISCONTINUED | OUTPATIENT
Start: 2024-10-15 | End: 2024-10-15 | Stop reason: HOSPADM

## 2024-10-15 RX ORDER — FENTANYL CITRATE 50 UG/ML
INJECTION, SOLUTION INTRAMUSCULAR; INTRAVENOUS AS NEEDED
Status: DISCONTINUED | OUTPATIENT
Start: 2024-10-15 | End: 2024-10-15

## 2024-10-15 RX ORDER — CEFAZOLIN SODIUM 2 G/100ML
2 INJECTION, SOLUTION INTRAVENOUS ONCE
Status: COMPLETED | OUTPATIENT
Start: 2024-10-15 | End: 2024-10-15

## 2024-10-15 RX ORDER — MIDAZOLAM HYDROCHLORIDE 1 MG/ML
INJECTION, SOLUTION INTRAMUSCULAR; INTRAVENOUS AS NEEDED
Status: DISCONTINUED | OUTPATIENT
Start: 2024-10-15 | End: 2024-10-15

## 2024-10-15 RX ORDER — HYDRALAZINE HYDROCHLORIDE 20 MG/ML
5 INJECTION INTRAMUSCULAR; INTRAVENOUS EVERY 30 MIN PRN
Status: DISCONTINUED | OUTPATIENT
Start: 2024-10-15 | End: 2024-10-15 | Stop reason: HOSPADM

## 2024-10-15 SDOH — HEALTH STABILITY: MENTAL HEALTH: CURRENT SMOKER: 0

## 2024-10-15 ASSESSMENT — COLUMBIA-SUICIDE SEVERITY RATING SCALE - C-SSRS
1. IN THE PAST MONTH, HAVE YOU WISHED YOU WERE DEAD OR WISHED YOU COULD GO TO SLEEP AND NOT WAKE UP?: NO
6. HAVE YOU EVER DONE ANYTHING, STARTED TO DO ANYTHING, OR PREPARED TO DO ANYTHING TO END YOUR LIFE?: NO
2. HAVE YOU ACTUALLY HAD ANY THOUGHTS OF KILLING YOURSELF?: NO

## 2024-10-15 ASSESSMENT — PAIN - FUNCTIONAL ASSESSMENT
PAIN_FUNCTIONAL_ASSESSMENT: 0-10

## 2024-10-15 ASSESSMENT — PAIN SCALES - GENERAL
PAINLEVEL_OUTOF10: 0 - NO PAIN

## 2024-10-15 NOTE — OP NOTE
Biopsy Prostate with Navigation (Transrectal US, UroNav, MRI at [facility]) Operative Note     Date: 10/15/2024  OR Location: [facility] OR    Name: Salazar Jesus, : 1950, Age: 74 y.o., MRN: [id number], Sex: male    Diagnosis  Pre-op Diagnosis      * Elevated PSA [R97.20] Post-op Diagnosis     * Elevated PSA [R97.20]     Procedures  Biopsy Prostate with Navigation (Transrectal US, UroNav, MRI at [facility])  19455 - G US GUIDANCE NEEDLE PLACEMENT IMG S&I      Surgeons      * Moncho Nunes - Primary    Resident/Fellow/Other Assistant:  Surgeons and Role:  * No surgeons found with a matching role *    Procedure Summary      Estimated Blood Loss (ml)  5.   Specimen(s) Removed  Removed region of interest and systematic biopsies.   Drain(s)  None.   Implant(s)  None.   Complications  None.   Indications (History)  Elevated PSA.   Findings of Procedure  25g prostate, papa heterogenous.   Description of Procedure  After administration of anesthesia, a prostate block was performed and transrectal ultrasound. Transperineal biopsies taken from region of interest and systematic template performed using the precision point device.

## 2024-10-15 NOTE — ANESTHESIA PREPROCEDURE EVALUATION
Patient: Salazar Jesus    Procedure Information       Date/Time: 10/15/24 0750    Procedure: Biopsy Prostate with Navigation (Transrectal US, UroNav, MRI at [place])    Location: [facility] OR 01 / Virtual [facility] OR    Surgeons: Moncho Nunse MD          Past Medical History:   Diagnosis Date    BPH (benign prostatic hyperplasia)     Dental crown present     loose; on upper left incisor    ESRD (end stage renal disease) on dialysis (Multi)     Dialysis MWF    Gout     Hemodialysis status (CMS-HCC)     Lt arm fistula    HTN (hypertension)     Hyperlipidemia     Hypothyroidism     ALLISON on CPAP         Relevant Problems   Cardiac   (+) Essential hypertension   (+) HLD (hyperlipidemia)      /Renal   (+) Benign prostatic hyperplasia with nocturia   (+) ESRD (end stage renal disease) (Multi)   (+) End-stage renal disease on hemodialysis (Multi)      Endocrine   (+) Hypothyroidism   (+) Secondary hyperparathyroidism of renal origin (Multi)      Hematology   (+) Anemia of chronic disease      HEENT   (+) Bilateral sensorineural hearing loss   (+) Hearing deficit      ID   (+) Shingles     Past Surgical History:   Procedure Laterality Date    KNEE CARTILAGE SURGERY Left     Knee surgery    ROTATOR CUFF REPAIR Right 2017    THROAT SURGERY      w/ broken jaw    WISDOM TOOTH EXTRACTION Bilateral       Clinical information reviewed:   Tobacco  Allergies  Meds   Med Hx  Surg Hx   Fam Hx  Soc Hx        NPO Detail:  No data recorded     Physical Exam    Airway  Mallampati: II  TM distance: >3 FB  Neck ROM: limited     Cardiovascular    Dental    Pulmonary    Abdominal            Anesthesia Plan    History of general anesthesia?: yes  History of complications of general anesthesia?: no    ASA 3     MAC     The patient is not a current smoker.  Patient was not previously instructed to abstain from smoking on day of procedure.  Patient did not smoke on day of procedure.    intravenous induction   Postoperative administration  of opioids is intended.  Anesthetic plan and risks discussed with patient.    Plan discussed with attending.

## 2024-10-15 NOTE — DISCHARGE INSTRUCTIONS
Dr. Nunes - [facility]  Phone: [phone]    Follow-Up Information    Following your Prostate Biopsy, please follow up with Dr. Nunes as scheduled    Medication  Please take the medications as prescribed by your doctor. Tylenol or non-steroids! anti-inflammatory medications (such as Aleve®) should relieve mild pain and discomfort. Resume the usual medications you took before surgery unless instructed otherwise.    Resuming Activities and Driving  *NO Driving on the day of surgery  *You may resume driving the day after surgery  *You may resume normal activities as tolerated  *You may shower     Diet  You may resume your normal diet once at home, with no additional considerations.    Expected Signs and Symptoms  You may have a small amount of bleeding with urination on occasion. This may be accompanied with small blood clots. This is normal and should be relieved by increasing your fluid intake.  You may experience some mild burning and discomfort during urination. This is normal and should subside in one to two weeks.      When to Call Your Doctor - Dr. Nunes [phone]  Please call the office immediately if any of the following symptoms appear:    *Inability to eat, drink, or take medication  *Persistent Nausea or Vomiting  *Fever over 100.4 degrees F. (38 degrees C.)    Please call 9-1-1 if you experience any of the following:  *Chest Pain, Shortness of Breath or Difficulty Breathing  *Sudden one sided weakness/slurred speech/ any signs or symptoms of a stroke  *Severe headache or visual disturbance    Additional Home-Going Instructions for Adults Who Have Had Anesthesia or Sedatives:    The anesthetics, sedatives and pain killers which were given to you will be acting in your body for the next 24 hours.  This may cause you to feel sleepy.  This feeling will slowly wear off.    For the next 24 hours you SHOULD NOT:  *Drive a car, or operate machinery or power tools  *Drink any form of alcohol  (including beer or wine)  *Make any important Decisions

## 2024-10-15 NOTE — ANESTHESIA POSTPROCEDURE EVALUATION
Patient: Salazar Jesus    Procedure Summary       Date: 10/15/24 Room / Location: [facility] OR 01 / [facility]    Anesthesia Start: 0755 Anesthesia Stop: 0822    Procedure: Biopsy Prostate with Navigation (Transrectal US, UroNav, MRI at [facility]) Diagnosis:       Elevated PSA      (Elevated PSA [R97.20])    Surgeons: Moncho Nunes MD Responsible Provider: Pollo Choi MD    Anesthesia Type: MAC ASA Status: 3            Anesthesia Type: MAC    Vitals Value Taken Time   /61 10/15/24 0822   Temp 36.7 10/15/24 0822   Pulse 67 10/15/24 0822   Resp 16 10/15/24 0822   SpO2 96 10/15/24 0822       Anesthesia Post Evaluation    Patient location during evaluation: PACU  Patient participation: complete - patient participated  Level of consciousness: awake and alert  Pain management: adequate  Airway patency: patent  Cardiovascular status: acceptable  Respiratory status: acceptable  Hydration status: acceptable  Postoperative Nausea and Vomiting: none        There were no known notable events for this encounter.

## 2024-10-15 NOTE — H&P
"History Of Present Illness  Salazar Jesus is a 74 y.o. male presenting with elevated psa.     Past Medical History  Past Medical History:   Diagnosis Date    BPH (benign prostatic hyperplasia)     Dental crown present     loose; on upper left incisor    ESRD (end stage renal disease) on dialysis (Multi)     Dialysis MWF    Gout     Hemodialysis status (CMS-[facility])     Lt arm fistula    HTN (hypertension)     Hyperlipidemia     Hypothyroidism     ALLISON on CPAP        Surgical History  Past Surgical History:   Procedure Laterality Date    KNEE CARTILAGE SURGERY Left     Knee surgery    ROTATOR CUFF REPAIR Right 2017    THROAT SURGERY      w/ broken jaw    WISDOM TOOTH EXTRACTION Bilateral         Social History  He reports that he quit smoking about 32 years ago. His smoking use included cigarettes. He has never used smokeless tobacco. He reports current alcohol use. He reports that he does not use drugs.    Family History  Family History   Problem Relation Name Age of Onset    Heart disease Mother      Stroke Father          Allergies  Allopurinol    Review of Systems     Physical Exam     Last Recorded Vitals  Blood pressure 153/75, pulse 67, temperature 36.6 °C (97.9 °F), temperature source Temporal, resp. rate 15, height 1.65 m (5' 4.96\"), weight 74.1 kg (163 lb 5.8 oz), SpO2 99%.    Relevant Results             Assessment/Plan   Assessment & Plan  Elevated PSA      biopsy       I spent  minutes in the professional and overall care of this patient.      Moncho Nunes MD    "

## 2024-10-29 ENCOUNTER — DOCUMENTATION (OUTPATIENT)
Dept: TRANSPLANT | Facility: HOSPITAL | Age: 74
End: 2024-10-29
Payer: MEDICARE

## 2024-10-31 ENCOUNTER — APPOINTMENT (OUTPATIENT)
Dept: UROLOGY | Facility: CLINIC | Age: 74
End: 2024-10-31
Payer: MEDICARE

## 2024-11-04 ENCOUNTER — APPOINTMENT (OUTPATIENT)
Dept: PRIMARY CARE | Facility: CLINIC | Age: 74
End: 2024-11-04
Payer: MEDICARE

## 2024-11-04 VITALS
DIASTOLIC BLOOD PRESSURE: 66 MMHG | HEIGHT: 65 IN | OXYGEN SATURATION: 97 % | HEART RATE: 62 BPM | BODY MASS INDEX: 26.66 KG/M2 | SYSTOLIC BLOOD PRESSURE: 137 MMHG | WEIGHT: 160 LBS

## 2024-11-04 DIAGNOSIS — M10.00 IDIOPATHIC GOUT, UNSPECIFIED CHRONICITY, UNSPECIFIED SITE: ICD-10-CM

## 2024-11-04 DIAGNOSIS — N25.81 SECONDARY HYPERPARATHYROIDISM OF RENAL ORIGIN (MULTI): ICD-10-CM

## 2024-11-04 DIAGNOSIS — E78.5 HYPERLIPIDEMIA, UNSPECIFIED HYPERLIPIDEMIA TYPE: ICD-10-CM

## 2024-11-04 DIAGNOSIS — Z99.2 END-STAGE RENAL DISEASE ON HEMODIALYSIS (MULTI): ICD-10-CM

## 2024-11-04 DIAGNOSIS — R35.1 BENIGN PROSTATIC HYPERPLASIA WITH NOCTURIA: ICD-10-CM

## 2024-11-04 DIAGNOSIS — N18.6 END-STAGE RENAL DISEASE ON HEMODIALYSIS (MULTI): ICD-10-CM

## 2024-11-04 DIAGNOSIS — I10 HYPERTENSION, UNSPECIFIED TYPE: Primary | ICD-10-CM

## 2024-11-04 DIAGNOSIS — E03.9 HYPOTHYROIDISM, UNSPECIFIED TYPE: ICD-10-CM

## 2024-11-04 DIAGNOSIS — N40.1 BENIGN PROSTATIC HYPERPLASIA WITH NOCTURIA: ICD-10-CM

## 2024-11-04 DIAGNOSIS — N18.5 CKD (CHRONIC KIDNEY DISEASE), STAGE V (MULTI): ICD-10-CM

## 2024-11-04 PROBLEM — H43.819 POSTERIOR VITREOUS DETACHMENT: Status: ACTIVE | Noted: 2024-11-04

## 2024-11-04 PROBLEM — N13.8 BENIGN PROSTATIC HYPERPLASIA WITH URINARY OBSTRUCTION: Status: ACTIVE | Noted: 2024-11-04

## 2024-11-04 PROBLEM — E87.20 ACIDOSIS, UNSPECIFIED: Status: ACTIVE | Noted: 2023-04-19

## 2024-11-04 PROBLEM — Z86.79 HISTORY OF HYPERTENSION: Status: ACTIVE | Noted: 2024-11-04

## 2024-11-04 PROBLEM — I12.0 HYPERTENSIVE KIDNEY DISEASE, STAGE V (MULTI): Status: ACTIVE | Noted: 2023-04-19

## 2024-11-04 PROBLEM — H25.10 NUCLEAR SENILE CATARACT: Status: ACTIVE | Noted: 2024-11-04

## 2024-11-04 PROBLEM — Z20.822 CONTACT WITH AND (SUSPECTED) EXPOSURE TO COVID-19: Status: ACTIVE | Noted: 2023-04-19

## 2024-11-04 PROCEDURE — 3078F DIAST BP <80 MM HG: CPT | Performed by: STUDENT IN AN ORGANIZED HEALTH CARE EDUCATION/TRAINING PROGRAM

## 2024-11-04 PROCEDURE — 1160F RVW MEDS BY RX/DR IN RCRD: CPT | Performed by: STUDENT IN AN ORGANIZED HEALTH CARE EDUCATION/TRAINING PROGRAM

## 2024-11-04 PROCEDURE — 1036F TOBACCO NON-USER: CPT | Performed by: STUDENT IN AN ORGANIZED HEALTH CARE EDUCATION/TRAINING PROGRAM

## 2024-11-04 PROCEDURE — 1124F ACP DISCUSS-NO DSCNMKR DOCD: CPT | Performed by: STUDENT IN AN ORGANIZED HEALTH CARE EDUCATION/TRAINING PROGRAM

## 2024-11-04 PROCEDURE — 3075F SYST BP GE 130 - 139MM HG: CPT | Performed by: STUDENT IN AN ORGANIZED HEALTH CARE EDUCATION/TRAINING PROGRAM

## 2024-11-04 PROCEDURE — 1159F MED LIST DOCD IN RCRD: CPT | Performed by: STUDENT IN AN ORGANIZED HEALTH CARE EDUCATION/TRAINING PROGRAM

## 2024-11-04 PROCEDURE — G0008 ADMIN INFLUENZA VIRUS VAC: HCPCS | Performed by: STUDENT IN AN ORGANIZED HEALTH CARE EDUCATION/TRAINING PROGRAM

## 2024-11-04 PROCEDURE — 99214 OFFICE O/P EST MOD 30 MIN: CPT | Performed by: STUDENT IN AN ORGANIZED HEALTH CARE EDUCATION/TRAINING PROGRAM

## 2024-11-04 PROCEDURE — 90662 IIV NO PRSV INCREASED AG IM: CPT | Performed by: STUDENT IN AN ORGANIZED HEALTH CARE EDUCATION/TRAINING PROGRAM

## 2024-11-04 PROCEDURE — G2211 COMPLEX E/M VISIT ADD ON: HCPCS | Performed by: STUDENT IN AN ORGANIZED HEALTH CARE EDUCATION/TRAINING PROGRAM

## 2024-11-04 RX ORDER — OMEGA-3S/DHA/EPA/FISH OIL/D3 300MG-1000
1 CAPSULE ORAL
COMMUNITY
Start: 2024-10-21

## 2024-11-04 ASSESSMENT — PATIENT HEALTH QUESTIONNAIRE - PHQ9
3. TROUBLE FALLING OR STAYING ASLEEP OR SLEEPING TOO MUCH: NOT AT ALL
6. FEELING BAD ABOUT YOURSELF - OR THAT YOU ARE A FAILURE OR HAVE LET YOURSELF OR YOUR FAMILY DOWN: NOT AT ALL
SUM OF ALL RESPONSES TO PHQ9 QUESTIONS 1 AND 2: 0
2. FEELING DOWN, DEPRESSED OR HOPELESS: NOT AT ALL
9. THOUGHTS THAT YOU WOULD BE BETTER OFF DEAD, OR OF HURTING YOURSELF: NOT AT ALL
1. LITTLE INTEREST OR PLEASURE IN DOING THINGS: NOT AT ALL
5. POOR APPETITE OR OVEREATING: NOT AT ALL
10. IF YOU CHECKED OFF ANY PROBLEMS, HOW DIFFICULT HAVE THESE PROBLEMS MADE IT FOR YOU TO DO YOUR WORK, TAKE CARE OF THINGS AT HOME, OR GET ALONG WITH OTHER PEOPLE: NOT DIFFICULT AT ALL
SUM OF ALL RESPONSES TO PHQ QUESTIONS 1-9: 0
4. FEELING TIRED OR HAVING LITTLE ENERGY: NOT AT ALL
8. MOVING OR SPEAKING SO SLOWLY THAT OTHER PEOPLE COULD HAVE NOTICED. OR THE OPPOSITE, BEING SO FIGETY OR RESTLESS THAT YOU HAVE BEEN MOVING AROUND A LOT MORE THAN USUAL: NOT AT ALL
7. TROUBLE CONCENTRATING ON THINGS, SUCH AS READING THE NEWSPAPER OR WATCHING TELEVISION: NOT AT ALL

## 2024-11-04 ASSESSMENT — COLUMBIA-SUICIDE SEVERITY RATING SCALE - C-SSRS
6. HAVE YOU EVER DONE ANYTHING, STARTED TO DO ANYTHING, OR PREPARED TO DO ANYTHING TO END YOUR LIFE?: NO
1. IN THE PAST MONTH, HAVE YOU WISHED YOU WERE DEAD OR WISHED YOU COULD GO TO SLEEP AND NOT WAKE UP?: NO
2. HAVE YOU ACTUALLY HAD ANY THOUGHTS OF KILLING YOURSELF?: NO

## 2024-11-04 ASSESSMENT — ENCOUNTER SYMPTOMS
OCCASIONAL FEELINGS OF UNSTEADINESS: 0
DEPRESSION: 0
LOSS OF SENSATION IN FEET: 0

## 2024-11-04 NOTE — PROGRESS NOTES
74-year-old male presenting for follow-up on multiple concerns.     HTN  Stable on current medications. Following with nephro.       HLD  Stable, tolerating regimen well     Hypothyroidism  Stable, tolerates current regimen well.  Slightly elevated TSH on last lab, normal T4.     Gout  Stable on current regimen.     ESRD  Following with nephrology, on dialysis. On transplant list.    BPH  Following with urology, prostate biopsy pending     12 point ROS reviewed and negative other than as stated in HPI     General: Alert, oriented, pleasant, in no acute distress  HEENT:   Head: normocephalic, atraumatic;    eyes: EOMI, no scleral icterus;   CV: Heart with regular rate and rhythm, normal S1/S2, no murmurs  Lungs: CTAB without wheezing, rhonchi or rales; good respiratory effort, no increased work of breathing  Neuro: Cranial nerves grossly intact; alert and oriented  Psych: Appropriate mood and affect    #HTN  - Essentially at goal in office  -Continue labetalol 100 mg 1.5 tabs, amlodipine 5 mg daily, valsartan 160 mg daily, eplerenone 25 mg daily  -Continue to follow nephrology for any changes     # HLD  -Continue rosuvastatin 40 mg daily  -Repeat lipid panel    #Hypothyroidism  -Continue levothyroxine 150 mcg  -Repeat TSH    # Gout  -Continue Uloric 40 mg daily     # ESRD #secondary hyperparathyroidism  -On transplant list  -Continue to follow with nephrology    #BPH  -Pending biopsy results  -On alfuzosin, following with urology    #ALLISON  -Compliant with pap therapy    F/U 4 months, Medicare in July     Chris D'Amico, DO

## 2024-11-07 ENCOUNTER — APPOINTMENT (OUTPATIENT)
Dept: UROLOGY | Facility: CLINIC | Age: 74
End: 2024-11-07
Payer: MEDICARE

## 2024-11-07 VITALS — TEMPERATURE: 97.3 F | WEIGHT: 161.2 LBS | BODY MASS INDEX: 26.86 KG/M2 | HEIGHT: 65 IN

## 2024-11-07 DIAGNOSIS — C61 PROSTATE CANCER (MULTI): ICD-10-CM

## 2024-11-07 LAB
LAB AP ASR DISCLAIMER: NORMAL
LAB AP BLOCK FOR ADDITIONAL STUDIES: NORMAL
LABORATORY COMMENT REPORT: NORMAL
PATH REPORT.FINAL DX SPEC: NORMAL
PATH REPORT.GROSS SPEC: NORMAL
PATH REPORT.RELEVANT HX SPEC: NORMAL
PATH REPORT.TOTAL CANCER: NORMAL
RESIDENT REVIEW: NORMAL

## 2024-11-07 PROCEDURE — 99214 OFFICE O/P EST MOD 30 MIN: CPT | Performed by: STUDENT IN AN ORGANIZED HEALTH CARE EDUCATION/TRAINING PROGRAM

## 2024-11-07 PROCEDURE — 1159F MED LIST DOCD IN RCRD: CPT | Performed by: STUDENT IN AN ORGANIZED HEALTH CARE EDUCATION/TRAINING PROGRAM

## 2024-11-07 PROCEDURE — 1126F AMNT PAIN NOTED NONE PRSNT: CPT | Performed by: STUDENT IN AN ORGANIZED HEALTH CARE EDUCATION/TRAINING PROGRAM

## 2024-11-07 ASSESSMENT — PAIN SCALES - GENERAL: PAINLEVEL_OUTOF10: 0-NO PAIN

## 2024-11-07 NOTE — PROGRESS NOTES
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 male referred by Dr. Singh for elevated PSA. Hx of BPH w/ LUTS, ESRD, anemia, HLD, HTN. MRI Prostate (9/6/24) showed 25.9 g, a PI-RADS 5 lesion involving the entirety of the right PZ extending from the base to the midgland, the lesion extends superiorly to involve the proximal right seminal vesicle, the lesion broadly abuts the adjacent prostatic capsule with irregularity along the right posterolateral PZ concerning for extraprostatic extension, no evidence of enlarged pelvic lymph nodes. 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 Currently on dialysis, 3x week. Reports urinary urgency and frequency, weak stream. Reports nocturia 2-3x.     PSA: 5.7 (8/21/24)    MRI Prostate (9/6/24): 25.9 g, a PI-RADS 5 lesion involving the entirety of the right PZ extending from the base to the midgland, the lesion extends superiorly to involve the proximal right seminal vesicle, the lesion broadly abuts the adjacent prostatic capsule with irregularity along the right posterolateral PZ concerning for extraprostatic extension, no evidence of enlarged pelvic lymph nodes.     Review of Systems:  All systems reviewed. Anything negative noted in the HPI.    Physical Exam:  Vitals signs reviewed.  Constitutional:      Appearance: Well-developed.  HENT:     Head: Normocephalic and atraumatic.  Neck:     Musculoskeletal: Normal range of motion.  Pulmonary:     Effort: Pulmonary effort is normal.  Musculoskeletal: Normal range of motion.  Skin:     General: Skin is warm and dry.  Neurological:     Mental Status: Alert and oriented to person, place, and time.  Psychiatric:        Behavior: Behavior normal.         Thought Content: Thought content normal.        Judgment: Judgment normal.    Procedures:    Assessment/Plan   Salazar Jesus is a 74 y.o. male referred by Dr. Singh for elevated PSA. Hx of BPH w/ LUTS, ESRD, anemia, HLD, HTN. MRI Prostate (9/6/24) showed 25.9 g, a PI-RADS 5 lesion involving the entirety of the right PZ extending from the base to the midgland, the lesion extends superiorly to involve the proximal right seminal vesicle, the lesion broadly abuts the adjacent prostatic capsule with irregularity along the right posterolateral PZ concerning for extraprostatic extension, no evidence of enlarged pelvic lymph nodes. 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 Currently on dialysis, 3x week. Reports urinary urgency and frequency, weak stream. Reports nocturia 2-3x. Management options including risks, benefits and alternatives discussed at length and all questions answered. Patient prefers to proceed with PSMA/PET and referral to radiation oncology.           Scribe Attestation:  By signing my name below, Tangela EDWARDS Scribe   attest that this documentation has been prepared under the direction and in the presence of Moncho Nunes MD.

## 2024-11-11 ENCOUNTER — DOCUMENTATION (OUTPATIENT)
Dept: TRANSPLANT | Facility: HOSPITAL | Age: 74
End: 2024-11-11
Payer: MEDICARE

## 2024-11-14 ENCOUNTER — TELEPHONE (OUTPATIENT)
Dept: TRANSPLANT | Facility: HOSPITAL | Age: 74
End: 2024-11-14
Payer: MEDICARE

## 2024-11-14 ENCOUNTER — COMMITTEE REVIEW (OUTPATIENT)
Dept: TRANSPLANT | Facility: HOSPITAL | Age: 74
End: 2024-11-14
Payer: MEDICARE

## 2024-11-14 NOTE — COMMITTEE REVIEW
Evaluation Date: 4/30/2021   Committee Review Date: 11/14/2024   Organ being evaluated for: Kidney     Transplant Phase:  Waitlist   Transplant Status: Inactive     Referring Physician:     Transplant Physician:       Primary Diagnosis: Hypertensive Nephrosclerosis     Committee Members:   Chet Louis; Quynh Tee   Lab Mariza Harris   Pharmacy Tawana Durbin Jody   [facility]   Transplant Spencer Campbell; Anne Marie Hamlin; Yannick Mcnamara; Cindy Rowell; Paula King; Tomasa Ortega; Jeanette Hutson   Transplant Nephrology Bony Coates; Veronica Wolff; Cha Estrada   Transplant Surgery Tita العراقي; Stan Mc       Committee Review Decision:    The candidate's evaluation was presented and discussed at the Transplant Multidisciplinary Selection Conference. After review of the candidate's diagnosis and the evaluations of the multidisciplinary team members, the committee made the following recommendations:     Patient is NOT a candidate for kidney transplant due to the following reasons:     Advanced malignancy precluding safe transplant      Lab Results   Component Value Date    HEPBSAB 78.0 (H) 08/21/2024       There is no immunization history for the selected administration types on file for this patient.

## 2024-11-14 NOTE — TELEPHONE ENCOUNTER
Spoke with patient.    Let him know that we had to remove him from the transplant list due to the recurrence of kidney cancer.    He expressed understanding and I let him know he was welcome to be evaluated at other centers if he wished.    Patient removed from list in [facility] and [facility], letter tasked out to LULU Diez.

## 2024-11-14 NOTE — LETTER
November 14, 2024    Salazar [address] Edin  [address] [place] [address]      Dear Mr. Jesus:    Our multi-disciplinary transplant team completed a review of your medical records on 11/14/2024.  I regret to inform you that it was our unanimous decision that you are no longer a Kidney transplant candidate and you have been removed from the [facility] ([facility]) transplant waiting list.    Our transplant program consists of surgeons and medical doctors who provide coverage 365 days a year, 24 hours a day.     If you have any questions or concerns regarding your insurance coverage or billing issues, a  is available to speak with you.     It is important to keep us updated of any major changes in your medical condition, contact information and health insurance coverage.     Please don't hesitate to contact us at Dept: [phone] with any questions or concerns. We look forward to working with you through this process.      Sincerely,      Paula King RN          The [facility] Toll-free Patient Services Line:  Your Resource for Organ Transplant Information    If you have a question regarding your own medical care, you always should call your transplant hospital first. However, for general organ transplant-related information, you should call the [facility] ([facility]) toll-free patient services line at [phone].  Anyone, including potential transplant candidates, candidates, recipients, family members, friends, living donors, and donor family members, can call this number to:    Talk about organ donation, living donation, the transplant process, the donation process, and transplant policies.  Get a free patient information kit with helpful booklets, waiting list and transplant information, and a list of all transplant hospitals.  Ask questions about the Organ Procurement and Transplantation Network (OPTN) web site  (http://optn.transplant.hrsa.gov/), the [facility] Web site (http://unos.org/), or the UNOS web site for living donors and transplant recipients. (http://www.transplantliving.org/).  Learn how [facility] and the OPTN can help you.  Talk about any concerns that you may have with a transplant hospital.    [employer] is a not-for-profit organization that provides the administrative services for the national OPTN under federal contract to the Health Resources and Services Administration ([facility] an agency under the U.S. Department of Health and Human Services (HHS).    [facility] and the OPTN are responsible for:    Providing educational material for patients, the public, and professionals.  Raising awareness of the need for donated organs and tissue.  Writing organ transplant policy with help from transplant professionals, transplant patients, transplant candidates, donor families, living donors, and the public.  Coordinating organ procurement, matching, and placement.  Collecting information about every organ transplant and donation that occurs in the [place].    Remember, you should contact your transplant hospital directly if you have questions or concerns about your own medical care including medical records, work-up progress, and test results.    [facility] is not your transplant hospital, and staff at [facility] will not be able to transfer you to your transplant hospital, so keep your transplant hospital’s phone number handy.    However, while you research your transplant needs and learn as much as you can about transplantation and donation, we welcome your call to our toll-free patient services line at [phone].      OS PIL Final Rev 1-

## 2024-11-20 ENCOUNTER — DOCUMENTATION (OUTPATIENT)
Dept: TRANSPLANT | Facility: HOSPITAL | Age: 74
End: 2024-11-20
Payer: MEDICARE

## 2024-11-20 NOTE — Clinical Note
Per 11/14/24 Selection patient has been removed from the list.  Left v/m for Jadyn of [place] at .

## 2024-11-25 LAB
AP SUMMARY REPORT: NORMAL
SCAN RESULT: NORMAL

## 2024-12-09 ENCOUNTER — TELEPHONE (OUTPATIENT)
Dept: RADIATION ONCOLOGY | Facility: HOSPITAL | Age: 74
End: 2024-12-09
Payer: MEDICARE

## 2024-12-10 ENCOUNTER — HOSPITAL ENCOUNTER (OUTPATIENT)
Dept: RADIATION ONCOLOGY | Facility: HOSPITAL | Age: 74
Setting detail: RADIATION/ONCOLOGY SERIES
Discharge: HOME | End: 2024-12-10
Payer: MEDICARE

## 2024-12-10 ENCOUNTER — HOSPITAL ENCOUNTER (OUTPATIENT)
Dept: RADIOLOGY | Facility: HOSPITAL | Age: 74
Discharge: HOME | End: 2024-12-10
Payer: MEDICARE

## 2024-12-10 VITALS
HEART RATE: 63 BPM | DIASTOLIC BLOOD PRESSURE: 77 MMHG | BODY MASS INDEX: 26.82 KG/M2 | RESPIRATION RATE: 18 BRPM | OXYGEN SATURATION: 96 % | SYSTOLIC BLOOD PRESSURE: 146 MMHG | WEIGHT: 161.16 LBS | TEMPERATURE: 96.8 F

## 2024-12-10 DIAGNOSIS — C61 PROSTATE CANCER (MULTI): ICD-10-CM

## 2024-12-10 PROCEDURE — 78815 PET IMAGE W/CT SKULL-THIGH: CPT | Mod: PET TUMOR INIT TX STRAT | Performed by: NUCLEAR MEDICINE

## 2024-12-10 PROCEDURE — 99205 OFFICE O/P NEW HI 60 MIN: CPT | Performed by: STUDENT IN AN ORGANIZED HEALTH CARE EDUCATION/TRAINING PROGRAM

## 2024-12-10 PROCEDURE — 3430000001 HC RX 343 DIAGNOSTIC RADIOPHARMACEUTICALS: Mod: TB | Performed by: STUDENT IN AN ORGANIZED HEALTH CARE EDUCATION/TRAINING PROGRAM

## 2024-12-10 PROCEDURE — 78815 PET IMAGE W/CT SKULL-THIGH: CPT | Mod: PI

## 2024-12-10 PROCEDURE — 99215 OFFICE O/P EST HI 40 MIN: CPT | Performed by: STUDENT IN AN ORGANIZED HEALTH CARE EDUCATION/TRAINING PROGRAM

## 2024-12-10 PROCEDURE — A9596 HC RX 343 DIAGNOSTIC RADIOPHARMACEUTICALS: HCPCS | Mod: TB | Performed by: STUDENT IN AN ORGANIZED HEALTH CARE EDUCATION/TRAINING PROGRAM

## 2024-12-10 SDOH — ECONOMIC STABILITY: FOOD INSECURITY: WITHIN THE PAST 12 MONTHS, THE FOOD YOU BOUGHT JUST DIDN'T LAST AND YOU DIDN'T HAVE MONEY TO GET MORE.: NEVER TRUE

## 2024-12-10 SDOH — ECONOMIC STABILITY: FOOD INSECURITY: WITHIN THE PAST 12 MONTHS, YOU WORRIED THAT YOUR FOOD WOULD RUN OUT BEFORE YOU GOT MONEY TO BUY MORE.: NEVER TRUE

## 2024-12-10 ASSESSMENT — ENCOUNTER SYMPTOMS
PSYCHIATRIC NEGATIVE: 1
NEUROLOGICAL NEGATIVE: 1
CONSTITUTIONAL NEGATIVE: 1
OCCASIONAL FEELINGS OF UNSTEADINESS: 0
DEPRESSION: 0
HEMATOLOGIC/LYMPHATIC NEGATIVE: 1
GASTROINTESTINAL NEGATIVE: 1
CARDIOVASCULAR NEGATIVE: 1
LOSS OF SENSATION IN FEET: 0
EYES NEGATIVE: 1
ENDOCRINE NEGATIVE: 1
SHORTNESS OF BREATH: 1

## 2024-12-10 ASSESSMENT — COLUMBIA-SUICIDE SEVERITY RATING SCALE - C-SSRS
6. HAVE YOU EVER DONE ANYTHING, STARTED TO DO ANYTHING, OR PREPARED TO DO ANYTHING TO END YOUR LIFE?: NO
2. HAVE YOU ACTUALLY HAD ANY THOUGHTS OF KILLING YOURSELF?: NO
1. IN THE PAST MONTH, HAVE YOU WISHED YOU WERE DEAD OR WISHED YOU COULD GO TO SLEEP AND NOT WAKE UP?: NO

## 2024-12-10 ASSESSMENT — PAIN SCALES - GENERAL: PAINLEVEL_OUTOF10: 0-NO PAIN

## 2024-12-10 ASSESSMENT — PATIENT HEALTH QUESTIONNAIRE - PHQ9
2. FEELING DOWN, DEPRESSED OR HOPELESS: NOT AT ALL
1. LITTLE INTEREST OR PLEASURE IN DOING THINGS: NOT AT ALL
SUM OF ALL RESPONSES TO PHQ9 QUESTIONS 1 AND 2: 0

## 2024-12-10 NOTE — PROGRESS NOTES
Radiation Oncology Nursing Note    IPSS (International Prostate Symptom Score):   12 / 35, bother 2   - He is not experiencing urinary incontinence.      - He is not experiencing dysuria, hematuria, flank pain.     Sexual function:   - Quality of erections during the last 4 weeks: 3 = Firm enough for masturbation and foreplay only  - Use of erectile dysfunction medications:  None    Bowel function:    - Denies hematochezia or pain.    - He does have a history of hemorrhoids.    - He does not have a history of inflammatory bowel disease (Crohn's, Ulcerative Colitis).    Prior Radiotherapy:  No  Radiation Treatments       No radiation treatments to show. (Treatments may have been administered in another system.)          Current Systemic Treatment:  No     Presence of Pacemaker or ICD:  No    History of Autoimmune or Connective Tissue Disorders:  No    Pain: The patient's current pain level was assessed.  They report currently having a pain of 0 out of 10.  They feel their pain is under control without the use of pain medications.    Review of Systems:  Review of Systems   Constitutional: Negative.    HENT:  Negative.     Eyes: Negative.    Respiratory:  Positive for shortness of breath (upon exertion).    Cardiovascular: Negative.    Gastrointestinal: Negative.    Endocrine: Negative.    Genitourinary:  Positive for nocturia (x4-5).    Musculoskeletal:         Left lateral mid back pain - associates with muscle   Skin:  Positive for itching (has hydrocortisone cream).   Neurological: Negative.    Hematological: Negative.    Psychiatric/Behavioral: Negative.

## 2024-12-10 NOTE — PROGRESS NOTES
Staff Physician: Milly Betancourt MD PhD  Referring Physician: Moncho Nunes MD  Date of Service: 12/10/2024  Patient name: Salazar Jesus   MRN: [id number]    RADIATION ONCOLOGY CONSULT NOTE    IDENTIFYING DATA:  DIAGNOSIS: pending PSMA PET today   Cancer Staging   Prostate cancer (Multi)  Staging form: Prostate, AJCC 8th Edition  - Clinical stage from 10/15/2024: cT3b, cN0, PSA: 5.7, Grade Group: 3 - Signed by Milly Betancourt MD PhD on 12/10/2024    DISEASE STATE: No prior treatment  DISEASE STATUS: Treatment pending work-up  Problem List Items Addressed This Visit       Prostate cancer (Multi)    Relevant Orders    Referral to Radiation Oncology     Mr. Salazar Jesus is a 74-year-old with newly diagnosed prostate adenocarcinoma, referred by Moncho Beck MD, for evaluation and discussion of treatment recommendations.    HISTORY OF PRESENT ILLNESS:  6/8/2022 PSA 3.38    8/3/2023 PSA 4.4    8/21/2024 PSA 5.7    9/6/2024 MRI prostate without gadolinium noted a 25.9 g gland, PI-RADS 5 lesion involving right PZ, extending base to mid gland, extending into the right SV, broadly abutting capsule concerning for EPE.  No abnormally enlarged lymph nodes.    10/15/2024 systematic and targeted biopsy noted GG3 disease, left anterior with intraductal carcinoma,, 9/14 cores, 1/1 targeted core that was GG 2.  Decipher 0.87    PSMA PET/CT scheduled for today    Today, Mr. Salazar Jesus is accompanied by his wife.  Symptomatically, he reports:    1.  Full independence in activities of daily living. Has ESRD, on dialysis for past 2 years. Still makes urine.   2.  Urinary function -- weak urinary stream despite on alfuzosin and doxazosin, IPSS of 12, nocturia 4x (previously was getting up every hour).  3.  Bowel function is normal.   4.  Normal appetite. No unintentional weight gain or loss.   5.  Pain score: 0/10   6.  He denies a personal history of MI or stroke. Denies history of diabetes, HgbA1c is  4.4%    PAST MEDICAL HISTORY:  Past Medical History:   Diagnosis Date    BPH (benign prostatic hyperplasia)     Chronic kidney disease     ESRD - Dialysis 3 x a week    Dental crown present     loose; on upper left incisor    ESRD (end stage renal disease) on dialysis (Multi)     Dialysis MWF    Gout     Hemodialysis status ([facility])     Lt arm fistula    HTN (hypertension)     Hyperlipidemia     Hypothyroidism     ALLISON on CPAP      PAST SURGICAL HISTORY:  Past Surgical History:   Procedure Laterality Date    KNEE CARTILAGE SURGERY Left     Knee surgery    ROTATOR CUFF REPAIR Right 2017    THROAT SURGERY      w/ broken jaw    WISDOM TOOTH EXTRACTION Bilateral      ALLERGIES:  Allergies   Allergen Reactions    Allopurinol Itching     MEDICATIONS:    Current Outpatient Medications:     alfuzosin (Uroxatral) 10 mg 24 hr tablet, Take 1 tablet (10 mg) by mouth once daily., Disp: 90 tablet, Rfl: 3    amLODIPine (Norvasc) 10 mg tablet, Take 1 tablet (10 mg) by mouth once daily., Disp: 90 tablet, Rfl: 1    aspirin 81 mg EC tablet, Take 1 tablet (81 mg) by mouth once daily., Disp: , Rfl:     doxazosin (Cardura) 1 mg tablet, Take 1 tablet (1 mg) by mouth once daily at bedtime., Disp: , Rfl:     febuxostat (Uloric) 40 mg tablet, Take 1 tablet (40 mg) by mouth once daily., Disp: 90 tablet, Rfl: 3    hydrocortisone 2.5 % cream, once daily as needed for rash., Disp: , Rfl:     labetalol (Normodyne) 100 mg tablet, Take 1.5 tablets (150 mg) by mouth 2 times a day., Disp: , Rfl:     levothyroxine (Synthroid, Levoxyl) 150 mcg tablet, TAKE 1 TABLET (150 MCG) BY MOUTH ONCE DAILY IN THE MORNING. TAKE BEFORE MEALS., Disp: 90 tablet, Rfl: 0    sevelamer carbonate (Renvela) 800 mg tablet, Take 1 tablet (800 mg) by mouth 3 times daily (morning, midday, late afternoon). Swallow tablet whole; do not crush, break, or chew., Disp: , Rfl:     sodium bicarbonate 650 mg tablet, Take 1 tablet (650 mg) by mouth 2 times a day., Disp: , Rfl:      Vitamin D3 50 mcg (2,000 unit) capsule, TAKE 1 CAPSULE BY MOUTH EVERY DAY, Disp: 90 capsule, Rfl: 3    rosuvastatin (Crestor) 40 mg tablet, Take 1 tablet (40 mg) by mouth once daily., Disp: 90 tablet, Rfl: 3   SOCIAL HISTORY:  Social History     Tobacco Use    Smoking status: Former     Current packs/day: 0.00     Types: Cigarettes     Quit date:      Years since quittin.9    Smokeless tobacco: Never   Substance Use Topics    Alcohol use: Yes     Comment: 2 drinks a day     FAMILY HISTORY:  Family History   Problem Relation Name Age of Onset    Heart disease Mother      Stroke Father         REVIEW OF SYSTEMS:  Please refer to RN note.    PHYSICAL EXAMINATION:  /77   Pulse 63   Temp 36 °C (96.8 °F) (Skin)   Resp 18   Wt 73.1 kg (161 lb 2.5 oz)   SpO2 96%   BMI 26.82 kg/m²   Constitutional: well developed, no distress, alert & oriented, cooperative  Eyes: pupils equal round and reactive to light, extraocular movements intact  Respiratory: normal work of breathing  Extremities: no clubbing or edema  Psychological: normal affect    CTCAE (v5) ADVERSE EVENTS:  Toxicity Assessment          12/10/2024    10:00   Toxicity Assessment   Treatment Site Pelvis - male   Diarrhea Grade 0   Urinary Incontinence Grade 0   Erectile Dysfunction Grade 1   Urinary Frequency Grade 1       IPSS 12, nocturia x4, bother of 2   Urinary Retention Grade 2       on alfuzosin 10mg daily and doxazosin 1mg   Urinary Urgency Grade 1       PERFORMANCE STATUS:  KPS/ECO, Normal activity with effort; some signs or symptoms of disease (ECOG equivalent 1)    LABORATORY AND IMAGING DATA:  Imaging: All imaging was personally reviewed and interpreted in clinic. Findings as per HPI and EMR.    Laboratory/Pathology:  All pertinent labs and pathology were personally reviewed and interpreted in clinic. Findings as per HPI and EMR.  Lab Results   Component Value Date    PSA 5.7 (H) 2024    PSA 4.40 (H) 2023    PSA 3.38  "06/08/2022    PSA 3.41 04/30/2021     No results found for: \"TESTOSTERONE\", \"TESTOTOTMS\"      Lab Results   Component Value Date    BUN 20 10/03/2024    CREATININE 3.96 (H) 10/03/2024    EGFR 15 (L) 10/03/2024     10/03/2024    K 4.2 10/03/2024    CL 94 (L) 10/03/2024    CO2 33 (H) 10/03/2024    CALCIUM 9.8 10/03/2024    HGBA1C 4.4 08/21/2024        IMPRESSION:  74-year-old gentleman with newly diagnosed prostate adenocarcinoma, PSA 5.7, cT3b (by MRI), GG3 (9/14 cores+, 1/1 targeted  core+, intraductal carcinoma+, Decipher 0.87), PSMA PET pending.    He falls into high-risk risk disease (invasion of right SV by MRI) prostate cancer based on his clinical-pathologic factors. He is not in any pain. Co-morbidities include ESRD (on kidney transplant list), anemia, HLD, HTN.    He is not a surgical candidate. I discussed that treatment option includes definitive external beam radiation therapy (EBRT) with 18-24 month course of hormone therapy.  I discussed various EBRT treatment regimens, including moderate hypofractionation over 20 treatments, and ultra hypofractionation over 5 treatments. His IPSS is 12 on alfuzosin and doxazosin    I discussed the logistics of radiation, including placement of fiducials into the prostate for improved and spacer gel to reduce radiation dose to the rectum.  Logistics of radiation therapy including CT-based simulation and bowel and bladder preparation were discussed, as well as risks and benefits of radiation therapy with a detailed discussion of possible short- and long-term side effects including fatigue, toxicities to the bladder, urethra, and rectum, sexual dysfunction, infertility and secondary malignancy in the radiation field.     Potential side effects from hormonal suppression therapy were discussed, which include but are not limited to sexual dysfunction, increased risk of osteoporosis and bone fractures, vasomotor 'hot flashes', weight gain and body composition changes, " elevated cholesterol and lipoprotein levels, increased risk for diabetes mellitus/hyperglycemia, cardiovascular disease, fatigue, anemia, gynecomastia, cognitive decline, and decrease in penis and/or testicle size.  The severity and/or permanence of such side effects are expected to be related to the duration of suppression. I encouraged him to exercise to help offset/reduce many of these symptoms, including bone density, weight changes, and muscle mass. Educated on daily Calcium and Vitamin D supplementation.     Provided PSMA PET is M0, he does qualify for the -013 trial randomizing patients to 5 or 20 fractions of radiation. Prostate size 25.9cc, IPSS 12, ECOG 1. He wants to enroll if eligible.    Mr. Jesus and his wife asked a number of excellent questions that demonstrated good understanding, and would like to proceed with radiation.    PLAN:  - consent provided   -follow up PSMA PET results today   -plan for 18m of ADT  -we will coordinate spaceOAR + fiducial placement with Dr. Nunes, followed by  [place] [facility]   -radiation fractions pending if enrolling on , treat at [facility]    Thank you for the opportunity to participate in the care of this kind patient.    Milly Betancourt MD PhD  , Radiation Oncology

## 2025-01-06 ENCOUNTER — TELEPHONE (OUTPATIENT)
Dept: RADIATION ONCOLOGY | Facility: CLINIC | Age: 75
End: 2025-01-06
Payer: MEDICARE

## 2025-01-06 ENCOUNTER — SPECIALTY PHARMACY (OUTPATIENT)
Dept: PHARMACY | Facility: CLINIC | Age: 75
End: 2025-01-06

## 2025-01-06 DIAGNOSIS — C61 PROSTATE CANCER (MULTI): Primary | ICD-10-CM

## 2025-01-06 NOTE — TELEPHONE ENCOUNTER
Discussed PSMA PET demonstrating cN0M0.  He is interested to enroll on . He plans to come in tomorrow to sign consent. Plan for 18m of ADT.  He will get labs today during dialysis.    Milly Betancourt MD PhD  , Radiation Oncology

## 2025-01-07 ENCOUNTER — LAB (OUTPATIENT)
Dept: LAB | Facility: HOSPITAL | Age: 75
End: 2025-01-07
Payer: MEDICARE

## 2025-01-07 ENCOUNTER — HOSPITAL ENCOUNTER (OUTPATIENT)
Dept: RADIATION ONCOLOGY | Facility: HOSPITAL | Age: 75
Setting detail: RADIATION/ONCOLOGY SERIES
Discharge: HOME | End: 2025-01-07
Payer: MEDICARE

## 2025-01-07 VITALS
BODY MASS INDEX: 27.19 KG/M2 | RESPIRATION RATE: 18 BRPM | DIASTOLIC BLOOD PRESSURE: 64 MMHG | OXYGEN SATURATION: 98 % | TEMPERATURE: 96.6 F | HEART RATE: 60 BPM | WEIGHT: 163.4 LBS | SYSTOLIC BLOOD PRESSURE: 144 MMHG

## 2025-01-07 DIAGNOSIS — C61 PROSTATE CANCER (MULTI): ICD-10-CM

## 2025-01-07 DIAGNOSIS — C61 PROSTATE CANCER (MULTI): Primary | ICD-10-CM

## 2025-01-07 LAB
ALBUMIN SERPL BCP-MCNC: 4.5 G/DL (ref 3.4–5)
ALP SERPL-CCNC: 66 U/L (ref 33–136)
ALT SERPL W P-5'-P-CCNC: 14 U/L (ref 10–52)
ANION GAP SERPL CALC-SCNC: 14 MMOL/L (ref 10–20)
AST SERPL W P-5'-P-CCNC: 16 U/L (ref 9–39)
BASOPHILS # BLD AUTO: 0.01 X10*3/UL (ref 0–0.1)
BASOPHILS NFR BLD AUTO: 0.2 %
BILIRUB SERPL-MCNC: 0.6 MG/DL (ref 0–1.2)
BUN SERPL-MCNC: 23 MG/DL (ref 6–23)
CALCIUM SERPL-MCNC: 9.6 MG/DL (ref 8.6–10.3)
CHLORIDE SERPL-SCNC: 94 MMOL/L (ref 98–107)
CO2 SERPL-SCNC: 33 MMOL/L (ref 21–32)
CREAT SERPL-MCNC: 4.7 MG/DL (ref 0.5–1.3)
EGFRCR SERPLBLD CKD-EPI 2021: 12 ML/MIN/1.73M*2
EOSINOPHIL # BLD AUTO: 0.11 X10*3/UL (ref 0–0.4)
EOSINOPHIL NFR BLD AUTO: 2.3 %
ERYTHROCYTE [DISTWIDTH] IN BLOOD BY AUTOMATED COUNT: 14.5 % (ref 11.5–14.5)
GLUCOSE SERPL-MCNC: 91 MG/DL (ref 74–99)
HCT VFR BLD AUTO: 30.9 % (ref 41–52)
HGB BLD-MCNC: 10.8 G/DL (ref 13.5–17.5)
IMM GRANULOCYTES # BLD AUTO: 0.02 X10*3/UL (ref 0–0.5)
IMM GRANULOCYTES NFR BLD AUTO: 0.4 % (ref 0–0.9)
LYMPHOCYTES # BLD AUTO: 1.08 X10*3/UL (ref 0.8–3)
LYMPHOCYTES NFR BLD AUTO: 22.7 %
MCH RBC QN AUTO: 34.6 PG (ref 26–34)
MCHC RBC AUTO-ENTMCNC: 35 G/DL (ref 32–36)
MCV RBC AUTO: 99 FL (ref 80–100)
MONOCYTES # BLD AUTO: 0.48 X10*3/UL (ref 0.05–0.8)
MONOCYTES NFR BLD AUTO: 10.1 %
NEUTROPHILS # BLD AUTO: 3.05 X10*3/UL (ref 1.6–5.5)
NEUTROPHILS NFR BLD AUTO: 64.3 %
NRBC BLD-RTO: 0 /100 WBCS (ref 0–0)
PLATELET # BLD AUTO: 121 X10*3/UL (ref 150–450)
POTASSIUM SERPL-SCNC: 4.2 MMOL/L (ref 3.5–5.3)
PROT SERPL-MCNC: 6.5 G/DL (ref 6.4–8.2)
PSA SERPL-MCNC: 4.85 NG/ML
RBC # BLD AUTO: 3.12 X10*6/UL (ref 4.5–5.9)
SODIUM SERPL-SCNC: 137 MMOL/L (ref 136–145)
TESTOST SERPL-MCNC: 242 NG/DL (ref 240–1000)
WBC # BLD AUTO: 4.8 X10*3/UL (ref 4.4–11.3)

## 2025-01-07 PROCEDURE — 36415 COLL VENOUS BLD VENIPUNCTURE: CPT

## 2025-01-07 PROCEDURE — 84403 ASSAY OF TOTAL TESTOSTERONE: CPT

## 2025-01-07 PROCEDURE — 99212 OFFICE O/P EST SF 10 MIN: CPT | Performed by: STUDENT IN AN ORGANIZED HEALTH CARE EDUCATION/TRAINING PROGRAM

## 2025-01-07 PROCEDURE — 84153 ASSAY OF PSA TOTAL: CPT

## 2025-01-07 PROCEDURE — RXMED WILLOW AMBULATORY MEDICATION CHARGE

## 2025-01-07 PROCEDURE — 85025 COMPLETE CBC W/AUTO DIFF WBC: CPT

## 2025-01-07 PROCEDURE — 80053 COMPREHEN METABOLIC PANEL: CPT

## 2025-01-07 NOTE — PROGRESS NOTES
Staff Physician: Milly Betancourt MD PhD  Date of Service: 2025  Patient name: Salazar Jesus   MRN: [id number]    RADIATION ONCOLOGY FOLLOW UP NOTE    IDENTIFYING DATA:  DIAGNOSIS: Newly diagnosed, localized   Cancer Staging   Prostate cancer (Multi)  Staging form: Prostate, AJCC 8th Edition  - Clinical stage from 10/15/2024: cT3b, cN0, PSA: 5.7, Grade Group: 3 - Signed by Milly Betancourt MD PhD on 12/10/2024  Prognostic indicators: Decipher 0.87 (high)    DISEASE STATE: No prior treatment  DISEASE STATUS: Treatment pending work-up  Problem List Items Addressed This Visit       Prostate cancer (Multi) - Primary    Relevant Orders    Rad Onc Intent to Treat     He was last seen in Radiation Oncology on 12/10/2024 and returns today for routine follow-up.    Oncologic History:  2022 PSA 3.38     8/3/2023 PSA 4.4     2024 PSA 5.7     2024 MRI prostate without gadolinium noted a 25.9 g gland, PI-RADS 5 lesion involving right PZ, extending base to mid gland, extending into the right SV, broadly abutting capsule concerning for EPE.  No abnormally enlarged lymph nodes.     10/15/2024 systematic and targeted biopsy noted GG3 disease, left anterior with intraductal carcinoma,, 9/14 cores, 1/1 targeted core that was GG 2.  Decipher 0.87     12/10/2024 PSMA PET/CT noted uptake within the prostate, SUV 6.4, corresponding to the PI-RADS 5 lesion. No abnormal uptake in nodes or distant sites.    Interval History:  Today, he is accompanied by his wife to discuss .   Denies any changes to urinary or bowel function.     A 10 point review of systems was reviewed with pertinent positives and negatives noted in HPI. All other systems have been reviewed and are negative.    PERFORMANCE STATUS:  Karnofsky Performance Score/ECO, Normal activity with effort; some signs or symptoms of disease (ECOG equivalent 1)    PHYSICAL EXAMINATION:  /64   Pulse 60   Temp 35.9 °C (96.6 °F) (Skin)   Resp 18   Wt 74.1  kg (163 lb 6.4 oz)   SpO2 98%   BMI 27.19 kg/m²   Constitutional: well developed, no distress, alert & oriented, cooperative  Eyes: pupils equal round and reactive to light, extraocular movements intact  Respiratory: normal work of breathing    CTCAE (v5) ADVERSE EVENTS:  Toxicity Assessment          12/10/2024    10:00   Toxicity Assessment   Treatment Site Pelvis - male   Diarrhea Grade 0   Urinary Incontinence Grade 0   Erectile Dysfunction Grade 1   Urinary Frequency Grade 1       IPSS 12, nocturia x4, bother of 2   Urinary Retention Grade 2       on alfuzosin 10mg daily and doxazosin 1mg   Urinary Urgency Grade 1       DIAGNOSTIC REPORTS REVIEWED:  Imaging: All imaging was personally reviewed and interpreted in clinic. Findings as per interval history and EMR.  Laboratory/Pathology:  All pertinent labs and pathology were personally reviewed and interpreted in clinic. Findings as per interval history and EMR.  Lab Results   Component Value Date    PSA 5.7 (H) 08/21/2024    PSA 4.40 (H) 08/03/2023    PSA 3.38 06/08/2022    PSA 3.41 04/30/2021    PSASCREEN 4.85 (H) 01/07/2025     Lab Results   Component Value Date    TESTOSTERONE 242 01/07/2025     Lab Results   Component Value Date    BUN 23 01/07/2025    CREATININE 4.70 (H) 01/07/2025    EGFR 12 (L) 01/07/2025     01/07/2025    K 4.2 01/07/2025    CL 94 (L) 01/07/2025    CO2 33 (H) 01/07/2025    CALCIUM 9.6 01/07/2025    HGBA1C 4.4 08/21/2024        IMPRESSION:  74-year-old gentleman with newly diagnosed prostate adenocarcinoma, PSA 5.7, cT3b (by MRI), GG3 (9/14 cores+, 1/1 targeted  core+, intraductal carcinoma+, Decipher 0.87), cN0M0 by PSMA PET.     PLAN:  - signed  -labs today  -plan for 18m of ADT, undergoing prior auth for insurance for Orgovyx   -we will coordinate spaceOAR + fiducial placement with Dr. Nunes, followed by  CT-sim at [facility] [place], [place]   -radiation fractions pending if enrolling on , treat at [facility] [place] [place]     He knows to call with any questions  or concerns in the interim.    Milly Betancourt MD PhD  , Radiation Oncology

## 2025-01-08 ENCOUNTER — TELEPHONE (OUTPATIENT)
Dept: UROLOGY | Facility: CLINIC | Age: 75
End: 2025-01-08
Payer: MEDICARE

## 2025-01-09 ENCOUNTER — HOSPITAL ENCOUNTER (OUTPATIENT)
Facility: HOSPITAL | Age: 75
Setting detail: OUTPATIENT SURGERY
End: 2025-01-09
Attending: STUDENT IN AN ORGANIZED HEALTH CARE EDUCATION/TRAINING PROGRAM | Admitting: STUDENT IN AN ORGANIZED HEALTH CARE EDUCATION/TRAINING PROGRAM
Payer: MEDICARE

## 2025-01-09 DIAGNOSIS — C61 PROSTATE CANCER (MULTI): ICD-10-CM

## 2025-01-09 RX ORDER — CEFAZOLIN SODIUM 2 G/100ML
2 INJECTION, SOLUTION INTRAVENOUS ONCE
OUTPATIENT
Start: 2025-01-09 | End: 2025-01-09

## 2025-01-10 ENCOUNTER — SPECIALTY PHARMACY (OUTPATIENT)
Dept: PHARMACY | Facility: CLINIC | Age: 75
End: 2025-01-10

## 2025-01-13 ENCOUNTER — PHARMACY VISIT (OUTPATIENT)
Dept: PHARMACY | Facility: CLINIC | Age: 75
End: 2025-01-13
Payer: MEDICARE

## 2025-01-14 ENCOUNTER — SPECIALTY PHARMACY (OUTPATIENT)
Dept: PHARMACY | Facility: CLINIC | Age: 75
End: 2025-01-14

## 2025-01-14 ENCOUNTER — APPOINTMENT (OUTPATIENT)
Dept: UROLOGY | Facility: CLINIC | Age: 75
End: 2025-01-14
Payer: MEDICARE

## 2025-01-14 VITALS — BODY MASS INDEX: 27.12 KG/M2 | TEMPERATURE: 96.4 F | WEIGHT: 163 LBS

## 2025-01-14 DIAGNOSIS — N40.1 BPH ASSOCIATED WITH NOCTURIA: Primary | ICD-10-CM

## 2025-01-14 DIAGNOSIS — Z79.2 NEED FOR PROPHYLACTIC ANTIBIOTIC: ICD-10-CM

## 2025-01-14 DIAGNOSIS — R35.1 BPH ASSOCIATED WITH NOCTURIA: Primary | ICD-10-CM

## 2025-01-14 RX ORDER — CEPHALEXIN 250 MG/1
250 CAPSULE ORAL ONCE
Qty: 1 CAPSULE | Refills: 0 | Status: SHIPPED | OUTPATIENT
Start: 2025-01-14 | End: 2025-01-14

## 2025-01-14 ASSESSMENT — PAIN SCALES - GENERAL: PAINLEVEL_OUTOF10: 0-NO PAIN

## 2025-01-14 NOTE — PROGRESS NOTES
Subjective     This visit was completed via telemedicine. All issues as below were discussed and addressed but no physical exam was performed unless allowed by visual confirmation. If it was felt that the patient should be evaluated in clinic, then they were directed there. Patient verbally consented to visit.      Salazar Jesus is a 74 y.o. male with history of BPH with LUTS on Alfuzosin and elevated PSA, PI-RADS 5 lesion s/p prostate biopsy (10/15/24) with pathology prostatic adenocarcinoma, GG3 (Gab[place] score 4+3=7). He presents today for a follow up. Patient is scheduled to start radiation therapy and paceOAR + fiducial placement in February.     Patient has bothersome LUTS; mostly bothered by has nocturia every 1.5 to 2 hours, urinary frequency, urgency and weak stream. Patient reports he feels lightheadedness due to Alfuzosin. He is interested in an outlet procedure prior to radiation therapy. Denies any recent gross hematuria, fevers, chills, urinary retention, intractable flank or abdominal pain, nausea or vomiting.                Past Medical History:   Diagnosis Date    BPH (benign prostatic hyperplasia)     Chronic kidney disease     ESRD - Dialysis 3 x a week    Dental crown present     loose; on upper left incisor    ESRD (end stage renal disease) on dialysis (Multi)     Dialysis MWF    Gout     Hemodialysis status (CMS-[facility])     Lt arm fistula    HTN (hypertension)     Hyperlipidemia     Hypothyroidism     ALLISON on CPAP      Past Surgical History:   Procedure Laterality Date    KNEE CARTILAGE SURGERY Left     Knee surgery    ROTATOR CUFF REPAIR Right 2017    THROAT SURGERY      w/ broken jaw    WISDOM TOOTH EXTRACTION Bilateral      Family History   Problem Relation Name Age of Onset    Heart disease Mother      Stroke Father       Current Outpatient Medications   Medication Sig Dispense Refill    alfuzosin (Uroxatral) 10 mg 24 hr tablet Take 1 tablet (10 mg) by mouth once daily. 90 tablet 3     amLODIPine (Norvasc) 10 mg tablet Take 1 tablet (10 mg) by mouth once daily. 90 tablet 1    aspirin 81 mg EC tablet Take 1 tablet (81 mg) by mouth once daily.      doxazosin (Cardura) 1 mg tablet Take 1 tablet (1 mg) by mouth once daily at bedtime.      febuxostat (Uloric) 40 mg tablet Take 1 tablet (40 mg) by mouth once daily. 90 tablet 3    hydrocortisone 2.5 % cream once daily as needed for rash.      labetalol (Normodyne) 100 mg tablet Take 1.5 tablets (150 mg) by mouth 2 times a day.      levothyroxine (Synthroid, Levoxyl) 150 mcg tablet TAKE 1 TABLET (150 MCG) BY MOUTH ONCE DAILY IN THE MORNING. TAKE BEFORE MEALS. 90 tablet 0    relugolix (Orgovyx) 120 mg tablet Take 1 tablet (120 mg total) by mouth once daily.  If missed more than 7 days in a row, take 3 tabs before going back to 1 tab per day 30 tablet 16    relugolix (Orgovyx) 120 mg tablet Take 3 tablets on day 1, then 1 tablet a day onwards daily. If missed more than 7 days in a row, take 3 tabs before going back to 1 tab per day 30 tablet 0    rosuvastatin (Crestor) 40 mg tablet Take 1 tablet (40 mg) by mouth once daily. 90 tablet 3    sevelamer carbonate (Renvela) 800 mg tablet Take 1 tablet (800 mg) by mouth 3 times daily (morning, midday, late afternoon). Swallow tablet whole; do not crush, break, or chew.      sodium bicarbonate 650 mg tablet Take 1 tablet (650 mg) by mouth 2 times a day.      Vitamin D3 50 mcg (2,000 unit) capsule TAKE 1 CAPSULE BY MOUTH EVERY DAY 90 capsule 3     No current facility-administered medications for this visit.     Allergies   Allergen Reactions    Allopurinol Itching     Social History     Socioeconomic History    Marital status:      Spouse name: Not on file    Number of children: Not on file    Years of education: Not on file    Highest education level: Not on file   Occupational History    Not on file   Tobacco Use    Smoking status: Former     Current packs/day: 0.00     Types: Cigarettes     Quit date: 1992      Years since quittin.0    Smokeless tobacco: Never   Vaping Use    Vaping status: Never Used   Substance and Sexual Activity    Alcohol use: Yes     Comment: 2 drinks a day    Drug use: Never    Sexual activity: Defer   Other Topics Concern    Not on file   Social History Narrative    Not on file     Social Drivers of Health     Financial Resource Strain: Not on file   Food Insecurity: No Food Insecurity (12/10/2024)    Hunger Vital Sign     Worried About Running Out of Food in the Last Year: Never true     Ran Out of Food in the Last Year: Never true   Transportation Needs: Not on file   Physical Activity: Not on file   Stress: Not on file   Social Connections: Not on file   Intimate Partner Violence: Not on file   Housing Stability: Not on file       Review of Systems  Pertinent items are noted in HPI.    Objective       Lab Review  Lab Results   Component Value Date    WBC 4.8 2025    RBC 3.12 (L) 2025    HGB 10.8 (L) 2025    HCT 30.9 (L) 2025     (L) 2025      Lab Results   Component Value Date    BUN 23 2025    CREATININE 4.70 (H) 2025      Lab Results   Component Value Date    PSA 5.7 (H) 2024     PVR = 20ml   Uroflow study demonstrated voided volume of 81 and maximal flow rate of 5 ml/s.     Assessment/Plan   There are no diagnoses linked to this encounter.  BPH with LUTS - worsening  [place] 7 prostate cancer       We will proceed with cystoscopy in anticipation of an outlet procedure.     The risks of cystoscopy were discussed with the patient in great detail, including risk of hematuria, UTI and discomfort. Antibiotic will be prescribed to be taken 1 hour prior to the procedure. Patient agrees to proceed.     We will delay his radiation treatments until his LUT's are resolved.     All questions were answered to the patient's satisfaction. Patient agrees with the plan and wishes to proceed. Follow-up will be scheduled appropriately.     E&M  visit today is associated with current or anticipated ongoing medical care services related to a patient's single, serious condition or a complex condition.      Scribed for Dr. Singh by Nikki Wood. I , Dr Singh, have personally reviewed and agreed with the information entered by the Virtual Scribe.

## 2025-01-14 NOTE — PATIENT INSTRUCTIONS
Take antibiotic 1 hour prior to cystoscopy.  We will need a urine sample during that appointment, so please arrive with a full enough bladder to leave a sample.  There are no restrictions in medications, eating/drinking, and driving.      normal...

## 2025-01-14 NOTE — PROGRESS NOTES
[facility] Clinical Note  Initial Patient Education     Introduction  Salazar Jesus is a 74 y.o. male who is on the specialty pharmacy service for management of: Oncology Core.    Salazar Jesus is initiating the following therapy: Orgovyx 360mg on day 1, then 120mg daily thereafter.  Medication receipt date: 1/14/2025  Duration of therapy: Patient/Prescriber Specific- 18 months per chart note    The most recent encounter visit with the referring prescriber Milly Betancourt MD on 1/7/2025 was reviewed.  Pharmacy will continue to collaborate in the care of this patient with the referring prescriber.    Clinical Background  An initial assessment was conducted prior to first fill of the medication to determine the appropriateness of therapy given the patient's diagnosis, medication list, comorbidities, allergies, medical history, patient's ability to self administer medication, and therapeutic goals based on possible outcomes of therapy. Refer to initial assessment task completed on 1/7/2025.    Labs for clinical appropriateness that were reviewed include:   Oncology - CBC-diff:   Lab Results   Component Value Date    WBC 4.8 01/07/2025    RBC 3.12 (L) 01/07/2025    HGB 10.8 (L) 01/07/2025    HCT 30.9 (L) 01/07/2025    MCV 99 01/07/2025    MCHC 35.0 01/07/2025     (L) 01/07/2025    RDW 14.5 01/07/2025    NEUTOPHILPCT 64.3 01/07/2025    IGPCT 0.4 01/07/2025    LYMPHOPCT 22.7 01/07/2025    MONOPCT 10.1 01/07/2025    EOSPCT 2.3 01/07/2025    BASOPCT 0.2 01/07/2025    NEUTROABS 3.05 01/07/2025    LYMPHSABS 1.08 01/07/2025    MONOSABS 0.48 01/07/2025    EOSABS 0.11 01/07/2025    BASOSABS 0.01 01/07/2025   , CMP:   Lab Results   Component Value Date    GLUCOSE 91 01/07/2025     01/07/2025    K 4.2 01/07/2025    CL 94 (L) 01/07/2025    CO2 33 (H) 01/07/2025    ANIONGAP 14 01/07/2025    BUN 23 01/07/2025    CREATININE 4.70 (H) 01/07/2025    CALCIUM 9.6 01/07/2025    ALBUMIN 4.5  01/07/2025    ALKPHOS 66 01/07/2025    PROT 6.5 01/07/2025    AST 16 01/07/2025    BILITOT 0.6 01/07/2025    ALT 14 01/07/2025   , PSA: [unfilled], and Testosterone:   Lab Results   Component Value Date    TESTOSTERONE 242 01/07/2025       Education/Discussion  Salazar was contacted on 1/14/2025 at 1:29 PM for a pharmacy visit with encounter number [id number] from:   [facility]  [address] [place] [id number]  Dept: [phone]  Dept Fax: [phone]  Salazar consented to a/an Telephone visit, which was performed.    Medication Start Date (planned or actual): TBD--waiting for start date from provider  Education was conducted prior to start of therapy? Yes    Education discussed includes the following:  Patient Education  Counseled the Patient on the Following : Theraputic rationale and expected outcomes, Expected duration of therapy, Doses and administration, Adherence and missed doses, Possible side effects and management, Possible drug interactions, Lab monitoring and follow-up, Safe handling, storage, and disposal, Contraindications and precautions, Pharmacy contact information  Learner: Patient  Education Method: Explanation  Education Response: Verbalizes understanding  Additional details of the medication specific counseling are found within the linked patient education flowsheet.     The follow up timeline was discussed. Every person responds to and reacts to therapy differently. Patient should be assessed for efficacy and tolerability in approximately: 10-14 days    Provided education on goals and possible outcomes of therapy:  Adherence with therapy  Timely completion of appropriate labs  Timely and appropriate follow up with provider  Identify and address medication interactions with presciption medications, OTC medications and supplements  Optimize or maintain quality of life  Oncology: Prolong life/No disease progression  Manage side effects (ex:  nausea/vomiting, constipation, fatigue) in conjunction with care team    The importance of adherence was discussed and they were advised to take the medication as prescribed by their provider.     Impression/Plan  Review and Assessment   Reviewed During This Encounter: Allergies, Medications, Problem list  Medications Assessed for Appropriate Use, Dose, Route, Frequency, and Duration: Yes  Medication Reconciliation Completed: Yes  Drug Interactions Evaluated: Yes  Clinically Relevant Drug Interactions Identified: No    This patient has been identified as high risk due to Geriatric (over 65 years of age).  The following action was taken: Patient/caregiver encouraged to participate in patient management program.    QOL/Patient Satisfaction  Rate your quality of life on scale of 1-10:  (unable to assess)  Rate your satisfaction with  Specialty Pharmacy on scale of 1-10:  (unable to assess)    The  Specialty Pharmacy Welcome packet may be viewed here:  [email or web address]       Or by scanning QR code:      Provided contact information ([phone]) for [facility] and reviewed dispensing process, refill timeline and patient management follow up. Advised to contact the pharmacy if there are any adverse effects and/or changes to medication list, including prescriptions, OTC medications, herbal products, or supplements. Confirmed understanding of education conducted during assessment. All questions and concerns were addressed and patient was encouraged to reach out for additional questions or concerns.      Tabitha Perez, PharmD

## 2025-01-17 NOTE — PROGRESS NOTES
Subjective       Salazar Jesus is a 74 y.o. male with history of BPH with LUTS on Alfuzosin and elevated PSA, PI-RADS 5 lesion s/p prostate biopsy (10/15/24) with pathology prostatic adenocarcinoma, GG3 ([place] score 4+3=7). Patient is scheduled to start radiation therapy and paceOAR + fiducial placement in February. He presents today for cystoscopy in anticipation of an outlet procedure prior to starting radiation therapy.     Patient has bothersome LUTS; mostly bothered by has nocturia every 1.5 to 2 hours, urinary frequency, urgency and weak stream. Patient reports he feels lightheadedness due to Alfuzosin. Denies any recent gross hematuria, fevers, chills, urinary retention, intractable flank or abdominal pain, nausea or vomiting.                Past Medical History:   Diagnosis Date    BPH (benign prostatic hyperplasia)     Chronic kidney disease     ESRD - Dialysis 3 x a week    Dental crown present     loose; on upper left incisor    ESRD (end stage renal disease) on dialysis (Multi)     Dialysis MWF    Gout     Hemodialysis status (CMS-[facility])     Lt arm fistula    HTN (hypertension)     Hyperlipidemia     Hypothyroidism     ALLISON on CPAP      Past Surgical History:   Procedure Laterality Date    KNEE CARTILAGE SURGERY Left     Knee surgery    ROTATOR CUFF REPAIR Right 2017    THROAT SURGERY      w/ broken jaw    WISDOM TOOTH EXTRACTION Bilateral      Family History   Problem Relation Name Age of Onset    Heart disease Mother      Stroke Father       Current Outpatient Medications   Medication Sig Dispense Refill    alfuzosin (Uroxatral) 10 mg 24 hr tablet Take 1 tablet (10 mg) by mouth once daily. 90 tablet 3    amLODIPine (Norvasc) 10 mg tablet Take 1 tablet (10 mg) by mouth once daily. 90 tablet 1    aspirin 81 mg EC tablet Take 1 tablet (81 mg) by mouth once daily.      doxazosin (Cardura) 1 mg tablet Take 1 tablet (1 mg) by mouth once daily at bedtime.      febuxostat (Uloric) 40 mg tablet Take 1  tablet (40 mg) by mouth once daily. 90 tablet 3    hydrocortisone 2.5 % cream once daily as needed for rash.      labetalol (Normodyne) 100 mg tablet Take 1.5 tablets (150 mg) by mouth 2 times a day.      levothyroxine (Synthroid, Levoxyl) 150 mcg tablet TAKE 1 TABLET (150 MCG) BY MOUTH ONCE DAILY IN THE MORNING. TAKE BEFORE MEALS. 90 tablet 0    relugolix (Orgovyx) 120 mg tablet Take 1 tablet (120 mg total) by mouth once daily.  If missed more than 7 days in a row, take 3 tabs before going back to 1 tab per day 30 tablet 16    relugolix (Orgovyx) 120 mg tablet Take 3 tablets on day 1, then 1 tablet a day onwards daily. If missed more than 7 days in a row, take 3 tabs before going back to 1 tab per day 30 tablet 0    rosuvastatin (Crestor) 40 mg tablet Take 1 tablet (40 mg) by mouth once daily. 90 tablet 3    sevelamer carbonate (Renvela) 800 mg tablet Take 1 tablet (800 mg) by mouth 3 times daily (morning, midday, late afternoon). Swallow tablet whole; do not crush, break, or chew.      sodium bicarbonate 650 mg tablet Take 1 tablet (650 mg) by mouth 2 times a day.      Vitamin D3 50 mcg (2,000 unit) capsule TAKE 1 CAPSULE BY MOUTH EVERY DAY 90 capsule 3     No current facility-administered medications for this visit.     Allergies   Allergen Reactions    Allopurinol Itching     Social History     Socioeconomic History    Marital status:      Spouse name: Not on file    Number of children: Not on file    Years of education: Not on file    Highest education level: Not on file   Occupational History    Not on file   Tobacco Use    Smoking status: Former     Current packs/day: 0.00     Types: Cigarettes     Quit date:      Years since quittin.0    Smokeless tobacco: Never   Vaping Use    Vaping status: Never Used   Substance and Sexual Activity    Alcohol use: Yes     Comment: 2 drinks a day    Drug use: Never    Sexual activity: Defer   Other Topics Concern    Not on file   Social History Narrative     Not on file     Social Drivers of Health     Financial Resource Strain: Not on file   Food Insecurity: No Food Insecurity (12/10/2024)    Hunger Vital Sign     Worried About Running Out of Food in the Last Year: Never true     Ran Out of Food in the Last Year: Never true   Transportation Needs: Not on file   Physical Activity: Not on file   Stress: Not on file   Social Connections: Not on file   Intimate Partner Violence: Not on file   Housing Stability: Not on file       Review of Systems  Pertinent items are noted in HPI.    Objective     Cystoscopy performed:   Salazar GONZALEZ Edin identified using two (2) forms of identification.  Procedure: diagnostic cystourethroscopy  Indications for procedure: BPH with LUTS  Risks, benefits, and alternatives were discussed in detail.   Patient appears to understand and agrees to proceed.   Patient has signed the procedure consent form.     Cystoscopy findings:  Urethra: normal course and caliber, no evidence of stricture or lesion.  Prostate: non-obstructing. ***  Bladder: normal capacity, no trabeculations, no diverticulum, no stone, tumors or other lesions.  Post-cystoscopy: Patient tolerated procedure without complications.    [] Lateral hypertrophy, with complete channel occlusion.  [] Obstructing median lobe with intravesical protrusion.  [] Good sphincter coaptation.  [] Normal bladder.       Lab Review  Lab Results   Component Value Date    WBC 4.8 01/07/2025    RBC 3.12 (L) 01/07/2025    HGB 10.8 (L) 01/07/2025    HCT 30.9 (L) 01/07/2025     (L) 01/07/2025      Lab Results   Component Value Date    BUN 23 01/07/2025    CREATININE 4.70 (H) 01/07/2025      Lab Results   Component Value Date    PSA 5.7 (H) 08/21/2024         Assessment/Plan   There are no diagnoses linked to this encounter.  BPH with LUTS - worsening  [place] 7 prostate cancer         All questions were answered to the patient's satisfaction. Patient agrees with the plan and wishes to proceed.  Follow-up will be scheduled appropriately.     E&M visit today is associated with current or anticipated ongoing medical care services related to a patient's single, serious condition or a complex condition.      Scribed for Dr. Singh by Nikki Wood. I , Dr Singh, have personally reviewed and agreed with the information entered by the Virtual Scribe.

## 2025-01-21 ENCOUNTER — APPOINTMENT (OUTPATIENT)
Dept: UROLOGY | Facility: CLINIC | Age: 75
End: 2025-01-21
Payer: MEDICARE

## 2025-01-21 VITALS
HEIGHT: 65 IN | SYSTOLIC BLOOD PRESSURE: 139 MMHG | DIASTOLIC BLOOD PRESSURE: 67 MMHG | HEART RATE: 61 BPM | WEIGHT: 162.6 LBS | TEMPERATURE: 96.8 F | BODY MASS INDEX: 27.09 KG/M2

## 2025-01-21 DIAGNOSIS — N32.89 OTHER SPECIFIED DISORDERS OF BLADDER: ICD-10-CM

## 2025-01-21 DIAGNOSIS — R35.1 BPH ASSOCIATED WITH NOCTURIA: Primary | ICD-10-CM

## 2025-01-21 DIAGNOSIS — Z01.818 PREOP TESTING: ICD-10-CM

## 2025-01-21 DIAGNOSIS — N35.811 OTHER STRICTURE OF URETHRAL MEATUS IN MALE: ICD-10-CM

## 2025-01-21 DIAGNOSIS — N40.1 BPH ASSOCIATED WITH NOCTURIA: Primary | ICD-10-CM

## 2025-01-21 PROBLEM — N35.911 STRICTURE OF URETHRAL MEATUS IN MALE: Status: ACTIVE | Noted: 2025-01-21

## 2025-01-21 RX ORDER — CEFAZOLIN SODIUM 2 G/100ML
2 INJECTION, SOLUTION INTRAVENOUS ONCE
OUTPATIENT
Start: 2025-01-21 | End: 2025-01-21

## 2025-01-21 ASSESSMENT — PAIN SCALES - GENERAL: PAINLEVEL_OUTOF10: 0-NO PAIN

## 2025-01-26 DIAGNOSIS — I10 HYPERTENSION, UNSPECIFIED TYPE: ICD-10-CM

## 2025-01-27 RX ORDER — AMLODIPINE BESYLATE 10 MG/1
10 TABLET ORAL DAILY
Qty: 90 TABLET | Refills: 1 | Status: SHIPPED | OUTPATIENT
Start: 2025-01-27

## 2025-01-31 ENCOUNTER — PHARMACY VISIT (OUTPATIENT)
Dept: PHARMACY | Facility: CLINIC | Age: 75
End: 2025-01-31
Payer: MEDICARE

## 2025-01-31 ENCOUNTER — SPECIALTY PHARMACY (OUTPATIENT)
Dept: PHARMACY | Facility: CLINIC | Age: 75
End: 2025-01-31

## 2025-01-31 DIAGNOSIS — C61 PROSTATE CANCER (MULTI): ICD-10-CM

## 2025-01-31 PROCEDURE — RXMED WILLOW AMBULATORY MEDICATION CHARGE

## 2025-01-31 RX ORDER — RELUGOLIX 120 MG/1
120 TABLET, FILM COATED ORAL DAILY
Qty: 30 TABLET | Refills: 0 | Status: CANCELLED | OUTPATIENT
Start: 2025-01-31 | End: 2025-03-04

## 2025-02-03 ENCOUNTER — SPECIALTY PHARMACY (OUTPATIENT)
Dept: PHARMACY | Facility: CLINIC | Age: 75
End: 2025-02-03

## 2025-02-03 DIAGNOSIS — K21.9 GASTROESOPHAGEAL REFLUX DISEASE, UNSPECIFIED WHETHER ESOPHAGITIS PRESENT: ICD-10-CM

## 2025-02-03 DIAGNOSIS — E03.9 HYPOTHYROIDISM, UNSPECIFIED TYPE: ICD-10-CM

## 2025-02-04 RX ORDER — SODIUM BICARBONATE 650 MG/1
650 TABLET ORAL 2 TIMES DAILY
Qty: 180 TABLET | Refills: 0 | Status: SHIPPED | OUTPATIENT
Start: 2025-02-04 | End: 2025-05-05

## 2025-02-04 RX ORDER — LEVOTHYROXINE SODIUM 150 UG/1
150 TABLET ORAL
Qty: 90 TABLET | Refills: 0 | Status: SHIPPED | OUTPATIENT
Start: 2025-02-04

## 2025-02-06 ENCOUNTER — PRE-ADMISSION TESTING (OUTPATIENT)
Dept: PREADMISSION TESTING | Facility: HOSPITAL | Age: 75
End: 2025-02-06
Payer: MEDICARE

## 2025-02-06 VITALS
HEART RATE: 59 BPM | BODY MASS INDEX: 26.81 KG/M2 | SYSTOLIC BLOOD PRESSURE: 150 MMHG | DIASTOLIC BLOOD PRESSURE: 78 MMHG | HEIGHT: 65 IN | RESPIRATION RATE: 18 BRPM | TEMPERATURE: 97.9 F | WEIGHT: 160.9 LBS | OXYGEN SATURATION: 97 %

## 2025-02-06 DIAGNOSIS — Z01.818 PREOP TESTING: ICD-10-CM

## 2025-02-06 PROCEDURE — 99204 OFFICE O/P NEW MOD 45 MIN: CPT | Performed by: NURSE PRACTITIONER

## 2025-02-06 ASSESSMENT — DUKE ACTIVITY SCORE INDEX (DASI)
CAN YOU DO HEAVY WORK AROUND THE HOUSE LIKE SCRUBBING FLOORS OR LIFTING AND MOVING HEAVY FURNITURE: YES
CAN YOU DO MODERATE WORK AROUND THE HOUSE LIKE VACUUMING, SWEEPING FLOORS OR CARRYING GROCERIES: YES
DASI METS SCORE: 8.2
CAN YOU DO LIGHT WORK AROUND THE HOUSE LIKE DUSTING OR WASHING DISHES: YES
CAN YOU CLIMB A FLIGHT OF STAIRS OR WALK UP A HILL: YES
TOTAL_SCORE: 44.7
CAN YOU DO YARD WORK LIKE RAKING LEAVES, WEEDING OR PUSHING A MOWER: YES
CAN YOU PARTICIPATE IN MODERATE RECREATIONAL ACTIVITIES LIKE GOLF, BOWLING, DANCING, DOUBLES TENNIS OR THROWING A BASEBALL OR FOOTBALL: NO
CAN YOU WALK A BLOCK OR TWO ON LEVEL GROUND: YES
CAN YOU WALK INDOORS, SUCH AS AROUND YOUR HOUSE: YES
CAN YOU HAVE SEXUAL RELATIONS: YES
CAN YOU RUN A SHORT DISTANCE: YES
CAN YOU TAKE CARE OF YOURSELF (EAT, DRESS, BATHE, OR USE TOILET): YES
CAN YOU PARTICIPATE IN STRENOUS SPORTS LIKE SWIMMING, SINGLES TENNIS, FOOTBALL, BASKETBALL, OR SKIING: NO

## 2025-02-06 ASSESSMENT — PAIN SCALES - GENERAL: PAINLEVEL_OUTOF10: 0 - NO PAIN

## 2025-02-06 ASSESSMENT — PAIN - FUNCTIONAL ASSESSMENT: PAIN_FUNCTIONAL_ASSESSMENT: 0-10

## 2025-02-06 NOTE — H&P (VIEW-ONLY)
CPM/PAT Evaluation       Name: Salazar Jesus (Salazar Jesus)  /Age: 1950/74 y.o.     In-Person       Chief Complaint: BPH associated with nocturia     HPI  Patient is an alert and oriented 74 year old female scheduled for a  Greenlight Photoselective Vaporization of Prostate on 25 with Dr. Singh for  BPH associated with nocturia . PMHX includes HTN, Hypothyroidism, gout, BPH, ESRD. Presents to [facility] PAT today for perioperative risk stratification and optimization.     Past Medical History:   Diagnosis Date    BPH (benign prostatic hyperplasia)     Chronic kidney disease     ESRD - Dialysis 3 x a week    Dental crown present     loose; on upper left incisor    ESRD (end stage renal disease) on dialysis (Multi)     Dialysis MWF    Gout     Hemodialysis status (CMS-[facility])     Lt arm fistula    HTN (hypertension)     Hyperlipidemia     Hypothyroidism     ALLISON on CPAP        Past Surgical History:   Procedure Laterality Date    KNEE CARTILAGE SURGERY Left     Knee surgery    ROTATOR CUFF REPAIR Right 2017    THROAT SURGERY      w/ broken jaw    WISDOM TOOTH EXTRACTION Bilateral        Patient Sexual activity questions deferred to the physician.    Family History   Problem Relation Name Age of Onset    Heart disease Mother      Stroke Father         Allergies   Allergen Reactions    Allopurinol Itching       Prior to Admission medications    Medication Sig Start Date End Date Taking? Authorizing Provider   alfuzosin (Uroxatral) 10 mg 24 hr tablet Take 1 tablet (10 mg) by mouth once daily. 24  Rosemary Singh MD   amLODIPine (Norvasc) 10 mg tablet TAKE 1 TABLET BY MOUTH EVERY DAY 25   Christopher D'Amico,    aspirin 81 mg EC tablet Take 1 tablet (81 mg) by mouth once daily.    Historical Provider, MD   doxazosin (Cardura) 1 mg tablet Take 1 tablet (1 mg) by mouth once daily at bedtime.    Historical Provider, MD   febuxostat (Uloric) 40 mg tablet Take 1 tablet (40 mg) by mouth once  "daily. 2/15/24 2/14/25  Christopher D'Amico, DO   hydrocortisone 2.5 % cream once daily as needed for rash. 8/25/21   Historical Provider, MD   labetalol (Normodyne) 100 mg tablet Take 1.5 tablets (150 mg) by mouth 2 times a day.    Historical Provider, MD   levothyroxine (Synthroid, Levoxyl) 150 mcg tablet Take 1 tablet (150 mcg) by mouth once daily in the morning. Take before meals. 2/4/25   Christopher D'Amico, DO   relugolix (Orgovyx) 120 mg tablet Take 1 tablet (120 mg total) by mouth once daily.  If more than 7 days in a row are missed, take 3 tablets by mouth once before going back to 1 tablet per day 1/6/25 5/31/26  Milly Betancourt MD PhD   rosuvastatin (Crestor) 40 mg tablet Take 1 tablet (40 mg) by mouth once daily. 3/24/23 11/4/24  Christopher D'Amico, DO   sevelamer carbonate (Renvela) 800 mg tablet Take 1 tablet (800 mg) by mouth 3 times daily (morning, midday, late afternoon). Swallow tablet whole; do not crush, break, or chew.    Historical Provider, MD   sodium bicarbonate 650 mg tablet Take 1 tablet (650 mg) by mouth 2 times a day. 2/4/25 5/5/25  Christopher D'Amico, DO   Vitamin D3 50 mcg (2,000 unit) capsule TAKE 1 CAPSULE BY MOUTH EVERY DAY 1/30/24   Christopher D'Amico, DO   levothyroxine (Synthroid, Levoxyl) 150 mcg tablet TAKE 1 TABLET (150 MCG) BY MOUTH ONCE DAILY IN THE MORNING. TAKE BEFORE MEALS. 10/6/24 2/3/25  Christopher D'Amico, DO   relugolix (Orgovyx) 120 mg tablet Take 3 tablets on day 1, then 1 tablet a day onwards daily. If missed more than 7 days in a row, take 3 tabs before going back to 1 tab per day 1/6/25 1/31/25  Milly Betancourt MD PhD   sodium bicarbonate 650 mg tablet Take 1 tablet (650 mg) by mouth 2 times a day.  2/3/25  Historical Provider, MD        Visit Vitals  /78   Pulse 59   Temp 36.6 °C (97.9 °F) (Temporal)   Resp 18   Ht 1.651 m (5' 5\")   Wt 73 kg (160 lb 14.4 oz)   SpO2 97%   BMI 26.78 kg/m²   Smoking Status Former   BSA 1.83 m²       Review of Systems "   Constitutional: Negative for chills, decreased appetite, diaphoresis, fever and malaise/fatigue.   Eyes:  Negative for blurred vision and double vision.   Cardiovascular:  Negative for chest pain, claudication, cyanosis, dyspnea on exertion, irregular heartbeat, leg swelling, near-syncope and palpitations.   Respiratory:  Negative for cough, hemoptysis, shortness of breath and wheezing.    Endocrine: Negative for cold intolerance, heat intolerance, polydipsia, polyphagia and polyuria.   Gastrointestinal:  Negative for abdominal pain, constipation, diarrhea, dysphagia, nausea and vomiting.   Genitourinary:  Negative for bladder incontinence, dysuria, hematuria, incomplete emptying, nocturia, frequency, pelvic pain and urgency.   Neurological:  Negative for headaches, light-headedness, paresthesias, sensory change and weakness.   Psychiatric/Behavioral:  Negative for altered mental status.    Musculoskeletal: Negative for myalgias, arthralgias     Vitals and nursing note reviewed.     Physical exam  Constitutional:       Appearance: Normal appearance. He is Overweight.   HENT:      Head: Normocephalic and atraumatic.      Mouth/Throat:      Mouth: Mucous membranes are moist.      Pharynx: Oropharynx is clear.   Eyes:      Extraocular Movements: Extraocular movements intact.      Conjunctiva/sclera: Conjunctivae normal.      Pupils: Pupils are equal, round, and reactive to light.   Cardiovascular:      PMI at left midclavicular line. Normal rate. Regular rhythm. Normal S1. Normal S2.       Murmurs: There is no murmur.      No gallop.  No click. No rub.       No audible carotid bruit     No lower extremity edema on exam  Pulmonary:      Effort: Pulmonary effort is normal.      Breath sounds: Normal breath sounds.   Abdominal:      General: Abdomen is flat. Bowel sounds are normal.      Palpations: Abdomen is soft and non-tender  Musculoskeletal:      Cervical back: Normal range of motion and neck supple.   Skin:      General: Skin is warm and dry.      Capillary Refill: Capillary refill takes less than 2 seconds.   Neurological:      General: No focal deficit present.      Mental Status: He is alert and oriented to person, place, and time. Mental status is at baseline.   Psychiatric:         Mood and Affect: Mood normal.         Behavior: Behavior normal.         Thought Content: Thought content normal.         Judgment: Judgment normal.     Vitals and nursing note reviewed. Physical exam within normal limits.       DASI Risk Score      Flowsheet Row Pre-Admission Testing from 2/6/2025 in [facility] Pre-Admission Testing from 10/3/2024 in [facility]   Can you take care of yourself (eat, dress, bathe, or use toilet)?  2.75 filed at 02/06/2025 1202 2.75 filed at 10/03/2024 1105   Can you walk indoors, such as around your house? 1.75 filed at 02/06/2025 1202 1.75 filed at 10/03/2024 1105   Can you walk a block or two on level ground?  2.75 filed at 02/06/2025 1202 2.75 filed at 10/03/2024 1105   Can you climb a flight of stairs or walk up a hill? 5.5 filed at 02/06/2025 1202 5.5 filed at 10/03/2024 1105   Can you run a short distance? 8 filed at 02/06/2025 1202 8 filed at 10/03/2024 1105   Can you do light work around the house like dusting or washing dishes? 2.7 filed at 02/06/2025 1202 2.7 filed at 10/03/2024 1105   Can you do moderate work around the house like vacuuming, sweeping floors or carrying groceries? 3.5 filed at 02/06/2025 1202 3.5 filed at 10/03/2024 1105   Can you do heavy work around the house like scrubbing floors or lifting and moving heavy furniture?  8 filed at 02/06/2025 1202 8 filed at 10/03/2024 1105   Can you do yard work like raking leaves, weeding or pushing a mower? 4.5 filed at 02/06/2025 1202 4.5 filed at 10/03/2024 1105   Can you have sexual relations? 5.25 filed at 02/06/2025 1202 5.25 filed at 10/03/2024 1105   Can you participate in moderate recreational activities  like golf, bowling, dancing, doubles tennis or throwing a baseball or football? 0 filed at 02/06/2025 1202 6 filed at 10/03/2024 1105   Can you participate in strenous sports like swimming, singles tennis, football, basketball, or skiing? 0 filed at 02/06/2025 1202 0 filed at 10/03/2024 1105   DASI SCORE 44.7 filed at 02/06/2025 1202 50.7 filed at 10/03/2024 1105   METS Score (Will be calculated only when all the questions are answered) 8.2 filed at 02/06/2025 1202 9 filed at 10/03/2024 1105          Caprini DVT Assessment      Flowsheet Row Pre-Admission Testing from 2/6/2025 in [facility] Pre-Admission Testing from 10/3/2024 in [facility]   DVT Score (IF A SCORE IS NOT CALCULATING, MUST SELECT A BMI TO COMPLETE) 8 filed at 02/06/2025 1200 5 filed at 10/03/2024 1104   Medical Factors Present cancer, chemotherapy, or previous malignancy filed at 02/06/2025 1200 --   Surgical Factors Major surgery planned, including arthroscopic and laproscopic (1-2 hours) filed at 02/06/2025 1200 Minor surgery planned filed at 10/03/2024 1104   BMI (BMI MUST BE CHOSEN) 30 or less filed at 02/06/2025 1200 30 or less filed at 10/03/2024 1104          Modified Frailty Index      Flowsheet Row Pre-Admission Testing from 10/3/2024 in [facility]   Non-independent functional status (problems with dressing, bathing, personal grooming, or cooking) 0 filed at 10/03/2024 1106   History of diabetes mellitus  0 filed at 10/03/2024 1106   History of COPD 0 filed at 10/03/2024 1106   History of CHF No filed at 10/03/2024 1106   History of MI 0 filed at 10/03/2024 1106   History of Percutaneous Coronary Intervention, Cardiac Surgery, or Angina No filed at 10/03/2024 1106   Hypertension requiring the use of medication  0.0909 filed at 10/03/2024 1106   Peripheral vascular disease 0 filed at 10/03/2024 1106   Impaired sensorium (cognitive impairement or loss, clouding, or delirium) 0 filed at  10/03/2024 1106   TIA or CVA withouy residual deficit 0 filed at 10/03/2024 1106   Cerebrovascular accident with deficit 0 filed at 10/03/2024 1106   Modified Frailty Index Calculator .0909 filed at 10/03/2024 1106          CHADS2 Stroke Risk  Current as of 9 minutes ago        N/A 3 to 100%: High Risk   2 to < 3%: Medium Risk   0 to < 2%: Low Risk     Last Change: N/A          This score determines the patient's risk of having a stroke if the patient has atrial fibrillation.        This score is not applicable to this patient. Components are not calculated.          Revised Cardiac Risk Index      Flowsheet Row Pre-Admission Testing from 2/6/2025 in [facility] Pre-Admission Testing from 10/3/2024 in [facility]   High-Risk Surgery (Intraperitoneal, Intrathoracic,Suprainguinal vascular) 0 filed at 02/06/2025 1203 0 filed at 10/03/2024 1106   History of ischemic heart disease (History of MI, History of positive exercuse test, Current chest paint considered due to myocardial ischemia, Use of nitrate therapy, ECG with pathological Q Waves) 0 filed at 02/06/2025 1203 0 filed at 10/03/2024 1106   History of congestive heart failure (pulmonary edemia, bilateral rales or S3 gallop, Paroxysmal nocturnal dyspnea, CXR showing pulmonary vascular redistribution) 0 filed at 02/06/2025 1203 0 filed at 10/03/2024 1106   History of cerebrovascular disease (Prior TIA or stroke) 0 filed at 02/06/2025 1203 0 filed at 10/03/2024 1106   Pre-operative insulin treatment 0 filed at 02/06/2025 1203 0 filed at 10/03/2024 1106   Pre-operative creatinine>2 mg/dl 1 filed at 02/06/2025 1203 1 filed at 10/03/2024 1106   Revised Cardiac Risk Calculator 1 filed at 02/06/2025 1203 1 filed at 10/03/2024 1106          Apfel Simplified Score      Flowsheet Row Pre-Admission Testing from 10/3/2024 in [facility]   Smoking status 1 filed at 10/03/2024 1106   History of motion sickness or PONV  0 filed at  10/03/2024 1106   Use of postoperative opioids 0 filed at 10/03/2024 1106   Gender - Female 0=No filed at 10/03/2024 1106   Apfel Simplified Score Calculator 1 filed at 10/03/2024 1106          Risk Analysis Index Results This Encounter    No data found in the last 10 encounters.       Stop Bang Score      Flowsheet Row Pre-Admission Testing from 2/6/2025 in [facility] Pre-Admission Testing from 10/3/2024 in [facility]   Do you snore loudly? 1 filed at 02/06/2025 1045 1 filed at 10/03/2024 1031   Do you often feel tired or fatigued after your sleep? 1 filed at 02/06/2025 1045 0 filed at 10/03/2024 1031   Has anyone ever observed you stop breathing in your sleep? 1 filed at 02/06/2025 1045 1 filed at 10/03/2024 1031   Do you have or are you being treated for high blood pressure? 1 filed at 02/06/2025 1045 1 filed at 10/03/2024 1031   Recent BMI (Calculated) 26.8 filed at 02/06/2025 1045 26.9 filed at 10/03/2024 1031   Is BMI greater than 35 kg/m2? 0=No filed at 02/06/2025 1045 0=No filed at 10/03/2024 1031   Age older than 50 years old? 1=Yes filed at 02/06/2025 1045 1=Yes filed at 10/03/2024 1031   Is your neck circumference greater than 17 inches (Male) or 16 inches (Female)? 1 filed at 02/06/2025 1045 1 filed at 10/03/2024 1031   Gender - Male 1=Yes filed at 02/06/2025 1045 1=Yes filed at 10/03/2024 1031   STOP-BANG Total Score 7 filed at 02/06/2025 1045 6 filed at 10/03/2024 1031          Prodigy: High Risk  Total Score: 20              Prodigy Age Score      Prodigy Gender Score          ARISCAT Score for Postoperative Pulmonary Complications      Flowsheet Row Pre-Admission Testing from 2/6/2025 in [facility]   Age Calculated Score 3 filed at 02/06/2025 1203   Preoperative SpO2 0 filed at 02/06/2025 1203   Respiratory infection in the last month Either upper or lower (i.e., URI, bronchitis, pneumonia), with fever and antibiotic treatment 0 filed at 02/06/2025  1203   Preoperative anemia (Hgb less than 10 g/dl) 0 filed at 02/06/2025 1203   Surgical incision  0 filed at 02/06/2025 1203   Duration of surgery  0 filed at 02/06/2025 1203   Emergency Procedure  0 filed at 02/06/2025 1203   ARISCAT Total Score  3 filed at 02/06/2025 1203          Yoanna Perioperative Risk for Myocardial Infarction or Cardiac Arrest (KIMBRELEY)    No data to display         Assessment & Plan:    Neuro:  No diagnosis or significant findings on chart review or clinical presentation and evaluation.     HEENT/Airway:  ALLISON on CPAP  STOP-BANG Score- 7 points high risk for ALLISON    Mallampati::  II    TM distance::  >3 FB    Neck ROM::  Full  Dentures-reports upper right bridge  Crowns-reports upper left  Implants-denies    Cardiovascular:  HTN, HLD (labetalol, amlodipine, eplerenone, rosuvastatin)   METS: 8.2  RCRI: 1 point, 6.0% risk for postoperative MACE   KIMBERLEY: 0.5% risk for postoperative MACE  EKG -normal sinus rhythm 10/1/24    Pulmonary:  No diagnosis or significant findings on chart review or clinical presentation and evaluation.   ARISCAT: <26 points, 1.6% risk of in-hospital postoperative pulmonary complication  PRODIGY: Low risk for opioid induced respiratory depression  Smoking History-He has never smoked.  Deep breathing handout given    Renal/Urinary:   ESRD dialysis MWF follows with nephrology has an AV fistula left arm   BPH (alfuzosin)   Prostate cancer  the patient is at increased risk of perioperative renal complications secondary to HTN. Preventative measures include BP monitoring, medication compliance, and hydration management.   CMP-Pending  Creatinine-  GFR-    Endocrine:  No diagnosis or significant findings on chart review or clinical presentation and evaluation.   [id number]    Hematologic/Immunology:  No diagnosis or significant findings on chart review or clinical presentation and evaluation.  The patient is not a Jehovah’s witness and will accept blood and blood products if  medically indicated.   History of previous blood transfusions No  CBC-Pending  HGB-Pending  Caprini Score 8, patient at High for postoperative DVT. Pt supplied education/VTE handout  Anticoagulation use: Yes     Gastrointestinal:   Hypothyroidism (levothyroxine)   Recreational drug use: none  Alcohol use none    Infectious disease:   No diagnosis or significant findings on chart review or clinical presentation and evaluation.     Musculoskeletal:   No diagnosis or significant findings on chart review or clinical presentation and evaluation.   JHFRAT score-6 points. moderate risk for falls    Anesthesia:  ASA 3 - Patient with moderate systemic disease with functional limitations  Anticipated anesthesia-general  History of General anesthesia- yes  Complications- No anesthesia complications  No family history of anesthesia complications    Abnormalities noted on PAT evaluation: No    Labs & Imaging ordered:  Labs in by Francisco   Nickel/metal allergy-negative  Shellfish allergy-negative    Discussed with patient medication instructions, NPO guidelines, and any questions or concerns.   Pt cleared by cardiology, to stop asa 7 days before surgery    time spent 45 minutes

## 2025-02-06 NOTE — PREPROCEDURE INSTRUCTIONS
Medication List            Accurate as of February 6, 2025 10:30 AM. Always use your most recent med list.                alfuzosin 10 mg 24 hr tablet  Commonly known as: Uroxatral  Take 1 tablet (10 mg) by mouth once daily.  Medication Adjustments for Surgery: Take/Use as prescribed     amLODIPine 10 mg tablet  Commonly known as: Norvasc  TAKE 1 TABLET BY MOUTH EVERY DAY  Medication Adjustments for Surgery: Take last dose 1 day (24 hours) before surgery     aspirin 81 mg EC tablet  Additional Medication Adjustments for Surgery: Other (Comment)     doxazosin 1 mg tablet  Commonly known as: Cardura  Medication Adjustments for Surgery: Take/Use as prescribed     febuxostat 40 mg tablet  Commonly known as: Uloric  Take 1 tablet (40 mg) by mouth once daily.  Medication Adjustments for Surgery: Take/Use as prescribed     hydrocortisone 2.5 % cream  Medication Adjustments for Surgery: Do Not take on the morning of surgery     labetalol 100 mg tablet  Commonly known as: Normodyne  Medication Adjustments for Surgery: Take/Use as prescribed     levothyroxine 150 mcg tablet  Commonly known as: Synthroid, Levoxyl  Take 1 tablet (150 mcg) by mouth once daily in the morning. Take before meals.  Medication Adjustments for Surgery: Take/Use as prescribed     Orgovyx 120 mg tablet  Generic drug: relugolix  Take 1 tablet (120 mg total) by mouth once daily.  If more than 7 days in a row are missed, take 3 tablets by mouth once before going back to 1 tablet per day  Medication Adjustments for Surgery: Take/Use as prescribed     rosuvastatin 40 mg tablet  Commonly known as: Crestor  Take 1 tablet (40 mg) by mouth once daily.  Medication Adjustments for Surgery: Take/Use as prescribed     sevelamer carbonate 800 mg tablet  Commonly known as: Renvela  Medication Adjustments for Surgery: Take/Use as prescribed     sodium bicarbonate 650 mg tablet  Take 1 tablet (650 mg) by mouth 2 times a day.  Medication Adjustments for Surgery:  Take/Use as prescribed     Vitamin D3 50 mcg (2,000 unit) capsule  Generic drug: cholecalciferol  TAKE 1 CAPSULE BY MOUTH EVERY DAY  Additional Medication Adjustments for Surgery: Take last dose 7 days before surgery            NPO Instructions:     Do not eat any food or drink liquid after midnight the night before your surgery/procedure.  Additional Instructions:      Seven/Six Days before Surgery:  Review your medication instructions, stop indicated medications  Five Days before Surgery:  Review your medication instructions, stop indicated medications  Three Days before Surgery:  Review your medication instructions, stop indicated medications  The Day before Surgery:  No smoking or alcohol use 24 hours before surgery  Review your medication instructions, stop indicated medications  You will be contacted regarding the time of your arrival to facility and surgery time  Do not eat any food after Midnight  Day of Surgery:  Review your medication instructions, take indicated medications  If you have diabetes, please check your fasting blood sugar upon awakening.  If fasting blood sugar is <80 mg/dl, drink 100 ml of apple juice, time limit of 2 hours before  Wear  comfortable loose fitting clothing  Do not use moisturizers, creams, lotions or perfume  All jewelry and valuables should be left at home     CONTACT SURGEON'S OFFICE IF YOU DEVELOP:  * Fever = 100.4 F   * New respiratory symptoms (e.g. cough, shortness of breath, respiratory distress, sore throat)  * Recent loss of taste or smell  *Flu like symptoms such as headache, fatigue or gastrointestinal symptoms  * You develop any open sores, shingles, burning or painful urination   AND/OR:  * You no longer wish to have the surgery.  * Any other personal circumstances change that may lead to the need to cancel or defer this surgery.  *You were admitted to any hospital within one week of your planned procedure.     SMOKING:  *Quitting smoking can make a huge  difference to your health and recovery from surgery.    *If you need help with quitting, call 1-601-QUIT-NOW.     THE DAY BEFORE SURGERY:  *Do not eat any food after midnight the night before your surgery.   SURGICAL TIME:  *You will be contacted between 2 p.m. and 3 p.m. the business day before your surgery with your arrival time.  *If you haven't received a call by 3pm, call [phone]  *Scheduled surgery times may change and you will be notified if this occurs-check your personal voicemail for any updates.     ON THE MORNING OF SURGERY:  *Wear comfortable, loose fitting clothing.   *Do not use moisturizers, creams, lotions or perfume.  *All jewelry and valuables should be left at home.  *Prosthetic devices such as contact lenses, hearing aids, dentures, eyelash extensions, hairpins and body piercing must be removed before surgery.     BRING WITH YOU:  *Photo ID and insurance card  *Current list of medications and allergies  *Pacemaker/Defibrillator/Heart stent cards  *CPAP machine and mask  *Slings/splints/crutches  *Copy of your complete Advanced Directive/DHPOA-if applicable  *Neurostimulator implant remote     PARKING AND ARRIVAL:  *Check in at the Main Entrance desk and let them know you are here for surgery.     IF YOU ARE HAVING OUTPATIENT/SAME DAY SURGERY:  *A responsible adult MUST accompany you at the time of discharge and stay with you for 24 hours after your surgery.  *You may NOT drive yourself home after surgery.  *You may use a taxi or ride sharing service ([employer], Uber) to return home ONLY if you are accompanied by a friend or family member.  *Instructions for resuming your medications will be provided by your surgeon.     Thank you for coming to Pre Admission testing.      If I have prescribed medication please don't forget to  at your pharmacy.      Any questions about today's visit call [phone] and leave a message in the general mailbox.     Patient instructed to ambulate as soon  as possible postoperatively to decrease thromboembolic risk.     Rachel Jenkins APRN-CNP     Thank you for visiting [facility].  If you have any changes to your health condition, please call the surgeons office to alert them and give them details of your symptoms.        Preoperative Fasting Guidelines     Why must I stop eating and drinking near surgery time?  With sedation, food or liquid in your stomach can enter your lungs causing serious complications  Increases nausea and vomiting     When do I need to stop eating and drinking before my surgery?  Do not eat any food after midnight the night before your surgery/procedure.        Additional Instructions:      The Day before Surgery:  -Review your medication instructions, stop indicated medications  -You will be contacted in the evening regarding the time of your arrival to facility and surgery time     Day of Surgery:  -Review your medication instructions, take indicated medications  -Wear comfortable loose fitting clothing  -Do not use moisturizers, creams, lotions or perfume  -All jewelry and valuables should be left at home                   Preoperative Brain Exercises     What are brain exercises?  A brain exercise is any activity that engages your thinking (cognitive) skills.     What types of activities are considered brain exercises?  Jigsaw puzzles, crossword puzzles, word jumble, memory games, word search, and many more.  Many can be found free online or on your phone via a mobile charlette.     Why should I do brain exercises before my surgery?  More recent research has shown brain exercise before surgery can lower the risk of postoperative delirium (confusion) which can be especially important for older adults.  Patients who did brain exercises for 5 to 10 minutes/day the days before surgery, cut their risk of postoperative delirium in half up to 1 week after surgery.                         [facility]      Preoperative Deep Breathing Exercises     Why it is important to do deep breathing exercises before my surgery?  Deep breathing exercises strengthen your breathing muscles.  This helps you to recover after your surgery and decreases the chance of breathing complications.        How are the deep breathing exercises done?  Sit straight with your back supported.  Breathe in deeply and slowly through your nose. Your lower rib cage should expand and your abdomen may move forward.  Hold that breath for 3 to 5 seconds.  Breathe out through pursed lips, slowly and completely.  Rest and repeat 10 times every hour while awake.  Rest longer if you become dizzy or lightheaded.                      [facility]     Preoperative Deep Breathing Exercises     Why it is important to do deep breathing exercises before my surgery?  Deep breathing exercises strengthen your breathing muscles.  This helps you to recover after your surgery and decreases the chance of breathing complications.        How are the deep breathing exercises done?  Sit straight with your back supported.  Breathe in deeply and slowly through your nose. Your lower rib cage should expand and your abdomen may move forward.  Hold that breath for 3 to 5 seconds.  Breathe out through pursed lips, slowly and completely.  Rest and repeat 10 times every hour while awake.  Rest longer if you become dizzy or lightheaded.        Patient Information: Incentive Spirometer  What is an incentive spirometer?  An incentive spirometer is a device used before and after surgery to “exercise” your lungs.  It helps you to take deeper breaths to expand your lungs.  Below is an example of a basic incentive spirometer.  The device you receive may differ slightly but they all function the same.    Why do I need to use an incentive spirometer?  Using your incentive spirometer prepares your lungs for surgery and helps prevent lung problems after surgery.  How do I  use my incentive spirometer?  When you're using your incentive spirometer, make sure to breathe through your mouth. If you breathe through your nose, the incentive spirometer won't work properly. You can hold your nose if you have trouble.  If you feel dizzy at any time, stop and rest. Try again at a later time.  Follow the steps below:  Set up your incentive spirometer, expand the flexible tubing and connect to the outlet.  Sit upright in a chair or bed. Hold the incentive spirometer at eye level.   Put the mouthpiece in your mouth and close your lips tightly around it. Slowly breathe out (exhale) completely.  Breathe in (inhale) slowly through your mouth as deeply as you can. As you take a breath, you will see the piston rise inside the large column. While the piston rises, the indicator should move upwards. It should stay in between the 2 arrows (see Figure).  Try to get the piston as high as you can, while keeping the indicator between the arrows.   If the indicator doesn't stay between the arrows, you're breathing either too fast or too slow.  When you get it as high as you can, hold your breath for 10 seconds, or as long as possible. While you're holding your breath, the piston will slowly fall to the base of the spirometer.  Once the piston reaches the bottom of the spirometer, breathe out slowly through your mouth. Rest for a few seconds.  Repeat 10 times. Try to get the piston to the same level with each breath.  Repeat every hour while awake  You can carefully clean the outside of the mouthpiece with an alcohol wipe or soap and water.       Patient and Family Education             Ways You Can Help Prevent Blood Clots                    This handout explains some simple things you can do to help prevent blood clots.      Blood clots are blockages that can form in the body's veins. When a blood clot forms in your deep veins, it may be called a deep vein thrombosis, or DVT for short. Blood clots can happen  in any part of the body where blood flows, but they are most common in the arms and legs. If a piece of a blood clot breaks free and travels to the lungs, it is called a pulmonary embolus (PE). A PE can be a very serious problem.         Being in the hospital or having surgery can raise your chances of getting a blood clot because you may not be well enough to move around as much as you normally do.         Ways you can help prevent blood clots in the hospital           Wearing SCDs. SCDs stands for Sequential Compression Devices.   SCDs are special sleeves that wrap around your legs  They attach to a pump that fills them with air to gently squeeze your legs every few minutes.   This helps return the blood in your legs to your heart.   SCDs should only be taken off when walking or bathing.   SCDs may not be comfortable, but they can help save your life.                                            Wearing compression stockings - if your doctor orders them. These special snug fitting stockings gently squeeze your legs to help blood flow.       Walking. Walking helps move the blood in your legs.   If your doctor says it is ok, try walking the halls at least   5 times a day. Ask us to help you get up, so you don't fall.      Taking any blood thinning medicines your doctor orders.        Page 1 of 2            [facility]; 3/23   Ways you can help prevent blood clots at home         Wearing compression stockings - if your doctor orders them. ? Walking - to help move the blood in your legs.       Taking any blood thinning medicines your doctor orders.      Signs of a blood clot or PE        Tell your doctor or nurse know right away if you have of the problems listed below.    If you are at home, seek medical care right away. Call 911 for chest pain or problems breathing.          Signs of a blood clot (DVT) - such as pain,  swelling, redness or warmth in your arm or leg      Signs of a pulmonary embolism (PE) -  such as chest pain or feeling short of breath

## 2025-02-06 NOTE — CPM/PAT H&P
CPM/PAT Evaluation       Name: Salazar Jesus (Salazar Jesus)  /Age: 1950/74 y.o.     In-Person       Chief Complaint: BPH associated with nocturia     HPI  Patient is an alert and oriented 74 year old female scheduled for a  Greenlight Photoselective Vaporization of Prostate on 25 with Dr. Singh for  BPH associated with nocturia . PMHX includes HTN, Hypothyroidism, gout, BPH, ESRD. Presents to [facility] PAT today for perioperative risk stratification and optimization.     Past Medical History:   Diagnosis Date    BPH (benign prostatic hyperplasia)     Chronic kidney disease     ESRD - Dialysis 3 x a week    Dental crown present     loose; on upper left incisor    ESRD (end stage renal disease) on dialysis (Multi)     Dialysis MWF    Gout     Hemodialysis status ([facility])     Lt arm fistula    HTN (hypertension)     Hyperlipidemia     Hypothyroidism     ALLISON on CPAP        Past Surgical History:   Procedure Laterality Date    KNEE CARTILAGE SURGERY Left     Knee surgery    ROTATOR CUFF REPAIR Right 2017    THROAT SURGERY      w/ broken jaw    WISDOM TOOTH EXTRACTION Bilateral        Patient Sexual activity questions deferred to the physician.    Family History   Problem Relation Name Age of Onset    Heart disease Mother      Stroke Father         Allergies   Allergen Reactions    Allopurinol Itching       Prior to Admission medications    Medication Sig Start Date End Date Taking? Authorizing Provider   alfuzosin (Uroxatral) 10 mg 24 hr tablet Take 1 tablet (10 mg) by mouth once daily. 24  Rosemary Singh MD   amLODIPine (Norvasc) 10 mg tablet TAKE 1 TABLET BY MOUTH EVERY DAY 25   Christopher D'Amico,    aspirin 81 mg EC tablet Take 1 tablet (81 mg) by mouth once daily.    Historical Provider, MD   doxazosin (Cardura) 1 mg tablet Take 1 tablet (1 mg) by mouth once daily at bedtime.    Historical Provider, MD   febuxostat (Uloric) 40 mg tablet Take 1 tablet (40 mg) by mouth once  "daily. 2/15/24 2/14/25  Christopher D'Amico, DO   hydrocortisone 2.5 % cream once daily as needed for rash. 8/25/21   Historical Provider, MD   labetalol (Normodyne) 100 mg tablet Take 1.5 tablets (150 mg) by mouth 2 times a day.    Historical Provider, MD   levothyroxine (Synthroid, Levoxyl) 150 mcg tablet Take 1 tablet (150 mcg) by mouth once daily in the morning. Take before meals. 2/4/25   Christopher D'Amico, DO   relugolix (Orgovyx) 120 mg tablet Take 1 tablet (120 mg total) by mouth once daily.  If more than 7 days in a row are missed, take 3 tablets by mouth once before going back to 1 tablet per day 1/6/25 5/31/26  Milly Betancourt MD PhD   rosuvastatin (Crestor) 40 mg tablet Take 1 tablet (40 mg) by mouth once daily. 3/24/23 11/4/24  Christopher D'Amico, DO   sevelamer carbonate (Renvela) 800 mg tablet Take 1 tablet (800 mg) by mouth 3 times daily (morning, midday, late afternoon). Swallow tablet whole; do not crush, break, or chew.    Historical Provider, MD   sodium bicarbonate 650 mg tablet Take 1 tablet (650 mg) by mouth 2 times a day. 2/4/25 5/5/25  Christopher D'Amico, DO   Vitamin D3 50 mcg (2,000 unit) capsule TAKE 1 CAPSULE BY MOUTH EVERY DAY 1/30/24   Christopher D'Amico, DO   levothyroxine (Synthroid, Levoxyl) 150 mcg tablet TAKE 1 TABLET (150 MCG) BY MOUTH ONCE DAILY IN THE MORNING. TAKE BEFORE MEALS. 10/6/24 2/3/25  Christopher D'Amico, DO   relugolix (Orgovyx) 120 mg tablet Take 3 tablets on day 1, then 1 tablet a day onwards daily. If missed more than 7 days in a row, take 3 tabs before going back to 1 tab per day 1/6/25 1/31/25  Milly Betancourt MD PhD   sodium bicarbonate 650 mg tablet Take 1 tablet (650 mg) by mouth 2 times a day.  2/3/25  Historical Provider, MD        Visit Vitals  /78   Pulse 59   Temp 36.6 °C (97.9 °F) (Temporal)   Resp 18   Ht 1.651 m (5' 5\")   Wt 73 kg (160 lb 14.4 oz)   SpO2 97%   BMI 26.78 kg/m²   Smoking Status Former   BSA 1.83 m²       Review of Systems "   Constitutional: Negative for chills, decreased appetite, diaphoresis, fever and malaise/fatigue.   Eyes:  Negative for blurred vision and double vision.   Cardiovascular:  Negative for chest pain, claudication, cyanosis, dyspnea on exertion, irregular heartbeat, leg swelling, near-syncope and palpitations.   Respiratory:  Negative for cough, hemoptysis, shortness of breath and wheezing.    Endocrine: Negative for cold intolerance, heat intolerance, polydipsia, polyphagia and polyuria.   Gastrointestinal:  Negative for abdominal pain, constipation, diarrhea, dysphagia, nausea and vomiting.   Genitourinary:  Negative for bladder incontinence, dysuria, hematuria, incomplete emptying, nocturia, frequency, pelvic pain and urgency.   Neurological:  Negative for headaches, light-headedness, paresthesias, sensory change and weakness.   Psychiatric/Behavioral:  Negative for altered mental status.    Musculoskeletal: Negative for myalgias, arthralgias     Vitals and nursing note reviewed.     Physical exam  Constitutional:       Appearance: Normal appearance. He is Overweight.   HENT:      Head: Normocephalic and atraumatic.      Mouth/Throat:      Mouth: Mucous membranes are moist.      Pharynx: Oropharynx is clear.   Eyes:      Extraocular Movements: Extraocular movements intact.      Conjunctiva/sclera: Conjunctivae normal.      Pupils: Pupils are equal, round, and reactive to light.   Cardiovascular:      PMI at left midclavicular line. Normal rate. Regular rhythm. Normal S1. Normal S2.       Murmurs: There is no murmur.      No gallop.  No click. No rub.       No audible carotid bruit     No lower extremity edema on exam  Pulmonary:      Effort: Pulmonary effort is normal.      Breath sounds: Normal breath sounds.   Abdominal:      General: Abdomen is flat. Bowel sounds are normal.      Palpations: Abdomen is soft and non-tender  Musculoskeletal:      Cervical back: Normal range of motion and neck supple.   Skin:      General: Skin is warm and dry.      Capillary Refill: Capillary refill takes less than 2 seconds.   Neurological:      General: No focal deficit present.      Mental Status: He is alert and oriented to person, place, and time. Mental status is at baseline.   Psychiatric:         Mood and Affect: Mood normal.         Behavior: Behavior normal.         Thought Content: Thought content normal.         Judgment: Judgment normal.     Vitals and nursing note reviewed. Physical exam within normal limits.       DASI Risk Score      Flowsheet Row Pre-Admission Testing from 2/6/2025 in [facility] Pre-Admission Testing from 10/3/2024 in [facility]   Can you take care of yourself (eat, dress, bathe, or use toilet)?  2.75 filed at 02/06/2025 1202 2.75 filed at 10/03/2024 1105   Can you walk indoors, such as around your house? 1.75 filed at 02/06/2025 1202 1.75 filed at 10/03/2024 1105   Can you walk a block or two on level ground?  2.75 filed at 02/06/2025 1202 2.75 filed at 10/03/2024 1105   Can you climb a flight of stairs or walk up a hill? 5.5 filed at 02/06/2025 1202 5.5 filed at 10/03/2024 1105   Can you run a short distance? 8 filed at 02/06/2025 1202 8 filed at 10/03/2024 1105   Can you do light work around the house like dusting or washing dishes? 2.7 filed at 02/06/2025 1202 2.7 filed at 10/03/2024 1105   Can you do moderate work around the house like vacuuming, sweeping floors or carrying groceries? 3.5 filed at 02/06/2025 1202 3.5 filed at 10/03/2024 1105   Can you do heavy work around the house like scrubbing floors or lifting and moving heavy furniture?  8 filed at 02/06/2025 1202 8 filed at 10/03/2024 1105   Can you do yard work like raking leaves, weeding or pushing a mower? 4.5 filed at 02/06/2025 1202 4.5 filed at 10/03/2024 1105   Can you have sexual relations? 5.25 filed at 02/06/2025 1202 5.25 filed at 10/03/2024 1105   Can you participate in moderate recreational activities  like golf, bowling, dancing, doubles tennis or throwing a baseball or football? 0 filed at 02/06/2025 1202 6 filed at 10/03/2024 1105   Can you participate in strenous sports like swimming, singles tennis, football, basketball, or skiing? 0 filed at 02/06/2025 1202 0 filed at 10/03/2024 1105   DASI SCORE 44.7 filed at 02/06/2025 1202 50.7 filed at 10/03/2024 1105   METS Score (Will be calculated only when all the questions are answered) 8.2 filed at 02/06/2025 1202 9 filed at 10/03/2024 1105          Caprini DVT Assessment      Flowsheet Row Pre-Admission Testing from 2/6/2025 in [facility] Pre-Admission Testing from 10/3/2024 in [facility]   DVT Score (IF A SCORE IS NOT CALCULATING, MUST SELECT A BMI TO COMPLETE) 8 filed at 02/06/2025 1200 5 filed at 10/03/2024 1104   Medical Factors Present cancer, chemotherapy, or previous malignancy filed at 02/06/2025 1200 --   Surgical Factors Major surgery planned, including arthroscopic and laproscopic (1-2 hours) filed at 02/06/2025 1200 Minor surgery planned filed at 10/03/2024 1104   BMI (BMI MUST BE CHOSEN) 30 or less filed at 02/06/2025 1200 30 or less filed at 10/03/2024 1104          Modified Frailty Index      Flowsheet Row Pre-Admission Testing from 10/3/2024 in [facility]   Non-independent functional status (problems with dressing, bathing, personal grooming, or cooking) 0 filed at 10/03/2024 1106   History of diabetes mellitus  0 filed at 10/03/2024 1106   History of COPD 0 filed at 10/03/2024 1106   History of CHF No filed at 10/03/2024 1106   History of MI 0 filed at 10/03/2024 1106   History of Percutaneous Coronary Intervention, Cardiac Surgery, or Angina No filed at 10/03/2024 1106   Hypertension requiring the use of medication  0.0909 filed at 10/03/2024 1106   Peripheral vascular disease 0 filed at 10/03/2024 1106   Impaired sensorium (cognitive impairement or loss, clouding, or delirium) 0 filed at  10/03/2024 1106   TIA or CVA withouy residual deficit 0 filed at 10/03/2024 1106   Cerebrovascular accident with deficit 0 filed at 10/03/2024 1106   Modified Frailty Index Calculator .0909 filed at 10/03/2024 1106          CHADS2 Stroke Risk  Current as of 9 minutes ago        N/A 3 to 100%: High Risk   2 to < 3%: Medium Risk   0 to < 2%: Low Risk     Last Change: N/A          This score determines the patient's risk of having a stroke if the patient has atrial fibrillation.        This score is not applicable to this patient. Components are not calculated.          Revised Cardiac Risk Index      Flowsheet Row Pre-Admission Testing from 2/6/2025 in [facility] Pre-Admission Testing from 10/3/2024 in [facility]   High-Risk Surgery (Intraperitoneal, Intrathoracic,Suprainguinal vascular) 0 filed at 02/06/2025 1203 0 filed at 10/03/2024 1106   History of ischemic heart disease (History of MI, History of positive exercuse test, Current chest paint considered due to myocardial ischemia, Use of nitrate therapy, ECG with pathological Q Waves) 0 filed at 02/06/2025 1203 0 filed at 10/03/2024 1106   History of congestive heart failure (pulmonary edemia, bilateral rales or S3 gallop, Paroxysmal nocturnal dyspnea, CXR showing pulmonary vascular redistribution) 0 filed at 02/06/2025 1203 0 filed at 10/03/2024 1106   History of cerebrovascular disease (Prior TIA or stroke) 0 filed at 02/06/2025 1203 0 filed at 10/03/2024 1106   Pre-operative insulin treatment 0 filed at 02/06/2025 1203 0 filed at 10/03/2024 1106   Pre-operative creatinine>2 mg/dl 1 filed at 02/06/2025 1203 1 filed at 10/03/2024 1106   Revised Cardiac Risk Calculator 1 filed at 02/06/2025 1203 1 filed at 10/03/2024 1106          Apfel Simplified Score      Flowsheet Row Pre-Admission Testing from 10/3/2024 in [facility]   Smoking status 1 filed at 10/03/2024 1106   History of motion sickness or PONV  0 filed at  10/03/2024 1106   Use of postoperative opioids 0 filed at 10/03/2024 1106   Gender - Female 0=No filed at 10/03/2024 1106   Apfel Simplified Score Calculator 1 filed at 10/03/2024 1106          Risk Analysis Index Results This Encounter    No data found in the last 10 encounters.       Stop Bang Score      Flowsheet Row Pre-Admission Testing from 2/6/2025 in [facility] Pre-Admission Testing from 10/3/2024 in [facility]   Do you snore loudly? 1 filed at 02/06/2025 1045 1 filed at 10/03/2024 1031   Do you often feel tired or fatigued after your sleep? 1 filed at 02/06/2025 1045 0 filed at 10/03/2024 1031   Has anyone ever observed you stop breathing in your sleep? 1 filed at 02/06/2025 1045 1 filed at 10/03/2024 1031   Do you have or are you being treated for high blood pressure? 1 filed at 02/06/2025 1045 1 filed at 10/03/2024 1031   Recent BMI (Calculated) 26.8 filed at 02/06/2025 1045 26.9 filed at 10/03/2024 1031   Is BMI greater than 35 kg/m2? 0=No filed at 02/06/2025 1045 0=No filed at 10/03/2024 1031   Age older than 50 years old? 1=Yes filed at 02/06/2025 1045 1=Yes filed at 10/03/2024 1031   Is your neck circumference greater than 17 inches (Male) or 16 inches (Female)? 1 filed at 02/06/2025 1045 1 filed at 10/03/2024 1031   Gender - Male 1=Yes filed at 02/06/2025 1045 1=Yes filed at 10/03/2024 1031   STOP-BANG Total Score 7 filed at 02/06/2025 1045 6 filed at 10/03/2024 1031          Prodigy: High Risk  Total Score: 20              Prodigy Age Score      Prodigy Gender Score          ARISCAT Score for Postoperative Pulmonary Complications      Flowsheet Row Pre-Admission Testing from 2/6/2025 in [facility]   Age Calculated Score 3 filed at 02/06/2025 1203   Preoperative SpO2 0 filed at 02/06/2025 1203   Respiratory infection in the last month Either upper or lower (i.e., URI, bronchitis, pneumonia), with fever and antibiotic treatment 0 filed at 02/06/2025  1203   Preoperative anemia (Hgb less than 10 g/dl) 0 filed at 02/06/2025 1203   Surgical incision  0 filed at 02/06/2025 1203   Duration of surgery  0 filed at 02/06/2025 1203   Emergency Procedure  0 filed at 02/06/2025 1203   ARISCAT Total Score  3 filed at 02/06/2025 1203          Yoanna Perioperative Risk for Myocardial Infarction or Cardiac Arrest (KIMBERLEY)    No data to display         Assessment & Plan:    Neuro:  No diagnosis or significant findings on chart review or clinical presentation and evaluation.     HEENT/Airway:  ALLISON on CPAP  STOP-BANG Score- 7 points high risk for ALLISON    Mallampati::  II    TM distance::  >3 FB    Neck ROM::  Full  Dentures-reports upper right bridge  Crowns-reports upper left  Implants-denies    Cardiovascular:  HTN, HLD (labetalol, amlodipine, eplerenone, rosuvastatin)   METS: 8.2  RCRI: 1 point, 6.0% risk for postoperative MACE   KIMBERLEY: 0.5% risk for postoperative MACE  EKG -normal sinus rhythm 10/1/24    Pulmonary:  No diagnosis or significant findings on chart review or clinical presentation and evaluation.   ARISCAT: <26 points, 1.6% risk of in-hospital postoperative pulmonary complication  PRODIGY: Low risk for opioid induced respiratory depression  Smoking History-He has never smoked.  Deep breathing handout given    Renal/Urinary:   ESRD dialysis MWF follows with nephrology has an AV fistula left arm   BPH (alfuzosin)   Prostate cancer  the patient is at increased risk of perioperative renal complications secondary to HTN. Preventative measures include BP monitoring, medication compliance, and hydration management.   CMP-Pending  Creatinine-  GFR-    Endocrine:  No diagnosis or significant findings on chart review or clinical presentation and evaluation.   [id number]    Hematologic/Immunology:  No diagnosis or significant findings on chart review or clinical presentation and evaluation.  The patient is not a Jehovah’s witness and will accept blood and blood products if  medically indicated.   History of previous blood transfusions No  CBC-Pending  HGB-Pending  Caprini Score 8, patient at High for postoperative DVT. Pt supplied education/VTE handout  Anticoagulation use: Yes     Gastrointestinal:   Hypothyroidism (levothyroxine)   Recreational drug use: none  Alcohol use none    Infectious disease:   No diagnosis or significant findings on chart review or clinical presentation and evaluation.     Musculoskeletal:   No diagnosis or significant findings on chart review or clinical presentation and evaluation.   JHFRAT score-6 points. moderate risk for falls    Anesthesia:  ASA 3 - Patient with moderate systemic disease with functional limitations  Anticipated anesthesia-general  History of General anesthesia- yes  Complications- No anesthesia complications  No family history of anesthesia complications    Abnormalities noted on PAT evaluation: No    Labs & Imaging ordered:  Labs in  Nickel/metal allergy-negative  Shellfish allergy-negative    Discussed with patient medication instructions, NPO guidelines, and any questions or concerns.   Pt cleared by cardiology, to stop asa 7 days before surgery    time spent 45 minutes

## 2025-02-15 LAB
ANION GAP SERPL CALCULATED.4IONS-SCNC: 12 MMOL/L (CALC) (ref 7–17)
BACTERIA UR CULT: NORMAL
BUN SERPL-MCNC: 23 MG/DL (ref 7–25)
BUN/CREAT SERPL: 5 (CALC) (ref 6–22)
CALCIUM SERPL-MCNC: 9.8 MG/DL (ref 8.6–10.3)
CHLORIDE SERPL-SCNC: 96 MMOL/L (ref 98–110)
CO2 SERPL-SCNC: 32 MMOL/L (ref 20–32)
CREAT SERPL-MCNC: 4.45 MG/DL (ref 0.7–1.28)
EGFRCR SERPLBLD CKD-EPI 2021: 13 ML/MIN/1.73M2
ERYTHROCYTE [DISTWIDTH] IN BLOOD BY AUTOMATED COUNT: 14.5 % (ref 11–15)
GLUCOSE SERPL-MCNC: 103 MG/DL (ref 65–99)
HCT VFR BLD AUTO: 33.6 % (ref 38.5–50)
HGB BLD-MCNC: 11.3 G/DL (ref 13.2–17.1)
INR PPP: 1
MCH RBC QN AUTO: 33.7 PG (ref 27–33)
MCHC RBC AUTO-ENTMCNC: 33.6 G/DL (ref 32–36)
MCV RBC AUTO: 100.3 FL (ref 80–100)
PLATELET # BLD AUTO: 145 THOUSAND/UL (ref 140–400)
PMV BLD REES-ECKER: 11 FL (ref 7.5–12.5)
POTASSIUM SERPL-SCNC: 4.3 MMOL/L (ref 3.5–5.3)
PROTHROMBIN TIME: 10.9 SEC (ref 9–11.5)
RBC # BLD AUTO: 3.35 MILLION/UL (ref 4.2–5.8)
SODIUM SERPL-SCNC: 140 MMOL/L (ref 135–146)
WBC # BLD AUTO: 4.7 THOUSAND/UL (ref 3.8–10.8)

## 2025-02-27 ENCOUNTER — SPECIALTY PHARMACY (OUTPATIENT)
Dept: PHARMACY | Facility: CLINIC | Age: 75
End: 2025-02-27

## 2025-02-27 ENCOUNTER — ANESTHESIA (OUTPATIENT)
Dept: OPERATING ROOM | Facility: HOSPITAL | Age: 75
End: 2025-02-27
Payer: MEDICARE

## 2025-02-27 ENCOUNTER — ANESTHESIA EVENT (OUTPATIENT)
Dept: OPERATING ROOM | Facility: HOSPITAL | Age: 75
End: 2025-02-27
Payer: MEDICARE

## 2025-02-27 ENCOUNTER — HOSPITAL ENCOUNTER (OUTPATIENT)
Facility: HOSPITAL | Age: 75
Setting detail: OUTPATIENT SURGERY
Discharge: HOME | End: 2025-02-27
Attending: UROLOGY | Admitting: UROLOGY
Payer: MEDICARE

## 2025-02-27 VITALS
HEART RATE: 60 BPM | RESPIRATION RATE: 16 BRPM | HEIGHT: 65 IN | BODY MASS INDEX: 27.07 KG/M2 | SYSTOLIC BLOOD PRESSURE: 146 MMHG | WEIGHT: 162.5 LBS | TEMPERATURE: 97.5 F | DIASTOLIC BLOOD PRESSURE: 78 MMHG | OXYGEN SATURATION: 99 %

## 2025-02-27 DIAGNOSIS — N40.1 BPH ASSOCIATED WITH NOCTURIA: Primary | ICD-10-CM

## 2025-02-27 DIAGNOSIS — R35.1 BPH ASSOCIATED WITH NOCTURIA: Primary | ICD-10-CM

## 2025-02-27 PROCEDURE — 52648 LASER SURGERY OF PROSTATE: CPT | Performed by: UROLOGY

## 2025-02-27 PROCEDURE — 2500000004 HC RX 250 GENERAL PHARMACY W/ HCPCS (ALT 636 FOR OP/ED): Performed by: INTERNAL MEDICINE

## 2025-02-27 PROCEDURE — 3700000001 HC GENERAL ANESTHESIA TIME - INITIAL BASE CHARGE: Performed by: UROLOGY

## 2025-02-27 PROCEDURE — 2500000005 HC RX 250 GENERAL PHARMACY W/O HCPCS: Performed by: UROLOGY

## 2025-02-27 PROCEDURE — 7100000010 HC PHASE TWO TIME - EACH INCREMENTAL 1 MINUTE: Performed by: UROLOGY

## 2025-02-27 PROCEDURE — 2720000007 HC OR 272 NO HCPCS: Performed by: UROLOGY

## 2025-02-27 PROCEDURE — 3600000010 HC OR TIME - EACH INCREMENTAL 1 MINUTE - PROCEDURE LEVEL FIVE: Performed by: UROLOGY

## 2025-02-27 PROCEDURE — 7100000001 HC RECOVERY ROOM TIME - INITIAL BASE CHARGE: Performed by: UROLOGY

## 2025-02-27 PROCEDURE — 3600000005 HC OR TIME - INITIAL BASE CHARGE - PROCEDURE LEVEL FIVE: Performed by: UROLOGY

## 2025-02-27 PROCEDURE — 7100000009 HC PHASE TWO TIME - INITIAL BASE CHARGE: Performed by: UROLOGY

## 2025-02-27 PROCEDURE — 7100000002 HC RECOVERY ROOM TIME - EACH INCREMENTAL 1 MINUTE: Performed by: UROLOGY

## 2025-02-27 PROCEDURE — C1889 IMPLANT/INSERT DEVICE, NOC: HCPCS | Performed by: UROLOGY

## 2025-02-27 PROCEDURE — 3700000002 HC GENERAL ANESTHESIA TIME - EACH INCREMENTAL 1 MINUTE: Performed by: UROLOGY

## 2025-02-27 PROCEDURE — RXMED WILLOW AMBULATORY MEDICATION CHARGE

## 2025-02-27 RX ORDER — OXYCODONE HYDROCHLORIDE 5 MG/1
5 TABLET ORAL EVERY 4 HOURS PRN
Status: DISCONTINUED | OUTPATIENT
Start: 2025-02-27 | End: 2025-02-27 | Stop reason: HOSPADM

## 2025-02-27 RX ORDER — FENTANYL CITRATE 50 UG/ML
INJECTION, SOLUTION INTRAMUSCULAR; INTRAVENOUS AS NEEDED
Status: DISCONTINUED | OUTPATIENT
Start: 2025-02-27 | End: 2025-02-27

## 2025-02-27 RX ORDER — PROPOFOL 10 MG/ML
INJECTION, EMULSION INTRAVENOUS AS NEEDED
Status: DISCONTINUED | OUTPATIENT
Start: 2025-02-27 | End: 2025-02-27

## 2025-02-27 RX ORDER — CEFAZOLIN SODIUM 2 G/100ML
2 INJECTION, SOLUTION INTRAVENOUS ONCE
Status: DISCONTINUED | OUTPATIENT
Start: 2025-02-27 | End: 2025-02-27 | Stop reason: HOSPADM

## 2025-02-27 RX ORDER — CEFAZOLIN 1 G/1
INJECTION, POWDER, FOR SOLUTION INTRAVENOUS AS NEEDED
Status: DISCONTINUED | OUTPATIENT
Start: 2025-02-27 | End: 2025-02-27

## 2025-02-27 RX ORDER — ONDANSETRON HYDROCHLORIDE 2 MG/ML
INJECTION, SOLUTION INTRAVENOUS AS NEEDED
Status: DISCONTINUED | OUTPATIENT
Start: 2025-02-27 | End: 2025-02-27

## 2025-02-27 RX ORDER — FENTANYL CITRATE 50 UG/ML
50 INJECTION, SOLUTION INTRAMUSCULAR; INTRAVENOUS EVERY 5 MIN PRN
Status: DISCONTINUED | OUTPATIENT
Start: 2025-02-27 | End: 2025-02-27 | Stop reason: HOSPADM

## 2025-02-27 RX ORDER — FEBUXOSTAT 40 MG/1
40 TABLET, FILM COATED ORAL DAILY
COMMUNITY
End: 2025-03-03 | Stop reason: SDUPTHER

## 2025-02-27 RX ORDER — ONDANSETRON HYDROCHLORIDE 2 MG/ML
4 INJECTION, SOLUTION INTRAVENOUS ONCE AS NEEDED
Status: DISCONTINUED | OUTPATIENT
Start: 2025-02-27 | End: 2025-02-27 | Stop reason: HOSPADM

## 2025-02-27 RX ORDER — FENTANYL CITRATE 50 UG/ML
25 INJECTION, SOLUTION INTRAMUSCULAR; INTRAVENOUS EVERY 5 MIN PRN
Status: DISCONTINUED | OUTPATIENT
Start: 2025-02-27 | End: 2025-02-27 | Stop reason: HOSPADM

## 2025-02-27 RX ORDER — PHENAZOPYRIDINE HYDROCHLORIDE 100 MG/1
100 TABLET, FILM COATED ORAL 3 TIMES DAILY
Qty: 42 TABLET | Refills: 0 | Status: SHIPPED | OUTPATIENT
Start: 2025-02-27 | End: 2025-03-13

## 2025-02-27 RX ORDER — LABETALOL HYDROCHLORIDE 5 MG/ML
5 INJECTION, SOLUTION INTRAVENOUS ONCE AS NEEDED
Status: DISCONTINUED | OUTPATIENT
Start: 2025-02-27 | End: 2025-02-27 | Stop reason: HOSPADM

## 2025-02-27 RX ORDER — LIDOCAINE HYDROCHLORIDE 10 MG/ML
0.1 INJECTION, SOLUTION EPIDURAL; INFILTRATION; INTRACAUDAL; PERINEURAL ONCE
Status: DISCONTINUED | OUTPATIENT
Start: 2025-02-27 | End: 2025-02-27 | Stop reason: HOSPADM

## 2025-02-27 RX ORDER — LIDOCAINE HYDROCHLORIDE 20 MG/ML
JELLY TOPICAL AS NEEDED
Status: DISCONTINUED | OUTPATIENT
Start: 2025-02-27 | End: 2025-02-27 | Stop reason: HOSPADM

## 2025-02-27 RX ORDER — LIDOCAINE HYDROCHLORIDE 20 MG/ML
INJECTION, SOLUTION INFILTRATION; PERINEURAL AS NEEDED
Status: DISCONTINUED | OUTPATIENT
Start: 2025-02-27 | End: 2025-02-27

## 2025-02-27 RX ORDER — MEPERIDINE HYDROCHLORIDE 25 MG/ML
12.5 INJECTION INTRAMUSCULAR; INTRAVENOUS; SUBCUTANEOUS EVERY 10 MIN PRN
Status: DISCONTINUED | OUTPATIENT
Start: 2025-02-27 | End: 2025-02-27 | Stop reason: HOSPADM

## 2025-02-27 RX ORDER — CEPHALEXIN 500 MG/1
500 CAPSULE ORAL 2 TIMES DAILY
Qty: 14 CAPSULE | Refills: 0 | Status: SHIPPED | OUTPATIENT
Start: 2025-02-27 | End: 2025-03-06

## 2025-02-27 RX ADMIN — DEXAMETHASONE SODIUM PHOSPHATE 4 MG: 4 INJECTION, SOLUTION INTRAMUSCULAR; INTRAVENOUS at 13:00

## 2025-02-27 RX ADMIN — CEFAZOLIN 2 G: 330 INJECTION, POWDER, FOR SOLUTION INTRAMUSCULAR; INTRAVENOUS at 13:00

## 2025-02-27 RX ADMIN — SODIUM CHLORIDE: 9 INJECTION, SOLUTION INTRAVENOUS at 12:52

## 2025-02-27 RX ADMIN — ONDANSETRON 4 MG: 2 INJECTION, SOLUTION INTRAMUSCULAR; INTRAVENOUS at 13:00

## 2025-02-27 RX ADMIN — PROPOFOL 50 MG: 10 INJECTION, EMULSION INTRAVENOUS at 12:57

## 2025-02-27 RX ADMIN — FENTANYL CITRATE 50 MCG: 50 INJECTION INTRAMUSCULAR; INTRAVENOUS at 13:05

## 2025-02-27 RX ADMIN — LIDOCAINE HYDROCHLORIDE 50 MG: 20 INJECTION, SOLUTION INFILTRATION; PERINEURAL at 12:55

## 2025-02-27 RX ADMIN — PROPOFOL 150 MG: 10 INJECTION, EMULSION INTRAVENOUS at 12:55

## 2025-02-27 RX ADMIN — FENTANYL CITRATE 50 MCG: 50 INJECTION INTRAMUSCULAR; INTRAVENOUS at 13:34

## 2025-02-27 ASSESSMENT — PAIN SCALES - GENERAL
PAINLEVEL_OUTOF10: 0 - NO PAIN

## 2025-02-27 ASSESSMENT — PAIN - FUNCTIONAL ASSESSMENT
PAIN_FUNCTIONAL_ASSESSMENT: 0-10
PAIN_FUNCTIONAL_ASSESSMENT: FLACC (FACE, LEGS, ACTIVITY, CRY, CONSOLABILITY)

## 2025-02-27 NOTE — OP NOTE
Greenlight Photoselective Vaporization of Prostate Operative Note     Date: 2025  OR Location: [facility]    Name: Salazar Jesus, : 1950, Age: 74 y.o., MRN: [id number], Sex: male    Diagnosis  Pre-op Diagnosis      * BPH associated with nocturia [N40.1, R35.1] Post-op Diagnosis     * BPH associated with nocturia [N40.1, R35.1]     Procedures  Greenlight Photoselective Vaporization of Prostate  17314 - [place] LASER VAPORIZATION OF PROSTATE FOR URINE FLOW      Surgeons      * Rosemary Singh - Primary    Resident/Fellow/Other Assistant:  Surgeons and Role:  * No surgeons found with a matching role *    Staff:   Circulator: Paula Otto Person: Tawana    Anesthesia Staff: Anesthesiologist: Johnny Dean MD  CRNA: Sea Poe, APRN-CNP, APRN-CRNA    Procedure Summary  Anesthesia: General  ASA: III  Estimated Blood Loss: 2mL  Intra-op Medications:   Administrations occurring from 1300 to 1415 on 25:   Medication Name Total Dose   lidocaine 2 % mucosal jelly (Uro-Jet) 1 Application   ceFAZolin (Ancef) vial 1 g 2 g   dexAMETHasone (Decadron) 4 mg/mL 4 mg   fentaNYL (Sublimaze) injection 50 mcg/mL 100 mcg   ondansetron 2 mg/mL 4 mg   NaCl 0.9 % bolus Cannot be calculated              Anesthesia Record               Intraprocedure I/O Totals          Intake    NaCl 0.9 % bolus 250.00 mL    Total Intake 250 mL          Specimen: No specimens collected              Drains and/or Catheters:   Urethral Catheter Coude;Latex 20 Fr. (Active)       Tourniquet Times:         Implants:     Findings: bph    Indications: Salazar Jesus is an 74 y.o. male who is having surgery for BPH associated with nocturia [N40.1, R35.1]. BPH    The patient was seen in the preoperative area. The risks, benefits, complications, treatment options, non-operative alternatives, expected recovery and outcomes were discussed with the patient. The possibilities of reaction to medication, pulmonary aspiration, injury to  surrounding structures, bleeding, recurrent infection, the need for additional procedures, failure to diagnose a condition, and creating a complication requiring transfusion or operation were discussed with the patient. The patient concurred with the proposed plan, giving informed consent.  The site of surgery was properly noted/marked if necessary per policy. The patient has been actively warmed in preoperative area. Preoperative antibiotics have been ordered and given within 1 hours of incision. Venous thrombosis prophylaxis have been ordered including bilateral sequential compression devices    Procedure Details: After obtaining informed consent, the patient was brought to the operating room, placed on the operating table in a supine position. Preoperative time-out was performed.  Preoperative urine culture was reviewed.  The patient received antibiotic intraoperatively for operative prophylaxis. Pt underwent general anesthesia without complication.  The patient was repositioned into the dorsal lithotomy position and prepped and draped in the usual sterile fashion.  A 22-Occitan continuous-flow laser cystoscope was inserted into the  bladder via the urethra atraumatically using a visual obturator, pan cystoscopy was performed.  There were no abnormal tumors or lesions noted in the bladder.  Bilateral ureteral orifices were identified in the normal orthotopic position.  The scope was then replaced into the prostatic fossa.  There was bilobar prostatic enlargement.  There was no intravesical median lobe.  At this time, we took out the visual obturator and inserted a continuous-flow laser working element.  A 180 watt GreenLight laser was then inserted.  We then proceeded to vaporize the prostatic tissue.  Our vaporization was started on the right lateral lobe at the level of the bladder neck and vaporization was carried down to the level of the verumontanum distally.  Care was taken at our proximal landmark to  visualize the ureteral orifices at all times so that they would be excluded from our vaporization.  The right lateral lobe was vaporized down to the level of the prostatic capsule.  Attention was then turned to the left side and the procedure was repeated here.  Again, we took care to perform vaporization from the level of the bladder neck down to the level of the verumontanum, avoiding the external urinary sphincter.   At the conclusion of the procedure, there was a large TUR-like defect urinary channel that was wide open.  The ureteral orifices could be identified orthotopically, again outside of our vaporization and unharmed.  The external sphincter was also noted to be intact.  At the conclusion of the procedure, again the patient's bladder was emptied.  The cystoscope was removed from the bladder, and a single-lumen catheter was then placed and 20 cc of sterile water were placed in the balloon with return of clear pink-tinged urine.  The patient was then awoken from general anesthesia, taken out of the lithotomy, and transferred to the PACU in stable condition.    Complications:  None; patient tolerated the procedure well.    Disposition: PACU - hemodynamically stable.  Condition: stable                 Additional Details:     Attending Attestation: I was present and scrubbed for the entire procedure.    Rosemary Singh  Phone Number: [phone]

## 2025-02-27 NOTE — ANESTHESIA PROCEDURE NOTES
Airway  Date/Time: 2/27/2025 12:59 PM  Urgency: elective    Airway not difficult    Staffing  Performed: CRNA   Authorized by: Johnny Dean MD    Performed by: Sea Poe, NICOLA-CNP, APRN-CRNA  Patient location during procedure: OR    Indications and Patient Condition  Indications for airway management: anesthesia  Spontaneous ventilation: present  Sedation level: deep  Preoxygenated: yes  Patient position: sniffing  Mask difficulty assessment: 1 - vent by mask    Final Airway Details  Final airway type: supraglottic airway      Successful airway: air-Q  Size 4     Number of attempts at approach: 1

## 2025-02-27 NOTE — NURSING NOTE
Patient in Phase 2; dressed and up to chair with RN assist. Tolerating po fluids, no complaint of pain and no complaint of nausea.     Significant other at bedside; discussed discharge instructions with patient and Significant other. All questions at this time answered and family verbalizes understanding of discharge instructions.     Patient clinically appropriate for discharge. IV removed and patient transported to discharge area via wheelchair.     Clarified with Dr. Singh discharge instructions.  Patient and patient's wife given all written and verbal discharge instructions.  Questions answered.  Understanding verbalized.

## 2025-02-27 NOTE — PERIOPERATIVE NURSING NOTE
AV fistula LUE present on admission-JIMY, slight bruising noted-+bruit, +thrill-limb alert applied

## 2025-02-27 NOTE — ANESTHESIA PREPROCEDURE EVALUATION
Patient: Salazar Jesus    Procedure Information       Date/Time: 02/27/25 1300    Procedure: Greenlight Photoselective Vaporization of Prostate    Location: [facility] / [facility]    Surgeons: Rosemary Singh MD            Relevant Problems   Cardiac   (+) Essential hypertension   (+) HLD (hyperlipidemia)      /Renal   (+) Benign prostatic hyperplasia with urinary obstruction   (+) ESRD (end stage renal disease) (Multi)   (+) End-stage renal disease on hemodialysis (Multi)   (+) Prostate cancer (Multi)      Endocrine   (+) Hypothyroidism   (+) Secondary hyperparathyroidism of renal origin (Multi)      Hematology   (+) Anemia of chronic disease      HEENT   (+) Bilateral sensorineural hearing loss   (+) Hearing deficit      ID   (+) Shingles       Clinical information reviewed:    Allergies  Meds               NPO Detail:  NPO/Void Status  Date of Last Liquid: 02/27/25  Time of Last Liquid: 0900  Date of Last Solid: 02/26/25  Time of Last Solid: 2100  Last Intake Type: Clear fluids; Solid meal  Time of Last Void: 1000         Physical Exam    Airway  Mallampati: II  TM distance: >3 FB  Neck ROM: full     Cardiovascular    Dental    Pulmonary    Abdominal            Anesthesia Plan    History of general anesthesia?: yes  History of complications of general anesthesia?: no    ASA 3     general     intravenous induction   Anesthetic plan and risks discussed with patient.    Plan discussed with CRNA.

## 2025-02-27 NOTE — ANESTHESIA POSTPROCEDURE EVALUATION
Patient: Salazar Jesus    Procedure Summary       Date: 02/27/25 Room / Location: [facility] / [facility]    Anesthesia Start: 1247 Anesthesia Stop: 1343    Procedure: Greenlight Photoselective Vaporization of Prostate Diagnosis:       BPH associated with nocturia      (BPH associated with nocturia [N40.1, R35.1])    Surgeons: Rosemary Singh MD Responsible Provider: Johnny Dean MD    Anesthesia Type: general ASA Status: 3            Anesthesia Type: general    Vitals Value Taken Time   /69 02/27/25 1402   Temp 36.2 °C (97.2 °F) 02/27/25 1402   Pulse 52 02/27/25 1402   Resp 16 02/27/25 1402   SpO2 98 % 02/27/25 1402       Anesthesia Post Evaluation    Patient location during evaluation: PACU  Patient participation: complete - patient participated  Level of consciousness: awake  Pain management: adequate  Airway patency: patent  Cardiovascular status: acceptable  Respiratory status: acceptable  Hydration status: acceptable  Postoperative Nausea and Vomiting: none        There were no known notable events for this encounter.

## 2025-03-03 ENCOUNTER — APPOINTMENT (OUTPATIENT)
Dept: PRIMARY CARE | Facility: CLINIC | Age: 75
End: 2025-03-03
Payer: MEDICARE

## 2025-03-03 VITALS
HEART RATE: 58 BPM | SYSTOLIC BLOOD PRESSURE: 148 MMHG | WEIGHT: 161 LBS | OXYGEN SATURATION: 96 % | DIASTOLIC BLOOD PRESSURE: 67 MMHG | HEIGHT: 65 IN | BODY MASS INDEX: 26.82 KG/M2

## 2025-03-03 DIAGNOSIS — N18.6 END-STAGE RENAL DISEASE ON HEMODIALYSIS (MULTI): ICD-10-CM

## 2025-03-03 DIAGNOSIS — N25.81 SECONDARY HYPERPARATHYROIDISM OF RENAL ORIGIN (MULTI): ICD-10-CM

## 2025-03-03 DIAGNOSIS — E78.5 HYPERLIPIDEMIA, UNSPECIFIED HYPERLIPIDEMIA TYPE: ICD-10-CM

## 2025-03-03 DIAGNOSIS — E03.9 HYPOTHYROIDISM, UNSPECIFIED TYPE: ICD-10-CM

## 2025-03-03 DIAGNOSIS — I10 HYPERTENSION, UNSPECIFIED TYPE: Primary | ICD-10-CM

## 2025-03-03 DIAGNOSIS — R35.1 BENIGN PROSTATIC HYPERPLASIA WITH NOCTURIA: ICD-10-CM

## 2025-03-03 DIAGNOSIS — M1A.9XX0 CHRONIC GOUT WITHOUT TOPHUS, UNSPECIFIED CAUSE, UNSPECIFIED SITE: ICD-10-CM

## 2025-03-03 DIAGNOSIS — E78.5 HYPERLIPIDEMIA, UNSPECIFIED: ICD-10-CM

## 2025-03-03 DIAGNOSIS — G47.33 OSA (OBSTRUCTIVE SLEEP APNEA): ICD-10-CM

## 2025-03-03 DIAGNOSIS — Z99.2 END-STAGE RENAL DISEASE ON HEMODIALYSIS (MULTI): ICD-10-CM

## 2025-03-03 DIAGNOSIS — N40.1 BENIGN PROSTATIC HYPERPLASIA WITH NOCTURIA: ICD-10-CM

## 2025-03-03 DIAGNOSIS — G89.29 CHRONIC LEFT-SIDED THORACIC BACK PAIN: ICD-10-CM

## 2025-03-03 DIAGNOSIS — M54.6 CHRONIC LEFT-SIDED THORACIC BACK PAIN: ICD-10-CM

## 2025-03-03 PROCEDURE — 1036F TOBACCO NON-USER: CPT | Performed by: STUDENT IN AN ORGANIZED HEALTH CARE EDUCATION/TRAINING PROGRAM

## 2025-03-03 PROCEDURE — 1159F MED LIST DOCD IN RCRD: CPT | Performed by: STUDENT IN AN ORGANIZED HEALTH CARE EDUCATION/TRAINING PROGRAM

## 2025-03-03 PROCEDURE — 3077F SYST BP >= 140 MM HG: CPT | Performed by: STUDENT IN AN ORGANIZED HEALTH CARE EDUCATION/TRAINING PROGRAM

## 2025-03-03 PROCEDURE — 3078F DIAST BP <80 MM HG: CPT | Performed by: STUDENT IN AN ORGANIZED HEALTH CARE EDUCATION/TRAINING PROGRAM

## 2025-03-03 PROCEDURE — 1160F RVW MEDS BY RX/DR IN RCRD: CPT | Performed by: STUDENT IN AN ORGANIZED HEALTH CARE EDUCATION/TRAINING PROGRAM

## 2025-03-03 PROCEDURE — 1124F ACP DISCUSS-NO DSCNMKR DOCD: CPT | Performed by: STUDENT IN AN ORGANIZED HEALTH CARE EDUCATION/TRAINING PROGRAM

## 2025-03-03 PROCEDURE — G2211 COMPLEX E/M VISIT ADD ON: HCPCS | Performed by: STUDENT IN AN ORGANIZED HEALTH CARE EDUCATION/TRAINING PROGRAM

## 2025-03-03 PROCEDURE — 99214 OFFICE O/P EST MOD 30 MIN: CPT | Performed by: STUDENT IN AN ORGANIZED HEALTH CARE EDUCATION/TRAINING PROGRAM

## 2025-03-03 RX ORDER — LABETALOL 100 MG/1
150 TABLET, FILM COATED ORAL 2 TIMES DAILY
Qty: 270 TABLET | Refills: 1 | Status: SHIPPED | OUTPATIENT
Start: 2025-03-03 | End: 2025-08-30

## 2025-03-03 RX ORDER — LEVOTHYROXINE SODIUM 150 UG/1
150 TABLET ORAL
Qty: 90 TABLET | Refills: 1 | Status: SHIPPED | OUTPATIENT
Start: 2025-03-03

## 2025-03-03 RX ORDER — FEBUXOSTAT 40 MG/1
40 TABLET, FILM COATED ORAL DAILY
Qty: 90 TABLET | Refills: 1 | Status: SHIPPED | OUTPATIENT
Start: 2025-03-03 | End: 2025-08-30

## 2025-03-03 RX ORDER — CYCLOBENZAPRINE HCL 5 MG
5 TABLET ORAL NIGHTLY PRN
Qty: 30 TABLET | Refills: 0 | Status: SHIPPED | OUTPATIENT
Start: 2025-03-03 | End: 2025-05-02

## 2025-03-03 RX ORDER — ROSUVASTATIN CALCIUM 40 MG/1
40 TABLET, COATED ORAL DAILY
Qty: 90 TABLET | Refills: 1 | Status: SHIPPED | OUTPATIENT
Start: 2025-03-03 | End: 2025-08-30

## 2025-03-03 RX ORDER — AMLODIPINE BESYLATE 10 MG/1
10 TABLET ORAL DAILY
Qty: 90 TABLET | Refills: 1 | Status: SHIPPED | OUTPATIENT
Start: 2025-03-03

## 2025-03-03 ASSESSMENT — PATIENT HEALTH QUESTIONNAIRE - PHQ9
1. LITTLE INTEREST OR PLEASURE IN DOING THINGS: NOT AT ALL
2. FEELING DOWN, DEPRESSED OR HOPELESS: NOT AT ALL
SUM OF ALL RESPONSES TO PHQ9 QUESTIONS 1 AND 2: 0
1. LITTLE INTEREST OR PLEASURE IN DOING THINGS: NOT AT ALL
2. FEELING DOWN, DEPRESSED OR HOPELESS: NOT AT ALL
SUM OF ALL RESPONSES TO PHQ9 QUESTIONS 1 AND 2: 0

## 2025-03-03 ASSESSMENT — ENCOUNTER SYMPTOMS
OCCASIONAL FEELINGS OF UNSTEADINESS: 0
DEPRESSION: 0
LOSS OF SENSATION IN FEET: 0

## 2025-03-03 NOTE — PROGRESS NOTES
[facility]  Outpatient Clinic Note    Subjective   Salazar Jesus is a 74 y.o. male    History of Present Illness   Patient presenting to clinic today for TOV s/p Greenlight PVP with Dr. Singh   2/27/2025. 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 He presents today for a follow up. Patient is scheduled to start radiation therapy and paceOAR + fiducial placement in February.      He has been doing well since surgery. Urine has become clear, yellow without evidence of gross hematuria or clots. He denies any fevers or chills.    Past Medical History and Surgical History   Past Medical History:   Diagnosis Date    BPH (benign prostatic hyperplasia)     Chronic kidney disease     ESRD - Dialysis 3 x a week    Dental crown present     loose; on upper left incisor    ESRD (end stage renal disease) on dialysis (Multi)     Dialysis MWF    Gout     Hemodialysis status (CMS-[facility])     Lt arm fistula    HTN (hypertension)     Hyperlipidemia     Hypothyroidism     ALLISON on CPAP      Past Surgical History:   Procedure Laterality Date    KNEE CARTILAGE SURGERY Left     Knee surgery    ROTATOR CUFF REPAIR Right 2017    THROAT SURGERY      w/ broken jaw    WISDOM TOOTH EXTRACTION Bilateral        Medications  Current Outpatient Medications on File Prior to Visit   Medication Sig Dispense Refill    amLODIPine (Norvasc) 10 mg tablet TAKE 1 TABLET BY MOUTH EVERY DAY 90 tablet 1    aspirin 81 mg EC tablet Take 1 tablet (81 mg) by mouth once daily.      cephalexin (Keflex) 500 mg capsule Take 1 capsule (500 mg) by mouth 2 times a day for 7 days. 14 capsule 0    doxazosin (Cardura) 1 mg tablet Take 1 tablet (1 mg) by mouth once daily at bedtime.      febuxostat (Uloric) 40 mg tablet Take 1 tablet (40 mg) by mouth once daily.      hydrocortisone 2.5 % cream once daily as needed for rash.       labetalol (Normodyne) 100 mg tablet Take 1.5 tablets (150 mg) by mouth 2 times a day.      levothyroxine (Synthroid, Levoxyl) 150 mcg tablet Take 1 tablet (150 mcg) by mouth once daily in the morning. Take before meals. 90 tablet 0    phenazopyridine (Pyridium) 100 mg tablet Take 1 tablet (100 mg) by mouth 3 times a day for 14 days. 42 tablet 0    relugolix (Orgovyx) 120 mg tablet Take 1 tablet (120 mg total) by mouth once daily.  If more than 7 days in a row are missed, take 3 tablets by mouth once before going back to 1 tablet per day 30 tablet 16    rosuvastatin (Crestor) 40 mg tablet Take 1 tablet (40 mg) by mouth once daily. 90 tablet 3    sevelamer carbonate (Renvela) 800 mg tablet Take 1 tablet (800 mg) by mouth 3 times daily (morning, midday, late afternoon). Swallow tablet whole; do not crush, break, or chew.      sodium bicarbonate 650 mg tablet Take 1 tablet (650 mg) by mouth 2 times a day. 180 tablet 0    Vitamin D3 50 mcg (2,000 unit) capsule TAKE 1 CAPSULE BY MOUTH EVERY DAY 90 capsule 3    [DISCONTINUED] alfuzosin (Uroxatral) 10 mg 24 hr tablet Take 1 tablet (10 mg) by mouth once daily. 90 tablet 3     No current facility-administered medications on file prior to visit.       Objective   Physicial Exam  General: Well developed, well nourished, alert and cooperative, appears in no acute distress  Eyes: Non-injected conjunctiva, sclera clear, no proptosis  Cardiac: Extremities are warm and well perfused. No edema, cyanosis or pallor.   Lungs: Breathing is easy, non-labored. Speaking in clear and complete sentences. Normal diaphragmatic movement.  MSK: Ambulatory with steady gait, unassisted  Neuro: alert and oriented to person, place and time  Psych: Demonstrates good judgement and reason, without hallucinations, abnormal affect or abnormal behaviors.  Skin: no obvious lesions, no rashes.    Orders Only on 02/13/2025   Component Date Value Ref Range Status    GLUCOSE 02/13/2025 103 (H)  65 - 99 mg/dL  Final    Comment:               Fasting reference interval     For someone without known diabetes, a glucose value  between 100 and 125 mg/dL is consistent with  prediabetes and should be confirmed with a  follow-up test.         UREA NITROGEN (BUN) 02/13/2025 23  7 - 25 mg/dL Final    CREATININE 02/13/2025 4.45 (H)  0.70 - 1.28 mg/dL Final    EGFR 02/13/2025 13 (L)  > OR = 60 mL/min/1.73m2 Final    BUN/CREATININE RATIO 02/13/2025 5 (L)  6 - 22 (calc) Final    SODIUM 02/13/2025 140  135 - 146 mmol/L Final    POTASSIUM 02/13/2025 4.3  3.5 - 5.3 mmol/L Final    CHLORIDE 02/13/2025 96 (L)  98 - 110 mmol/L Final    CARBON DIOXIDE 02/13/2025 32  20 - 32 mmol/L Final    ELECTROLYTE BALANCE 02/13/2025 12  7 - 17 mmol/L (calc) Final    CALCIUM 02/13/2025 9.8  8.6 - 10.3 mg/dL Final    WHITE BLOOD CELL COUNT 02/13/2025 4.7  3.8 - 10.8 Thousand/uL Final    RED BLOOD CELL COUNT 02/13/2025 3.35 (L)  4.20 - 5.80 Million/uL Final    HEMOGLOBIN 02/13/2025 11.3 (L)  13.2 - 17.1 g/dL Final    HEMATOCRIT 02/13/2025 33.6 (L)  38.5 - 50.0 % Final    MCV 02/13/2025 100.3 (H)  80.0 - 100.0 fL Final    MCH 02/13/2025 33.7 (H)  27.0 - 33.0 pg Final    MCHC 02/13/2025 33.6  32.0 - 36.0 g/dL Final    Comment: For adults, a slight decrease in the calculated MCHC  value (in the range of 30 to 32 g/dL) is most likely  not clinically significant; however, it should be  interpreted with caution in correlation with other  red cell parameters and the patient's clinical  condition.      RDW 02/13/2025 14.5  11.0 - 15.0 % Final    PLATELET COUNT 02/13/2025 145  140 - 400 Thousand/uL Final    MPV 02/13/2025 11.0  7.5 - 12.5 fL Final    INR 02/13/2025 1.0   Final    Comment: Reference Range                     0.9-1.1  Moderate-intensity Warfarin Therapy 2.0-3.0  Higher-intensity Warfarin Therapy   3.0-4.0          PT 02/13/2025 10.9  9.0 - 11.5 sec Final    Comment: For additional information, please refer  to  http://education.[employer]/faq/SOH840  (This link is being provided for informational/  educational purposes only.)      CULTURE, URINE, ROUTINE 02/13/2025 SEE NOTE   Final    Comment:     CULTURE, URINE, ROUTINE         Micro Number:      [id number]    Test Status:       Final    Specimen Source:   Urine    Specimen Quality:  Adequate    Result:            No Growth        Review of Systems  All other systems have been reviewed and are negative for complaint.      Assessment and Plan   We discussed postoperative urinary retention in great detail. We discussed that different medications, including anesthesia can influence urinary retention. We discussed that postoperative constipation can also contribute to urinary retention. We discussed management of urinary retention to include indwelling catheter or CIC. We discussed risks of unmanaged urinary retention including irreversible kidney injury and increased risk of UTI.     [] TOV today: Patient removed Petersen catheter on Saturday 3/1/2025  [] Drink plenty of fluids to maintain hydration and clear urine output  [] Activity restrictions reviewed    He will return to clinic in 3 months for post-operative visit with Dr. Singh, or sooner if needed.    All questions and concerns were addressed. Patient verbalizes understanding and has no other questions at this time.     Khloe Adorno-- LILY PETERSEN  Office Phone:  [phone]

## 2025-03-03 NOTE — PROGRESS NOTES
74-year-old male presenting for follow-up on multiple concerns.     HTN  Stable on current medications. Following with nephro.       HLD  Stable, tolerating regimen well     Hypothyroidism  Stable, tolerates current regimen well.  Pending repeat labs.     Gout  Stable on current regimen.     ESRD  Following with nephrology, on dialysis. On transplant list.    BPH  Following with urology, status post greenlight    Thoracic back pain  Has been going on for several months, comes and episodes.  Heating pad somewhat helpful, using frozen peas at times.  Denies any exercise in this muscle group.     12 point ROS reviewed and negative other than as stated in HPI     General: Alert, oriented, pleasant, in no acute distress  HEENT:   Head: normocephalic, atraumatic;    eyes: EOMI, no scleral icterus;   CV: Heart with regular rate and rhythm, normal S1/S2, no murmurs  Lungs: CTAB without wheezing, rhonchi or rales; good respiratory effort, no increased work of breathing  MSK: Follow-up relief with palpation of the thoracic paraspinal musculature  Neuro: Cranial nerves grossly intact; alert and oriented  Psych: Appropriate mood and affect    #HTN  - Above goal in office, generally at more acceptable readings  -Continue labetalol 100 mg 1.5 tabs, amlodipine 5 mg daily, valsartan 160 mg daily, eplerenone 25 mg daily  -Continue to follow nephrology for any changes     # HLD  -Continue rosuvastatin 40 mg daily  -Repeat lipid panel    #Hypothyroidism  -Continue levothyroxine 150 mcg  -Repeat TSH    # Gout  -Continue Uloric 40 mg daily     # ESRD #secondary hyperparathyroidism  -On transplant list  -Continue to follow with nephrology    #BPH  -S/P greenlight  -On Orgovyx, following with urology    #ALLISON  -Compliant with pap therapy    #Back pain  -Appears to be muscle spasm  - Can trial cyclobenzaprine 5 mg as needed, will check with nephrology before taking, though no outright contraindication  - Continue with ice and heat  -  Advised to work on low weight high repetition exercises targeting the labs  - Future considerations include prednisone burst and/or physical therapy    F/U 4 months, Medicare in July     Chris D'Amico, DO

## 2025-03-04 ENCOUNTER — APPOINTMENT (OUTPATIENT)
Dept: UROLOGY | Facility: CLINIC | Age: 75
End: 2025-03-04
Payer: MEDICARE

## 2025-03-04 VITALS — BODY MASS INDEX: 26.66 KG/M2 | HEIGHT: 65 IN | TEMPERATURE: 97.3 F | WEIGHT: 160 LBS

## 2025-03-04 DIAGNOSIS — C61 PROSTATE CANCER (MULTI): ICD-10-CM

## 2025-03-04 DIAGNOSIS — N40.1 BPH ASSOCIATED WITH NOCTURIA: Primary | ICD-10-CM

## 2025-03-04 DIAGNOSIS — E03.9 HYPOTHYROIDISM, UNSPECIFIED TYPE: Primary | ICD-10-CM

## 2025-03-04 DIAGNOSIS — R35.1 BPH ASSOCIATED WITH NOCTURIA: Primary | ICD-10-CM

## 2025-03-04 LAB
T4 FREE SERPL-MCNC: 1.6 NG/DL (ref 0.8–1.8)
TSH SERPL-ACNC: 9.88 MIU/L (ref 0.4–4.5)

## 2025-03-04 ASSESSMENT — PAIN SCALES - GENERAL: PAINLEVEL_OUTOF10: 0-NO PAIN

## 2025-03-04 NOTE — RESULT ENCOUNTER NOTE
TSH is elevated again with normal T4, still representing undertreatment, and has worsened somewhat since last blood draw.  I would recommend increasing medication to 2 tablets 1 day a week, follow-up labs in 4 to 6 weeks.

## 2025-03-05 ENCOUNTER — TELEPHONE (OUTPATIENT)
Dept: PRIMARY CARE | Facility: CLINIC | Age: 75
End: 2025-03-05
Payer: MEDICARE

## 2025-03-05 NOTE — TELEPHONE ENCOUNTER
Result Communication    Resulted Orders   TSH with reflex to Free T4 if abnormal   Result Value Ref Range    TSH W/REFLEX TO FT4 9.88 (H) 0.40 - 4.50 mIU/L    T4, FREE 1.6 0.8 - 1.8 ng/dL    Narrative    FASTING:YES  PATIENT NOT FASTING; ADVISED TO RETURN FOR COLLECTION.    FASTING: YES       10:43 AM      Results were not successfully communicated with the patient and they did not acknowledge their understanding.

## 2025-03-05 NOTE — TELEPHONE ENCOUNTER
----- Message from Christopher D'Amico sent at 3/4/2025 10:48 AM EST -----  TSH is elevated again with normal T4, still representing undertreatment, and has worsened somewhat since last blood draw.  I would recommend increasing medication to 2 tablets 1 day a week, follow-up labs in 4 to 6 weeks.

## 2025-03-12 ENCOUNTER — SPECIALTY PHARMACY (OUTPATIENT)
Dept: PHARMACY | Facility: CLINIC | Age: 75
End: 2025-03-12

## 2025-03-14 ENCOUNTER — PHARMACY VISIT (OUTPATIENT)
Dept: PHARMACY | Facility: CLINIC | Age: 75
End: 2025-03-14
Payer: MEDICARE

## 2025-03-15 LAB
CHOLEST SERPL-MCNC: 140 MG/DL
CHOLEST/HDLC SERPL: 3.3 (CALC)
HDLC SERPL-MCNC: 43 MG/DL
LDLC SERPL CALC-MCNC: 72 MG/DL (CALC)
NONHDLC SERPL-MCNC: 97 MG/DL (CALC)
T4 FREE SERPL-MCNC: 1.4 NG/DL (ref 0.8–1.8)
TRIGL SERPL-MCNC: 171 MG/DL
TSH SERPL-ACNC: 7.56 MIU/L (ref 0.4–4.5)

## 2025-03-18 ENCOUNTER — TELEPHONE (OUTPATIENT)
Dept: PRIMARY CARE | Facility: CLINIC | Age: 75
End: 2025-03-18
Payer: MEDICARE

## 2025-03-18 DIAGNOSIS — E03.9 HYPOTHYROIDISM, UNSPECIFIED TYPE: Primary | ICD-10-CM

## 2025-03-18 NOTE — TELEPHONE ENCOUNTER
----- Message from Christopher D'Amico sent at 3/18/2025  4:20 PM EDT -----  TSH is still a little elevated at 7.56, but T4 within normal limits.  This has been a steady pattern on last few labs, this does indicate slight undertreatment.  I think it would be reasonable to increase levothyroxine to 1 tablet daily, and 2 tablets 1 day a week, followed by repeat labs in 4 to 6 weeks.  I have put in repeat labs.    Triglycerides borderline elevated at 170, continue practice good dietary habits.  Otherwise lipid panel mostly satisfactory.

## 2025-03-18 NOTE — TELEPHONE ENCOUNTER
Result Communication    Resulted Orders   Lipid Panel   Result Value Ref Range    CHOLESTEROL, TOTAL 140 <200 mg/dL    HDL CHOLESTEROL 43 > OR = 40 mg/dL    TRIGLYCERIDES 171 (H) <150 mg/dL    LDL-CHOLESTEROL 72 mg/dL (calc)      Comment:      Reference range: <100     Desirable range <100 mg/dL for primary prevention;    <70 mg/dL for patients with CHD or diabetic patients   with > or = 2 CHD risk factors.     LDL-C is now calculated using the Chrissy   calculation, which is a validated novel method providing   better accuracy than the Friedewald equation in the   estimation of LDL-C.   Zac BARRIENTOS et al. LINSEY. 2013;310(19): 9333-0807   (http://education.[employer]/faq/RXA421)      CHOL/HDLC RATIO 3.3 <5.0 (calc)    NON HDL CHOLESTEROL 97 <130 mg/dL (calc)      Comment:      For patients with diabetes plus 1 major ASCVD risk   factor, treating to a non-HDL-C goal of <100 mg/dL   (LDL-C of <70 mg/dL) is considered a therapeutic   option.      Narrative    FASTING:YES    FASTING: YES   TSH with reflex to Free T4 if abnormal   Result Value Ref Range    TSH W/REFLEX TO FT4 7.56 (H) 0.40 - 4.50 mIU/L    T4, FREE 1.4 0.8 - 1.8 ng/dL    Narrative    FASTING:YES    FASTING: YES       4:26 PM      Results were successfully communicated with the patient and they acknowledged their understanding.

## 2025-03-20 ENCOUNTER — TELEPHONE (OUTPATIENT)
Dept: RADIATION ONCOLOGY | Facility: HOSPITAL | Age: 75
End: 2025-03-20
Payer: MEDICARE

## 2025-03-21 ENCOUNTER — HOSPITAL ENCOUNTER (OUTPATIENT)
Dept: RADIATION ONCOLOGY | Facility: HOSPITAL | Age: 75
Setting detail: RADIATION/ONCOLOGY SERIES
Discharge: HOME | End: 2025-03-21
Payer: MEDICARE

## 2025-03-21 ENCOUNTER — APPOINTMENT (OUTPATIENT)
Dept: RADIATION ONCOLOGY | Facility: HOSPITAL | Age: 75
End: 2025-03-21
Payer: MEDICARE

## 2025-03-21 VITALS
TEMPERATURE: 96.6 F | HEART RATE: 63 BPM | DIASTOLIC BLOOD PRESSURE: 74 MMHG | RESPIRATION RATE: 18 BRPM | OXYGEN SATURATION: 97 % | SYSTOLIC BLOOD PRESSURE: 153 MMHG | WEIGHT: 163.36 LBS | BODY MASS INDEX: 27.18 KG/M2

## 2025-03-21 DIAGNOSIS — C61 PROSTATE CANCER (MULTI): ICD-10-CM

## 2025-03-21 DIAGNOSIS — C61 PROSTATE CANCER (MULTI): Primary | ICD-10-CM

## 2025-03-21 DIAGNOSIS — F40.240 CLAUSTROPHOBIA: ICD-10-CM

## 2025-03-21 PROCEDURE — 99213 OFFICE O/P EST LOW 20 MIN: CPT | Performed by: STUDENT IN AN ORGANIZED HEALTH CARE EDUCATION/TRAINING PROGRAM

## 2025-03-21 PROCEDURE — 77263 THER RADIOLOGY TX PLNG CPLX: CPT | Performed by: STUDENT IN AN ORGANIZED HEALTH CARE EDUCATION/TRAINING PROGRAM

## 2025-03-21 RX ORDER — CEFAZOLIN SODIUM 2 G/100ML
2 INJECTION, SOLUTION INTRAVENOUS ONCE
OUTPATIENT
Start: 2025-03-21 | End: 2025-03-21

## 2025-03-21 RX ORDER — LORAZEPAM 0.5 MG/1
0.5 TABLET ORAL ONCE AS NEEDED
Qty: 2 TABLET | Refills: 0 | Status: SHIPPED | OUTPATIENT
Start: 2025-03-21

## 2025-03-21 ASSESSMENT — ENCOUNTER SYMPTOMS
CARDIOVASCULAR NEGATIVE: 1
EYES NEGATIVE: 1
HOT FLASHES: 0
FATIGUE: 1
HEMATOLOGIC/LYMPHATIC NEGATIVE: 1
SLEEP DISTURBANCE: 1
FLANK PAIN: 1
DIARRHEA: 1
RESPIRATORY NEGATIVE: 1
NEUROLOGICAL NEGATIVE: 1

## 2025-03-21 ASSESSMENT — PAIN SCALES - GENERAL: PAINLEVEL_OUTOF10: 0-NO PAIN

## 2025-03-21 NOTE — PROGRESS NOTES
Radiation Oncology Nursing Note    IPSS (International Prostate Symptom Score):   7 / 35, bother 3  In the past month:   Incomplete Emptying - How often have you had the sensation of not emptying you bladder?   0 (not at all)  Frequency - How often have you had to urinate less than every 2 hours?   0 (not at all)  Intermittency - How often have you found you stopped and started again several times when you urinated?   0 (not at all)  Urgency - How often have you found it difficult to postpone urination?   3 (about half the time)  Weak stream - How often have you had a weak urinary stream?   0 (not at all)  Straining - How often have you had to strain to start urination?   0 (not at all)  Nocturia - How many times did you typically get up at night to urinate?   4 (4 times)  Quality of Life due to Urinary Symptoms  If you were to spend the rest of your life with your urinary condition just the way it is now, how would you feel about that?   3 ( Equally satisfied and dissatisfied )     - He is experiencing very rare urinary incontinence.      - He is not experiencing dysuria, hematuria, flank pain.       Bowel function:    - Denies hematochezia or pain.     Current Systemic Treatment:  Yes, describe: Orgovyx - started 1/14/25     Pain: The patient's current pain level was assessed.  They report currently having a pain of 0 out of 10.  They feel their pain is under control without the use of pain medications.    Review of Systems:  Review of Systems   Constitutional:  Positive for fatigue (moderate fatgiue, low endurance).   HENT:  Negative.     Eyes: Negative.    Respiratory: Negative.     Cardiovascular: Negative.    Gastrointestinal:  Positive for diarrhea (occasional - not constant).   Endocrine: Negative for hot flashes.   Genitourinary:  Positive for nocturia (x4).         Urgency half the time   Musculoskeletal:  Positive for flank pain (left flank - mucle pain, on muscle relaxers).   Skin:  Positive for itching  (lower legs - has cream).   Neurological: Negative.    Hematological: Negative.    Psychiatric/Behavioral:  Positive for sleep disturbance (urinating).

## 2025-03-21 NOTE — PROGRESS NOTES
Staff Physician: Milly Betancourt MD PhD  Date of Service: 3/21/2025  Patient name: Salazar Jesus   MRN: [id number]    RADIATION ONCOLOGY FOLLOW UP NOTE    IDENTIFYING DATA:  DIAGNOSIS: Newly diagnosed, localized   Cancer Staging   Prostate cancer (Multi)  Staging form: Prostate, AJCC 8th Edition  - Clinical stage from 10/15/2024: cT3b, cN0, PSA: 5.7, Grade Group: 3 - Signed by Milly Betancourt MD PhD on 12/10/2024  Prognostic indicators: Decipher 0.87 (high)    DISEASE STATE: No prior treatment  DISEASE STATUS: Treatment pending work-up  Problem List Items Addressed This Visit       Prostate cancer (Multi) - Primary    Relevant Orders    Prostate Specific Antigen, Screen    Testosterone,Total, LC-MS/MS     He was last seen in Radiation Oncology on 1/7/2025 and returns today for routine follow-up.    Oncologic History:  6/8/2022 PSA 3.38  8/3/2023 PSA 4.4  8/21/2024 PSA 5.7  9/6/2024 MRI prostate without gadolinium noted a 25.9 g gland, PI-RADS 5 lesion involving right PZ, extending base to mid gland, extending into the right SV, broadly abutting capsule concerning for EPE.  No abnormally enlarged lymph nodes.  10/15/2024 systematic and targeted biopsy noted GG3 disease, left anterior with intraductal carcinoma,, 9/14 cores, 1/1 targeted core that was GG 2.  Decipher 0.87  12/10/2024 PSMA PET/CT noted uptake within the prostate, SUV 6.4, corresponding to the PI-RADS 5 lesion. No abnormal uptake in nodes or distant sites.    Interval History:  1/14/2025 Orgovyx day 1  2/27/2025 greenlight photo selective vaporization of prostate by Dr. Singh    Today, he is accompanied by his wife. IPSS of 7 (previously 12). Denies caffeine beverages after 3pm, but does drink beer in the evening. Reports nocturia every 1-2h at night has not changed since PVP, though flow much improved. Reports his nocturia is not particularly bothersome, that he is typically able to go back to sleep.     A 10 point review of systems was reviewed  with pertinent positives and negatives noted in HPI. All other systems have been reviewed and are negative.    PERFORMANCE STATUS:  Karnofsky Performance Score/ECO, Normal activity with effort; some signs or symptoms of disease (ECOG equivalent 1)    PHYSICAL EXAMINATION:  /74   Pulse 63   Temp 35.9 °C (96.6 °F) (Temporal)   Resp 18   Wt 74.1 kg (163 lb 5.8 oz)   SpO2 97%   BMI 27.18 kg/m²   Constitutional: well developed, no distress, alert & oriented, cooperative  Eyes: pupils equal round and reactive to light, extraocular movements intact  Respiratory: normal work of breathing    CTCAE (v5) ADVERSE EVENTS:  Toxicity Assessment          3/21/2025    11:00   Toxicity Assessment   Urinary Incontinence Grade 0   Urinary Frequency Grade 1       q1-2h nocutria   Urinary Retention Grade 3       s/p greenlight PVP (25)   Urinary Urgency Grade 1     DIAGNOSTIC REPORTS REVIEWED:  Imaging: All imaging was personally reviewed and interpreted in clinic. Findings as per interval history and EMR.  Laboratory/Pathology:  All pertinent labs and pathology were personally reviewed and interpreted in clinic. Findings as per interval history and EMR.  Lab Results   Component Value Date    PSA 5.7 (H) 2024    PSA 4.40 (H) 2023    PSA 3.38 2022    PSA 3.41 2021    PSASCREEN 4.85 (H) 2025     Lab Results   Component Value Date    TESTOSTERONE 242 2025     Lab Results   Component Value Date    BUN 23 2025    CREATININE 4.45 (H) 2025    EGFR 13 (L) 2025     2025    K 4.3 2025    CL 96 (L) 2025    CO2 32 2025    CALCIUM 9.8 2025    HGBA1C 4.4 2024      IMPRESSION:  74-year-old gentleman with newly diagnosed prostate adenocarcinoma, PSA 5.7, cT3b (by MRI), GG3 ( cores+,  targeted  core+, intraductal carcinoma+, Decipher 0.87), cN0M0 by PSMA PET, enrolled on NRG , planned for 18m ADT (Orgovyx, 25) and  definitive radiation.     He is recovering well from greenlight PVP (2/27/25). Plan for spaceOAR and fiducial in May, radiation treatment early June. Discussed bladder filling and bowel regimen. Discussed limiting fluid intake prior to bed, and avoiding caffeine and alcohol later in the day to decrease nocturia.      PLAN:  -check labs today  -will randomize for  in April (within 3m of starting RT)  -we will coordinate spaceOAR + fiducial placement with Dr. Nunes, followed by  CT-sim and MR prostate (treatment planning) at [facility]   -ativan 0.5mg (2 tablets) prescribed for claustrophobia during MRI  -radiation fractions pending if enrolling on , treat at [facility]     He knows to call with any questions or concerns in the interim.    Milly Betancourt MD PhD  , Radiation Oncology

## 2025-03-24 ENCOUNTER — TELEPHONE (OUTPATIENT)
Dept: UROLOGY | Facility: CLINIC | Age: 75
End: 2025-03-24
Payer: MEDICARE

## 2025-04-02 LAB
T4 FREE SERPL-MCNC: 1.4 NG/DL (ref 0.8–1.8)
TSH SERPL-ACNC: 12.56 MIU/L (ref 0.4–4.5)

## 2025-04-03 LAB
ANION GAP SERPL CALCULATED.4IONS-SCNC: 16 MMOL/L (CALC) (ref 7–17)
BACTERIA UR CULT: NORMAL
BUN SERPL-MCNC: 22 MG/DL (ref 7–25)
BUN/CREAT SERPL: 4 (CALC) (ref 6–22)
CALCIUM SERPL-MCNC: 9.9 MG/DL (ref 8.6–10.3)
CHLORIDE SERPL-SCNC: 97 MMOL/L (ref 98–110)
CO2 SERPL-SCNC: 28 MMOL/L (ref 20–32)
CREAT SERPL-MCNC: 5.23 MG/DL (ref 0.7–1.28)
EGFRCR SERPLBLD CKD-EPI 2021: 11 ML/MIN/1.73M2
ERYTHROCYTE [DISTWIDTH] IN BLOOD BY AUTOMATED COUNT: 14.7 % (ref 11–15)
GLUCOSE SERPL-MCNC: 88 MG/DL (ref 65–139)
HCT VFR BLD AUTO: 33.4 % (ref 38.5–50)
HGB BLD-MCNC: 11.4 G/DL (ref 13.2–17.1)
INR PPP: 0.9
MCH RBC QN AUTO: 33.8 PG (ref 27–33)
MCHC RBC AUTO-ENTMCNC: 34.1 G/DL (ref 32–36)
MCV RBC AUTO: 99.1 FL (ref 80–100)
PLATELET # BLD AUTO: 124 THOUSAND/UL (ref 140–400)
PMV BLD REES-ECKER: 11.3 FL (ref 7.5–12.5)
POTASSIUM SERPL-SCNC: 4.2 MMOL/L (ref 3.5–5.3)
PROTHROMBIN TIME: 10.2 SEC (ref 9–11.5)
RBC # BLD AUTO: 3.37 MILLION/UL (ref 4.2–5.8)
SODIUM SERPL-SCNC: 141 MMOL/L (ref 135–146)
WBC # BLD AUTO: 4.9 THOUSAND/UL (ref 3.8–10.8)

## 2025-04-07 ENCOUNTER — TELEPHONE (OUTPATIENT)
Dept: UROLOGY | Facility: CLINIC | Age: 75
End: 2025-04-07
Payer: MEDICARE

## 2025-04-08 ENCOUNTER — TELEPHONE (OUTPATIENT)
Dept: PRIMARY CARE | Facility: CLINIC | Age: 75
End: 2025-04-08
Payer: MEDICARE

## 2025-04-08 NOTE — TELEPHONE ENCOUNTER
----- Message from Christopher D'Amico sent at 4/2/2025  1:23 PM EDT -----  TSH is actually a little higher showing inadequate treatment that is actually worsened from previous labs.  Did he increase his regimen to 2 tablets 1 day a week and 1 tablet the other days?  If so, I would increase to 2 tablets 2 days a week and 1 tablet the remaining days, with repeat lab in 4-6 weeks

## 2025-04-08 NOTE — TELEPHONE ENCOUNTER
Result Communication    Resulted Orders   TSH with reflex to Free T4 if abnormal   Result Value Ref Range    TSH W/REFLEX TO FT4 12.56 (H) 0.40 - 4.50 mIU/L    T4, FREE 1.4 0.8 - 1.8 ng/dL    Narrative    FASTING:NO    FASTING: NO       3:04 PM      Results were successfully communicated with the patient and they acknowledged their understanding.

## 2025-04-09 ENCOUNTER — SPECIALTY PHARMACY (OUTPATIENT)
Dept: PHARMACY | Facility: CLINIC | Age: 75
End: 2025-04-09

## 2025-04-09 ENCOUNTER — CLINICAL SUPPORT (OUTPATIENT)
Dept: PREADMISSION TESTING | Facility: HOSPITAL | Age: 75
End: 2025-04-09
Payer: MEDICARE

## 2025-04-09 VITALS — HEIGHT: 65 IN | BODY MASS INDEX: 26.66 KG/M2 | WEIGHT: 160 LBS

## 2025-04-09 PROCEDURE — RXMED WILLOW AMBULATORY MEDICATION CHARGE

## 2025-04-09 RX ORDER — ALFUZOSIN HYDROCHLORIDE 10 MG/1
10 TABLET, EXTENDED RELEASE ORAL DAILY
COMMUNITY

## 2025-04-09 RX ORDER — HYDROGEN PEROXIDE 3 %
20 SOLUTION, NON-ORAL MISCELLANEOUS
COMMUNITY

## 2025-04-09 ASSESSMENT — ENCOUNTER SYMPTOMS
WOUND: 0
PALPITATIONS: 0
COUGH: 0
WHEEZING: 0
FEVER: 0
CHILLS: 0
ABDOMINAL PAIN: 0
MYALGIAS: 0
WEAKNESS: 0
SHORTNESS OF BREATH: 0

## 2025-04-09 NOTE — PERIOPERATIVE NURSING NOTE
Spoke with patient and completed RN PAT screening call. Discussed PAT and education. Chart updated per information provided by patient. Patient had no further questions at this time. In person PAT appointment is scheduled for patient.  Patient aware of appointment

## 2025-04-09 NOTE — CPM/PAT H&P
CPM/PAT Evaluation       Name: Salazar Jesus (Salazar Jesus)  /Age: 1950/74 y.o.     {[facility] Visit Type:27354}      Chief Complaint: ***    HPI    Past Medical History:   Diagnosis Date    BPH (benign prostatic hyperplasia)     Cataract     Chronic kidney disease     ESRD - Dialysis 3 x a week    Dental crown present     loose; on upper left incisor    ESRD (end stage renal disease) on dialysis (Multi)     Dialysis MWF    GERD (gastroesophageal reflux disease)     Gout     Hemodialysis status     Lt arm fistula    HL (hearing loss)     HTN (hypertension)     Hyperlipidemia     Hypothyroidism     ALLISON on CPAP        Past Surgical History:   Procedure Laterality Date    CATARACT EXTRACTION      KNEE CARTILAGE SURGERY Left     Knee surgery    ROTATOR CUFF REPAIR Right 2017    THROAT SURGERY      w/ broken jaw    WISDOM TOOTH EXTRACTION Bilateral        Patient Sexual activity questions deferred to the physician.    Family History   Problem Relation Name Age of Onset    Heart disease Mother      Stroke Father      Parkinsonism Brother         Allergies   Allergen Reactions    Allopurinol Itching       Prior to Admission medications    Medication Sig Start Date End Date Taking? Authorizing Provider   alfuzosin (Uroxatral) 10 mg 24 hr tablet Take 1 tablet (10 mg) by mouth once daily. Do not crush, chew, or split.   Yes Historical Provider, MD   amLODIPine (Norvasc) 10 mg tablet Take 1 tablet (10 mg) by mouth once daily. 3/3/25  Yes Christopher D'Amico, DO   doxazosin (Cardura) 1 mg tablet Take 1 tablet (1 mg) by mouth once daily at bedtime.   Yes Historical Provider, MD   esomeprazole (NexIUM) 20 mg DR capsule Take 1 capsule (20 mg) by mouth once daily in the morning. Take before meals. Do not open capsule.   Yes Historical Provider, MD   febuxostat (Uloric) 40 mg tablet Take 1 tablet (40 mg) by mouth once daily. 3/3/25 8/30/25 Yes Christopher D'Amico, DO   hydrocortisone 2.5 % cream once daily as  needed for rash. 8/25/21  Yes Historical Provider, MD   labetalol (Normodyne) 100 mg tablet Take 1.5 tablets (150 mg) by mouth 2 times a day. 3/3/25 8/30/25 Yes Christopher D'Amico, DO   levothyroxine (Synthroid, Levoxyl) 150 mcg tablet Take 1 tablet (150 mcg) by mouth once daily in the morning. Take before meals.  Patient taking differently: Take 1 tablet (150 mcg) by mouth once daily in the morning. Take before meals. 2 doses on Sunday and Wednesday 3/3/25  Yes Christopher D'Amico, DO   rosuvastatin (Crestor) 40 mg tablet Take 1 tablet (40 mg) by mouth once daily. 3/3/25 8/30/25 Yes Christopher D'Amico, DO   sevelamer carbonate (Renvela) 800 mg tablet Take 1 tablet (800 mg) by mouth 3 times daily (morning, midday, late afternoon). Swallow tablet whole; do not crush, break, or chew.   Yes Historical Provider, MD   sodium bicarbonate 650 mg tablet Take 1 tablet (650 mg) by mouth 2 times a day. 2/4/25 5/5/25 Yes Christopher D'Amico, DO   aspirin 81 mg EC tablet Take 1 tablet (81 mg) by mouth once daily.  Patient not taking: Reported on 4/9/2025    Historical Provider, MD   cyclobenzaprine (Flexeril) 5 mg tablet Take 1 tablet (5 mg) by mouth as needed at bedtime for muscle spasms.  Patient not taking: Reported on 4/9/2025 3/3/25 5/2/25  Christopher D'Amico, DO   LORazepam (Ativan) 0.5 mg tablet Take 1 tablet (0.5 mg) by mouth 1 time if needed for anxiety for up to 2 doses. 3/21/25   Milly Betancourt MD PhD   relugolix (Orgovyx) 120 mg tablet Take 1 tablet (120 mg total) by mouth once daily.  If more than 7 days in a row are missed, take 3 tablets by mouth once before going back to 1 tablet per day  Patient not taking: Reported on 4/9/2025 1/6/25 5/31/26  Milly Betancourt MD PhD   Vitamin D3 50 mcg (2,000 unit) capsule TAKE 1 CAPSULE BY MOUTH EVERY DAY  Patient not taking: Reported on 4/9/2025 1/30/24   Christopher D'Amico, DO PAT ROS:   Constitutional:    no fever   no chills  Neuro/Psych:    no weakness  Eyes:  "  Ears:    hearing loss   no hearing aides  Nose:   Mouth:   Throat:    no throat pain  Neck:   Cardio:    no chest pain   no palpitations  Respiratory:    no cough   no wheezing   no shortness of breath  Endocrine:   GI:    no abdominal pain  :   Musculoskeletal:    no myalgias  Hematologic:    no history of blood transfusion   no blood clots  Skin:   no sores/wound   no rash      PAT Physical Exam     Airway    Testing/Diagnostic:     Patient Specialist/PCP:     Visit Vitals  Ht 1.651 m (5' 5\") Comment: stated   Wt 72.6 kg (160 lb) Comment: stated   BMI 26.63 kg/m²   Smoking Status Former   BSA 1.82 m²       DASI Risk Score      Flowsheet Row Pre-Admission Testing from 2/6/2025 in [facility] Pre-Admission Testing from 10/3/2024 in [facility]   Can you take care of yourself (eat, dress, bathe, or use toilet)?  2.75 filed at 02/06/2025 1202 2.75 filed at 10/03/2024 1105   Can you walk indoors, such as around your house? 1.75 filed at 02/06/2025 1202 1.75 filed at 10/03/2024 1105   Can you walk a block or two on level ground?  2.75 filed at 02/06/2025 1202 2.75 filed at 10/03/2024 1105   Can you climb a flight of stairs or walk up a hill? 5.5 filed at 02/06/2025 1202 5.5 filed at 10/03/2024 1105   Can you run a short distance? 8 filed at 02/06/2025 1202 8 filed at 10/03/2024 1105   Can you do light work around the house like dusting or washing dishes? 2.7 filed at 02/06/2025 1202 2.7 filed at 10/03/2024 1105   Can you do moderate work around the house like vacuuming, sweeping floors or carrying groceries? 3.5 filed at 02/06/2025 1202 3.5 filed at 10/03/2024 1105   Can you do heavy work around the house like scrubbing floors or lifting and moving heavy furniture?  8 filed at 02/06/2025 1202 8 filed at 10/03/2024 1105   Can you do yard work like raking leaves, weeding or pushing a mower? 4.5 filed at 02/06/2025 1202 4.5 filed at 10/03/2024 1105   Can you have sexual relations? 5.25 " filed at 02/06/2025 1202 5.25 filed at 10/03/2024 1105   Can you participate in moderate recreational activities like golf, bowling, dancing, doubles tennis or throwing a baseball or football? 0 filed at 02/06/2025 1202 6 filed at 10/03/2024 1105   Can you participate in strenous sports like swimming, singles tennis, football, basketball, or skiing? 0 filed at 02/06/2025 1202 0 filed at 10/03/2024 1105   DASI SCORE 44.7 filed at 02/06/2025 1202 50.7 filed at 10/03/2024 1105   METS Score (Will be calculated only when all the questions are answered) 8.2 filed at 02/06/2025 1202 9 filed at 10/03/2024 1105          Caprini DVT Assessment      Flowsheet Row Pre-Admission Testing from 2/6/2025 in [facility] Pre-Admission Testing from 10/3/2024 in [facility]   DVT Score (IF A SCORE IS NOT CALCULATING, MUST SELECT A BMI TO COMPLETE) 8 filed at 02/06/2025 1200 5 filed at 10/03/2024 1104   Medical Factors Present cancer, chemotherapy, or previous malignancy filed at 02/06/2025 1200 --   Surgical Factors Major surgery planned, including arthroscopic and laproscopic (1-2 hours) filed at 02/06/2025 1200 Minor surgery planned filed at 10/03/2024 1104   BMI (BMI MUST BE CHOSEN) 30 or less filed at 02/06/2025 1200 30 or less filed at 10/03/2024 1104          Modified Frailty Index      Flowsheet Row Pre-Admission Testing from 10/3/2024 in [facility]   Non-independent functional status (problems with dressing, bathing, personal grooming, or cooking) 0 filed at 10/03/2024 1106   History of diabetes mellitus  0 filed at 10/03/2024 1106   History of COPD 0 filed at 10/03/2024 1106   History of CHF No filed at 10/03/2024 1106   History of MI 0 filed at 10/03/2024 1106   History of Percutaneous Coronary Intervention, Cardiac Surgery, or Angina No filed at 10/03/2024 1106   Hypertension requiring the use of medication  0.0909 filed at 10/03/2024 1106   Peripheral vascular disease 0 filed  at 10/03/2024 1106   Impaired sensorium (cognitive impairement or loss, clouding, or delirium) 0 filed at 10/03/2024 1106   TIA or CVA withouy residual deficit 0 filed at 10/03/2024 1106   Cerebrovascular accident with deficit 0 filed at 10/03/2024 1106   Modified Frailty Index Calculator .0909 filed at 10/03/2024 1106          [id number] Stroke Risk Points  Current as of just now        N/A 0 to 9 Points:      Last Change: N/A          The [id number] risk score (Lip WALI, et al. 2009. © 2010 [employer]) quantifies the risk of stroke for a patient with atrial fibrillation. For patients without atrial fibrillation or under the age of 18 this score appears as N/A. Higher score values generally indicate higher risk of stroke.        This score is not applicable to this patient. Components are not calculated.          Revised Cardiac Risk Index      Flowsheet Row Pre-Admission Testing from 2/6/2025 in [facility] Pre-Admission Testing from 10/3/2024 in [facility]   High-Risk Surgery (Intraperitoneal, Intrathoracic,Suprainguinal vascular) 0 filed at 02/06/2025 1203 0 filed at 10/03/2024 1106   History of ischemic heart disease (History of MI, History of positive exercuse test, Current chest paint considered due to myocardial ischemia, Use of nitrate therapy, ECG with pathological Q Waves) 0 filed at 02/06/2025 1203 0 filed at 10/03/2024 1106   History of congestive heart failure (pulmonary edemia, bilateral rales or S3 gallop, Paroxysmal nocturnal dyspnea, CXR showing pulmonary vascular redistribution) 0 filed at 02/06/2025 1203 0 filed at 10/03/2024 1106   History of cerebrovascular disease (Prior TIA or stroke) 0 filed at 02/06/2025 1203 0 filed at 10/03/2024 1106   Pre-operative insulin treatment 0 filed at 02/06/2025 1203 0 filed at 10/03/2024 1106   Pre-operative creatinine>2 mg/dl 1 filed at 02/06/2025 1203 1 filed at 10/03/2024 1106   Revised Cardiac  Risk Calculator 1 filed at 02/06/2025 1203 1 filed at 10/03/2024 1106          Apfel Simplified Score      Flowsheet Row Pre-Admission Testing from 10/3/2024 in [facility]   Smoking status 1 filed at 10/03/2024 1106   History of motion sickness or PONV  0 filed at 10/03/2024 1106   Use of postoperative opioids 0 filed at 10/03/2024 1106   Gender - Female 0=No filed at 10/03/2024 1106   Apfel Simplified Score Calculator 1 filed at 10/03/2024 1106          Risk Analysis Index Results This Encounter    No data found in the last 10 encounters.       Stop Bang Score      Flowsheet Row Clinical Support from 4/9/2025 in [facility] Admission (Discharged) from 2/27/2025 in [facility] OR with Rosemary Singh MD   Do you snore loudly? 1 filed at 04/09/2025 0954 1 filed at 02/27/2025 1154   Do you often feel tired or fatigued after your sleep? 1 filed at 04/09/2025 0954 1 filed at 02/27/2025 1154   Has anyone ever observed you stop breathing in your sleep? 1 filed at 04/09/2025 0954 1 filed at 02/27/2025 1154   Do you have or are you being treated for high blood pressure? 1 filed at 04/09/2025 0954 1 filed at 02/27/2025 1154   Recent BMI (Calculated) 26.6 filed at 04/09/2025 0954 26.8 filed at 02/27/2025 1154   Is BMI greater than 35 kg/m2? 0=No filed at 04/09/2025 0954 0=No filed at 02/27/2025 1154   Age older than 50 years old? 1=Yes filed at 04/09/2025 0954 1=Yes filed at 02/27/2025 1154   Gender - Male 1=Yes filed at 04/09/2025 0954 1=Yes filed at 02/27/2025 1154          Prodigy: High Risk  Total Score: 20              Prodigy Age Score      Prodigy Gender Score          ARISCAT Score for Postoperative Pulmonary Complications      Flowsheet Row Pre-Admission Testing from 2/6/2025 in [facility]   Age Calculated Score 3 filed at 02/06/2025 1203   Preoperative SpO2 0 filed at 02/06/2025 1203   Respiratory infection in the last month Either upper or lower (i.e.,  URI, bronchitis, pneumonia), with fever and antibiotic treatment 0 filed at 02/06/2025 1203   Preoperative anemia (Hgb less than 10 g/dl) 0 filed at 02/06/2025 1203   Surgical incision  0 filed at 02/06/2025 1203   Duration of surgery  0 filed at 02/06/2025 1203   Emergency Procedure  0 filed at 02/06/2025 1203   ARISCAT Total Score  3 filed at 02/06/2025 1203          Yoanna Perioperative Risk for Myocardial Infarction or Cardiac Arrest (KIMBERLEY)      Flowsheet Row Pre-Admission Testing from 2/6/2025 in [facility]   Calculated Age Score 1.52 filed at 02/06/2025 1203   Functional Status  0 filed at 02/06/2025 1203   ASA Class  -1.92 filed at 02/06/2025 1203   Creatinine 0.61 filed at 02/06/2025 1203   Type of Procedure  -0.26 filed at 02/06/2025 1203   KIMBERLEY Total Score  -5.3 filed at 02/06/2025 1203   KIMBERLEY % 0.5 filed at 02/06/2025 1203            Assessment and Plan:     {[facility] EMBEDDED ASSESSMENT AND PLAN:[id number]}

## 2025-04-17 ENCOUNTER — PHARMACY VISIT (OUTPATIENT)
Dept: PHARMACY | Facility: CLINIC | Age: 75
End: 2025-04-17
Payer: MEDICARE

## 2025-04-28 DIAGNOSIS — E03.9 HYPOTHYROIDISM, UNSPECIFIED TYPE: ICD-10-CM

## 2025-04-28 RX ORDER — LEVOTHYROXINE SODIUM 150 UG/1
150 TABLET ORAL
Qty: 90 TABLET | Refills: 1 | Status: CANCELLED | OUTPATIENT
Start: 2025-04-28

## 2025-04-29 ENCOUNTER — APPOINTMENT (OUTPATIENT)
Dept: ORTHOPEDIC SURGERY | Facility: CLINIC | Age: 75
End: 2025-04-29
Payer: MEDICARE

## 2025-04-29 ENCOUNTER — LAB (OUTPATIENT)
Dept: LAB | Facility: HOSPITAL | Age: 75
End: 2025-04-29
Payer: MEDICARE

## 2025-04-29 ENCOUNTER — PRE-ADMISSION TESTING (OUTPATIENT)
Dept: PREADMISSION TESTING | Facility: HOSPITAL | Age: 75
End: 2025-04-29
Payer: MEDICARE

## 2025-04-29 VITALS
SYSTOLIC BLOOD PRESSURE: 126 MMHG | TEMPERATURE: 98.1 F | BODY MASS INDEX: 26.66 KG/M2 | HEIGHT: 65 IN | RESPIRATION RATE: 16 BRPM | OXYGEN SATURATION: 97 % | WEIGHT: 160 LBS | HEART RATE: 61 BPM | DIASTOLIC BLOOD PRESSURE: 62 MMHG

## 2025-04-29 DIAGNOSIS — M72.0 DUPUYTREN'S DISEASE OF PALM OF RIGHT HAND: Primary | ICD-10-CM

## 2025-04-29 DIAGNOSIS — R94.6 ABNORMAL RESULTS OF THYROID FUNCTION STUDIES: ICD-10-CM

## 2025-04-29 DIAGNOSIS — Z01.818 ENCOUNTER FOR OTHER PREPROCEDURAL EXAMINATION: Primary | ICD-10-CM

## 2025-04-29 DIAGNOSIS — Z01.818 PREOP TESTING: Primary | ICD-10-CM

## 2025-04-29 LAB
ANION GAP SERPL CALC-SCNC: 15 MMOL/L (ref 10–20)
ATRIAL RATE: 62 BPM
BUN SERPL-MCNC: 23 MG/DL (ref 6–23)
CALCIUM SERPL-MCNC: 10.2 MG/DL (ref 8.6–10.3)
CHLORIDE SERPL-SCNC: 96 MMOL/L (ref 98–107)
CO2 SERPL-SCNC: 31 MMOL/L (ref 21–32)
CREAT SERPL-MCNC: 4.73 MG/DL (ref 0.5–1.3)
EGFRCR SERPLBLD CKD-EPI 2021: 12 ML/MIN/1.73M*2
GLUCOSE SERPL-MCNC: 90 MG/DL (ref 74–99)
P AXIS: -1 DEGREES
P OFFSET: 172 MS
P ONSET: 126 MS
POTASSIUM SERPL-SCNC: 4.3 MMOL/L (ref 3.5–5.3)
PR INTERVAL: 174 MS
Q ONSET: 213 MS
QRS COUNT: 10 BEATS
QRS DURATION: 86 MS
QT INTERVAL: 454 MS
QTC CALCULATION(BAZETT): 460 MS
QTC FREDERICIA: 459 MS
R AXIS: -53 DEGREES
SODIUM SERPL-SCNC: 138 MMOL/L (ref 136–145)
T AXIS: 21 DEGREES
T OFFSET: 440 MS
TSH SERPL-ACNC: 4.35 MIU/L (ref 0.44–3.98)
VENTRICULAR RATE: 62 BPM

## 2025-04-29 PROCEDURE — 84439 ASSAY OF FREE THYROXINE: CPT

## 2025-04-29 PROCEDURE — 99214 OFFICE O/P EST MOD 30 MIN: CPT | Performed by: PHYSICIAN ASSISTANT

## 2025-04-29 PROCEDURE — 1036F TOBACCO NON-USER: CPT | Performed by: ORTHOPAEDIC SURGERY

## 2025-04-29 PROCEDURE — 1159F MED LIST DOCD IN RCRD: CPT | Performed by: ORTHOPAEDIC SURGERY

## 2025-04-29 PROCEDURE — 80048 BASIC METABOLIC PNL TOTAL CA: CPT

## 2025-04-29 PROCEDURE — 36415 COLL VENOUS BLD VENIPUNCTURE: CPT

## 2025-04-29 PROCEDURE — 99203 OFFICE O/P NEW LOW 30 MIN: CPT | Performed by: ORTHOPAEDIC SURGERY

## 2025-04-29 PROCEDURE — 93005 ELECTROCARDIOGRAM TRACING: CPT

## 2025-04-29 PROCEDURE — 1160F RVW MEDS BY RX/DR IN RCRD: CPT | Performed by: ORTHOPAEDIC SURGERY

## 2025-04-29 PROCEDURE — 93010 ELECTROCARDIOGRAM REPORT: CPT | Performed by: INTERNAL MEDICINE

## 2025-04-29 PROCEDURE — 84443 ASSAY THYROID STIM HORMONE: CPT

## 2025-04-29 RX ORDER — LEVOTHYROXINE SODIUM 150 UG/1
TABLET ORAL
Qty: 108 TABLET | Refills: 1 | Status: SHIPPED | OUTPATIENT
Start: 2025-04-29 | End: 2025-05-02 | Stop reason: ALTCHOICE

## 2025-04-29 ASSESSMENT — ENCOUNTER SYMPTOMS
CONSTITUTIONAL NEGATIVE: 1
GASTROINTESTINAL NEGATIVE: 1
EYES NEGATIVE: 1
ENDOCRINE NEGATIVE: 1
NEUROLOGICAL NEGATIVE: 1
NECK NEGATIVE: 1
MUSCULOSKELETAL NEGATIVE: 1
RESPIRATORY NEGATIVE: 1
CARDIOVASCULAR NEGATIVE: 1

## 2025-04-29 ASSESSMENT — PAIN SCALES - GENERAL: PAINLEVEL_OUTOF10: 0 - NO PAIN

## 2025-04-29 ASSESSMENT — PAIN - FUNCTIONAL ASSESSMENT: PAIN_FUNCTIONAL_ASSESSMENT: 0-10

## 2025-04-29 NOTE — PREPROCEDURE INSTRUCTIONS
Medication List            Accurate as of April 29, 2025 10:45 AM. Always use your most recent med list.                alfuzosin 10 mg 24 hr tablet  Commonly known as: Uroxatral  Medication Adjustments for Surgery: Take/Use as prescribed     amLODIPine 10 mg tablet  Commonly known as: Norvasc  Take 1 tablet (10 mg) by mouth once daily.  Medication Adjustments for Surgery: Take/Use as prescribed     aspirin 81 mg EC tablet  Additional Medication Adjustments for Surgery: Other (Comment)  Notes to patient: PATIENT IS NOT TAKING     cyclobenzaprine 5 mg tablet  Commonly known as: Flexeril  Take 1 tablet (5 mg) by mouth as needed at bedtime for muscle spasms.  Medication Adjustments for Surgery: Take/Use as prescribed  Notes to patient: PATIENT IS NOT TAKING     doxazosin 1 mg tablet  Commonly known as: Cardura  Medication Adjustments for Surgery: Take/Use as prescribed     esomeprazole 20 mg DR capsule  Commonly known as: NexIUM  Medication Adjustments for Surgery: Take/Use as prescribed     febuxostat 40 mg tablet  Commonly known as: Uloric  Take 1 tablet (40 mg) by mouth once daily.  Medication Adjustments for Surgery: Take/Use as prescribed     hydrocortisone 2.5 % cream  Medication Adjustments for Surgery: Do Not take on the morning of surgery     labetalol 100 mg tablet  Commonly known as: Normodyne  Take 1.5 tablets (150 mg) by mouth 2 times a day.  Medication Adjustments for Surgery: Take/Use as prescribed     levothyroxine 150 mcg tablet  Commonly known as: Synthroid, Levoxyl  Take 1 tablet (150 mcg) by mouth once daily in the morning. Take before meals.  Medication Adjustments for Surgery: Take/Use as prescribed     LORazepam 0.5 mg tablet  Commonly known as: Ativan  Take 1 tablet (0.5 mg) by mouth 1 time if needed for anxiety for up to 2 doses.  Medication Adjustments for Surgery: Take/Use as prescribed     Orgovyx 120 mg tablet  Generic drug: relugolix  Take 1 tablet (120 mg total) by mouth once daily.   If more than 7 days in a row are missed, take 3 tablets by mouth once before going back to 1 tablet per day  Medication Adjustments for Surgery: Take/Use as prescribed     rosuvastatin 40 mg tablet  Commonly known as: Crestor  Take 1 tablet (40 mg) by mouth once daily.  Medication Adjustments for Surgery: Take/Use as prescribed     sevelamer carbonate 800 mg tablet  Commonly known as: Renvela  Medication Adjustments for Surgery: Take/Use as prescribed  Notes to patient: PATIENT IS NOT TAKING     sodium bicarbonate 650 mg tablet  Take 1 tablet (650 mg) by mouth 2 times a day.  Medication Adjustments for Surgery: Take/Use as prescribed     Vitamin D3 50 mcg (2,000 unit) capsule  Generic drug: cholecalciferol  TAKE 1 CAPSULE BY MOUTH EVERY DAY  Additional Medication Adjustments for Surgery: Take last dose 7 days before surgery  Notes to patient: PATIENT IS NOT TAKING; last dose 4/28/25                      Thank you for visiting [facility].  If you have any changes to your health condition, please call the surgeons office to alert them and give them details of your symptoms.       CONTACT SURGEON'S OFFICE IF YOU DEVELOP:  * Fever = 100.4 F   * New respiratory symptoms (e.g. cough, shortness of breath, respiratory distress, sore throat)  * Recent loss of taste or smell  *Flu like symptoms such as headache, fatigue or gastrointestinal symptoms  * You develop any open sores, shingles, burning or painful urination   AND/OR:  * You no longer wish to have the surgery.  * Any other personal circumstances change that may lead to the need to cancel or defer this surgery.  *You were admitted to any hospital within one week of your planned procedure.     SMOKING:  *Quitting smoking can make a huge difference to your health and recovery from surgery.    *If you need help with quitting, call 5-582-QUIT-NOW.     SURGICAL TIME:  *You will be contacted between 2 p.m. and 3 p.m. the business day before your surgery  with your arrival time.  *If you haven't received a call by 3pm, call [phone]  *Scheduled surgery times may change and you will be notified if this occurs-check your personal voicemail for any updates.     ON THE MORNING OF SURGERY:  *Wear comfortable, loose fitting clothing.   *Do not use moisturizers, creams, lotions or perfume.  *All jewelry and valuables should be left at home.  *Prosthetic devices such as contact lenses, hearing aids, dentures, eyelash extensions, hairpins and body piercing must be removed before surgery.     BRING WITH YOU:  *Photo ID and insurance card  *Current list of medications and allergies  *Pacemaker/Defibrillator/Heart stent cards  *CPAP machine and mask  *Slings/splints/crutches  *Copy of your complete Advanced Directive/DHPOA-if applicable  *Neurostimulator implant remote     PARKING AND ARRIVAL:  *Check in at the Main Entrance desk and let them know you are here for surgery.     IF YOU ARE HAVING OUTPATIENT/SAME DAY SURGERY:  *A responsible adult MUST accompany you at the time of discharge and stay with you for 24 hours after your surgery.  *You may NOT drive yourself home after surgery.  *You may use a taxi or ride sharing service ([employer], Uber) to return home ONLY if you are accompanied by a friend or family member.  *Instructions for resuming your medications will be provided by your surgeon.        Viri Dorman PA-C  P: [phone]  Department of Anesthesiology and Perioperative Medicine  --      Preoperative Fasting Guidelines    Why must I stop eating and drinking near surgery time?  With sedation, food or liquid in your stomach can enter your lungs causing serious complications  Increases nausea and vomiting    When do I need to stop eating and drinking before my surgery?  Do not eat any food after midnight the night before your surgery/procedure.  You may have up to 13.5 ounces of clear liquid until TWO hours before your instructed arrival time to the hospital.   This includes water, black tea/coffee, (no milk or cream) apple juice, and electrolyte drinks (Gatorade)  You may chew gum until TWO hours before your surgery/procedure              Preoperative Brain Exercises    What are brain exercises?  A brain exercise is any activity that engages your thinking (cognitive) skills.    What types of activities are considered brain exercises?  Jigsaw puzzles, crossword puzzles, word jumble, memory games, word search, and many more.  Many can be found free online or on your phone via a mobile charlette.    Why should I do brain exercises before my surgery?  More recent research has shown brain exercise before surgery can lower the risk of postoperative delirium (confusion) which can be especially important for older adults.  Patients who did brain exercises for 5 to 10 hours the days before surgery, cut their risk of postoperative delirium in half up to 1 week after surgery.                  [facility]    Preoperative Deep Breathing Exercises    Why it is important to do deep breathing exercises before my surgery?  Deep breathing exercises strengthen your breathing muscles.  This helps you to recover after your surgery and decreases the chance of breathing complications.      How are the deep breathing exercises done?  Sit straight with your back supported.  Breathe in deeply and slowly through your nose. Your lower rib cage should expand and your abdomen may move forward.  Hold that breath for 3 to 5 seconds.  Breathe out through pursed lips, slowly and completely.  Rest and repeat 10 times every hour while awake.  Rest longer if you become dizzy or lightheaded.      Patient Information: Incentive Spirometer  What is an incentive spirometer?  An incentive spirometer is a device used before and after surgery to “exercise” your lungs.  It helps you to take deeper breaths to expand your lungs.  Below is an example of a basic incentive spirometer.  The device you  receive may differ slightly but they all function the same.    Why do I need to use an incentive spirometer?  Using your incentive spirometer prepares your lungs for surgery and helps prevent lung problems after surgery.  How do I use my incentive spirometer?  When you're using your incentive spirometer, make sure to breathe through your mouth. If you breathe through your nose, the incentive spirometer won't work properly. You can hold your nose if you have trouble.  If you feel dizzy at any time, stop and rest. Try again at a later time.  Follow the steps below:  Set up your incentive spirometer, expand the flexible tubing and connect to the outlet.  Sit upright in a chair or bed. Hold the incentive spirometer at eye level.   Put the mouthpiece in your mouth and close your lips tightly around it. Slowly breathe out (exhale) completely.  Breathe in (inhale) slowly through your mouth as deeply as you can. As you take a breath, you will see the piston rise inside the large column. While the piston rises, the indicator should move upwards. It should stay in between the 2 arrows (see Figure).  Try to get the piston as high as you can, while keeping the indicator between the arrows.   If the indicator doesn't stay between the arrows, you're breathing either too fast or too slow.  When you get it as high as you can, hold your breath for 10 seconds, or as long as possible. While you're holding your breath, the piston will slowly fall to the base of the spirometer.  Once the piston reaches the bottom of the spirometer, breathe out slowly through your mouth. Rest for a few seconds.  Repeat 10 times. Try to get the piston to the same level with each breath.  Repeat every hour while awake  You can carefully clean the outside of the mouthpiece with an alcohol wipe or soap and water.            Patient and Family Education             Ways You Can Help Prevent Blood Clots             This handout explains some simple things you  can do to help prevent blood clots.      Blood clots are blockages that can form in the body's veins. When a blood clot forms in your deep veins, it may be called a deep vein thrombosis, or DVT for short. Blood clots can happen in any part of the body where blood flows, but they are most common in the arms and legs. If a piece of a blood clot breaks free and travels to the lungs, it is called a pulmonary embolus (PE). A PE can be a very serious problem.         Being in the hospital or having surgery can raise your chances of getting a blood clot because you may not be well enough to move around as much as you normally do.         Ways you can help prevent blood clots in the hospital         Wearing SCDs. SCDs stands for Sequential Compression Devices.   SCDs are special sleeves that wrap around your legs  They attach to a pump that fills them with air to gently squeeze your legs every few minutes.   This helps return the blood in your legs to your heart.   SCDs should only be taken off when walking or bathing.   SCDs may not be comfortable, but they can help save your life.               Wearing compression stockings - if your doctor orders them. These special snug fitting stockings gently squeeze your legs to help blood flow.       Walking. Walking helps move the blood in your legs.   If your doctor says it is ok, try walking the halls at least   5 times a day. Ask us to help you get up, so you don't fall.      Taking any blood thinning medicines your doctor orders.             [facility]; 3/23       Ways you can help prevent blood clots at home       Wearing compression stockings - if your doctor orders them. ? Walking - to help move the blood in your legs.       Taking any blood thinning medicines your doctor orders.      Signs of a blood clot or PE      Tell your doctor or nurse know right away if you have of the problems listed below.    If you are at home, seek medical care right away. Call 911 for  chest pain or problems breathing.               Signs of a blood clot (DVT) - such as pain,  swelling, redness or warmth in your arm or leg      Signs of a pulmonary embolism (PE) - such as chest     pain or feeling short of breath           The Week before Surgery        Seven days before Surgery  Check your [facility] medication instructions  Do the exercises provided to you by [facility]   Arrange for a responsible, adult licensed  to take you home after surgery and stay with you for 24 hours.  You will not be permitted to drive yourself home if you have received any anesthetic/sedation  Six days before surgery  Check your [facility] medication instructions  Do the exercises provided to you by [facility]   Start using Chlorhexidene (CHG) body wash if prescribed  Five days before surgery  Check your [facility] medication instructions  Do the exercises provided to you by [facility]   Continue to use CHG body wash if prescribed  Three days before surgery  Check your [facility] medication instructions  Do the exercises provided to you by [facility]   Continue to use CHG body wash if prescribed  Two days before surgery  Check your [facility] medication instructions  Do the exercises provided to you by [facility]   Continue to use CHG body wash if prescribed    The Day before Surgery       Check your [facility] medication and all other [facility] instructions including when to stop eating and drinking  You will be called with your arrival time for surgery in the late afternoon.  If you do not receive a call please reach out to your surgeon's office.  Do not smoke or drink 24 hours before surgery  Prepare items to bring with you to the hospital  Shower with your chlorhexidine wash if prescribed  Brush your teeth and use your chlorhexidine dental rinse if prescribed    The Day of Surgery       Check your [facility] medication instructions  Ensure you follow the instructions for when to stop eating and drinking  Shower, if prescribed use CHG.  Do not apply any lotions, creams, moisturizers, perfume or  deodorant  Brush your teeth and use your CHG dental rinse if prescribed  Wear loose comfortable clothing  Avoid make-up  Remove  jewelry and piercings, consider professional piercing removal with a plastic spacer if needed  Bring photo ID and Insurance card  Bring an accurate medication list that includes medication dose, frequency and allergies  Bring a copy of your advanced directives (will, health care power of )  Bring any devices and controllers as well as medical devices you have been provided with for surgery (CPAP, slings, braces, etc.)  Dentures, eyeglasses, and contacts will be removed before surgery, please bring cases for contacts or glasses

## 2025-04-29 NOTE — LETTER
"May 4, 2025     [place]  [address] [facility]  [facility] [address]    Patient: Salazar Jesus   YOB: 1950   Date of Visit: 4/29/2025       Dear Dr. Christopher D'Amico, DO:    Thank you for referring Salazar Jesus to me for evaluation. Below are my notes for this consultation.  If you have questions, please do not hesitate to call me. I look forward to following your patient along with you.       Sincerely,     Sea Dean MD      CC: No Recipients  ______________________________________________________________________________________    CHIEF COMPLAINT         Right hand mass    ASSESSMENT + PLAN    Dupuytren's nodule, right ring ray    I reviewed the nature of Dupuytren's disease and the intermittently progressive typical clinical course.  I discussed the multitude of treatment options including simple observation, formal open surgical cord excision, needle release, and Xiaflex enzyme injection along with the major risks, benefits, and durability of each option.    As there is currently no contracture and no interference with hand function, there is no need for any particular intervention at this point.  I reviewed the \"tabletop test\" and other common functional limitations that should prompt a return to clinic, should they appear.    Follow-up with any additional concerns.        HISTORY OF PRESENT ILLNESS       Patient is a 74 y.o. right-hand dominant male retiree, who presents today for evaluation of a mass in the right palm in the ring ray.  This was first noticed about 6 months ago.  No single specific recalled trauma to the area.  No numbness or tingling.  No popping, clicking, or instability.  It does not hurt or itch.  He is simply concerned about its nature.  No similar masses elsewhere.    He is not diabetic.  He is hypothyroid.  He does not smoke.  He has 2 drinks per day.      REVIEW OF SYSTEMS       A 30-item multi-system " Review Of Systems was obtained on today's intake form.  This was reviewed with the patient and is correct.  The pertinent positives and negatives are listed above.  The form has been scanned separately into the medical record.      PHYSICAL EXAM    Constitutional:    Appears stated age. Well-developed and well-nourished male in no acute distress.  Psychiatric:         Pleasant normal mood and affect. Behavior is appropriate for the situation.   Head:                   Normocephalic and atraumatic.  Eyes:                    Pupils are equal and round.  Cardiovascular:  2+ radial and ulnar pulses. Fingers well-perfused.  Respiratory:        Effort normal. No respiratory distress. Speaking in complete sentences.  Neurologic:       Alert and oriented to person, place, and time.  Skin:                Skin is intact, warm and dry.  Hematologic / Lymphatic:    No lymphedema or lymphangitis.    Extremities / Musculoskeletal:                      Skin of the right hand and wrist is intact with no erythema, ecchymosis, or diffuse swelling.  Normal skin drag and coloration.  Full composite finger flexion extension with full intrinsic plus minus posture.  No skin blanching.  There is a 4 mm nodule in the right ring ray in the palm at the palmar crease with an associated skin pit.  This is nontender, nonpulsatile, with no Tinel's sign.  It does appear fixed to the skin.  Sensation intact to light touch in all distributions.  Capillary refill less than 2 seconds.  Symmetric wrist and forearm motion.  Negative Harmon.  Negative midcarpal shift.      IMAGING / LABS / EMGs           None pertinent      Medical History[1]    Medication Documentation Review Audit       Reviewed by Kalani He RN (Registered Nurse) on 04/29/25 at 1017      Medication Order Taking? Sig Documenting Provider Last Dose Status   alfuzosin (Uroxatral) 10 mg 24 hr tablet [id number] Yes Take 1 tablet (10 mg) by mouth once daily. Do not crush, chew, or  split. Tana Huang MD 4/29/2025 Morning Active   amLODIPine (Norvasc) 10 mg tablet [id number] Yes Take 1 tablet (10 mg) by mouth once daily. Christopher D'Amico, DO 4/29/2025 Morning Active   aspirin 81 mg EC tablet [id number]  Take 1 tablet (81 mg) by mouth once daily.   Patient not taking: Reported on 4/9/2025    Historical Provider, MD  Active   cyclobenzaprine (Flexeril) 5 mg tablet [id number]  Take 1 tablet (5 mg) by mouth as needed at bedtime for muscle spasms.   Patient not taking: Reported on 4/9/2025    Christopher D'Amico, DO  Active   doxazosin (Cardura) 1 mg tablet [id number] Yes Take 1 tablet (1 mg) by mouth once daily at bedtime. Tana Provider, MD 4/28/2025 Active   esomeprazole (NexIUM) 20 mg DR capsule [id number] Yes Take 1 capsule (20 mg) by mouth once daily in the morning. Take before meals. Do not open capsule. Tana Provider, MD 4/29/2025 Morning Active   febuxostat (Uloric) 40 mg tablet [id number] Yes Take 1 tablet (40 mg) by mouth once daily. Christopher D'Amico, DO 4/29/2025 Morning Active   hydrocortisone 2.5 % cream [id number] Yes once daily as needed for rash. Historical Provider, MD Past Month Active   labetalol (Normodyne) 100 mg tablet [id number] Yes Take 1.5 tablets (150 mg) by mouth 2 times a day. Christopher D'Amico, DO 4/29/2025 Morning Active   levothyroxine (Synthroid, Levoxyl) 150 mcg tablet [id number] Yes Take 1 tablet (150 mcg) by mouth once daily in the morning. Take before meals.   Patient taking differently: Take 1 tablet (150 mcg) by mouth once daily in the morning. Take before meals. 2 doses on Sunday and Wednesday    Christopher D'Amico, DO 4/29/2025 Morning Active   LORazepam (Ativan) 0.5 mg tablet [id number] Yes Take 1 tablet (0.5 mg) by mouth 1 time if needed for anxiety for up to 2 doses. Milly Betancourt MD PhD Past Month Active   relugolix (Orgovyx) 120 mg tablet [id number] Yes Take 1 tablet (120 mg total) by mouth once daily.  If more than 7 days in a row are  missed, take 3 tablets by mouth once before going back to 1 tablet per day Milly Betancourt MD PhD 2025 Morning Active   rosuvastatin (Crestor) 40 mg tablet [id number] Yes Take 1 tablet (40 mg) by mouth once daily. Christopher D'Amico, DO 2025 Active   sevelamer carbonate (Renvela) 800 mg tablet [id number] No Take 1 tablet (800 mg) by mouth 3 times daily (morning, midday, late afternoon). Swallow tablet whole; do not crush, break, or chew.   Patient not taking: Reported on 2025    Historical Provider, MD Not Taking Active   sodium bicarbonate 650 mg tablet [id number] Yes Take 1 tablet (650 mg) by mouth 2 times a day. Christopher D'Amico,  2025 Morning Active   Vitamin D3 50 mcg (2,000 unit) capsule [id number] No TAKE 1 CAPSULE BY MOUTH EVERY DAY   Patient not taking: Reported on 2025    Christopher D'Amico, DO Not Taking Active                    RX Allergies[2]    Social History     Socioeconomic History   • Marital status:      Spouse name: Not on file   • Number of children: Not on file   • Years of education: Not on file   • Highest education level: Not on file   Occupational History   • Not on file   Tobacco Use   • Smoking status: Former     Current packs/day: 0.00     Types: Cigarettes     Quit date:      Years since quittin.3   • Smokeless tobacco: Never   Vaping Use   • Vaping status: Never Used   Substance and Sexual Activity   • Alcohol use: Yes     Comment: 2 drinks a day   • Drug use: Never   • Sexual activity: Defer   Other Topics Concern   • Not on file   Social History Narrative   • Not on file     Social Drivers of Health     Financial Resource Strain: Not on file   Food Insecurity: No Food Insecurity (12/10/2024)    Hunger Vital Sign    • Worried About Running Out of Food in the Last Year: Never true    • Ran Out of Food in the Last Year: Never true   Transportation Needs: Not on file   Physical Activity: Not on file   Stress: Not on file   Social Connections:  Not on file   Intimate Partner Violence: Not on file   Housing Stability: Not on file       Surgical History[3]      Electronically Signed      RAÚL Dean MD      Orthopaedic Hand Surgery      [phone]         [1]  Past Medical History:  Diagnosis Date   • BPH (benign prostatic hyperplasia)    • Cataract    • Chronic kidney disease     ESRD - Dialysis 3 x a week   • Dental crown present     loose; on upper left incisor   • ESRD (end stage renal disease) on dialysis (Multi)     Dialysis MWF   • GERD (gastroesophageal reflux disease)    • Gout    • Hemodialysis status     Lt arm fistula   • HL (hearing loss)    • HTN (hypertension)    • Hyperlipidemia    • Hypothyroidism    • ALLISON on CPAP    [2]  Allergies  Allergen Reactions   • Allopurinol Itching   [3]  Past Surgical History:  Procedure Laterality Date   • CATARACT EXTRACTION     • KNEE CARTILAGE SURGERY Left     Knee surgery   • ROTATOR CUFF REPAIR Right 2017   • THROAT SURGERY      w/ broken jaw   • WISDOM TOOTH EXTRACTION Bilateral

## 2025-04-29 NOTE — CPM/PAT H&P
CPM/PAT Evaluation       Name: Salazar Jesus (Salazar Jesus)  /Age: 1950/74 y.o.     Visit Type:   In-Person       Chief Complaint: prostate cancer    HPI: .Patient is a 73 yo M scheduled for insertion of fiducial markers with Dr. Nunes secondary to urinary frequency.  Patient was referred by Dr. Nunes  to [facility] today for perioperative risk stratification and optimization. Patient's PMHx is notable for HTN, HLD, hypothyroidism, GERD, ESRD on dialysis MWF, gout, ALLISON on CPAP, alcohol use.    Medical History[1]    Surgical History[2]    Patient Sexual activity questions deferred to the physician.    Family History[3]    Allergies[4]    Prior to Admission medications    Medication Sig Start Date End Date Taking? Authorizing Provider   alfuzosin (Uroxatral) 10 mg 24 hr tablet Take 1 tablet (10 mg) by mouth once daily. Do not crush, chew, or split.   Yes Historical Provider, MD   amLODIPine (Norvasc) 10 mg tablet Take 1 tablet (10 mg) by mouth once daily. 3/3/25  Yes Christopher D'Amico, DO   doxazosin (Cardura) 1 mg tablet Take 1 tablet (1 mg) by mouth once daily at bedtime.   Yes Historical Provider, MD   esomeprazole (NexIUM) 20 mg DR capsule Take 1 capsule (20 mg) by mouth once daily in the morning. Take before meals. Do not open capsule.   Yes Historical Provider, MD   febuxostat (Uloric) 40 mg tablet Take 1 tablet (40 mg) by mouth once daily. 3/3/25 8/30/25 Yes Christopher D'Amico, DO   hydrocortisone 2.5 % cream once daily as needed for rash. 21  Yes Historical Provider, MD   labetalol (Normodyne) 100 mg tablet Take 1.5 tablets (150 mg) by mouth 2 times a day. 3/3/25 8/30/25 Yes Christopher D'Amico, DO   levothyroxine (Synthroid, Levoxyl) 150 mcg tablet Take 1 tablet (150 mcg) by mouth once daily in the morning. Take before meals.  Patient taking differently: Take 1 tablet (150 mcg) by mouth once daily in the morning. Take before meals. 2 doses on  and Wednesday 3/3/25  Yes José  D'Amico,    LORazepam (Ativan) 0.5 mg tablet Take 1 tablet (0.5 mg) by mouth 1 time if needed for anxiety for up to 2 doses. 3/21/25  Yes Milly Betancourt MD PhD   relugolix (Orgovyx) 120 mg tablet Take 1 tablet (120 mg total) by mouth once daily.  If more than 7 days in a row are missed, take 3 tablets by mouth once before going back to 1 tablet per day 1/6/25 5/31/26 Yes Milly Betancourt MD PhD   rosuvastatin (Crestor) 40 mg tablet Take 1 tablet (40 mg) by mouth once daily. 3/3/25 8/30/25 Yes Christopher D'Amico, DO   sodium bicarbonate 650 mg tablet Take 1 tablet (650 mg) by mouth 2 times a day. 2/4/25 5/5/25 Yes Christopher D'Amico, DO   aspirin 81 mg EC tablet Take 1 tablet (81 mg) by mouth once daily.  Patient not taking: Reported on 4/9/2025    Historical Provider, MD   cyclobenzaprine (Flexeril) 5 mg tablet Take 1 tablet (5 mg) by mouth as needed at bedtime for muscle spasms.  Patient not taking: Reported on 4/9/2025 3/3/25 5/2/25  Christopher D'Amico, DO   sevelamer carbonate (Renvela) 800 mg tablet Take 1 tablet (800 mg) by mouth 3 times daily (morning, midday, late afternoon). Swallow tablet whole; do not crush, break, or chew.  Patient not taking: Reported on 4/29/2025    Historical Provider, MD   Vitamin D3 50 mcg (2,000 unit) capsule TAKE 1 CAPSULE BY MOUTH EVERY DAY  Patient not taking: Reported on 4/29/2025 1/30/24   Christopher D'Amico, DO        PAT ROS:   Constitutional:   neg    Neuro/Psych:   neg    Eyes:   neg    Ears:   neg    Nose:   neg    Mouth:   neg    Throat:   neg    Neck:   neg    Cardio:   neg    Respiratory:   neg    Endocrine:   neg    GI:   neg    :    +nocturia  Musculoskeletal:   neg    Hematologic:   neg    Skin:  neg        Physical Exam  Vitals and nursing note reviewed.   Constitutional:       General: He is not in acute distress.     Appearance: He is normal weight. He is not ill-appearing or toxic-appearing.   HENT:      Head: Normocephalic and atraumatic.      Right Ear:  "External ear normal.      Left Ear: External ear normal.      Nose: Nose normal.      Mouth/Throat:      Mouth: Mucous membranes are moist.   Eyes:      Conjunctiva/sclera: Conjunctivae normal.   Neck:      Vascular: No carotid bruit.   Cardiovascular:      Rate and Rhythm: Normal rate and regular rhythm.      Heart sounds: Normal heart sounds.      Arteriovenous access: Left arteriovenous access is present.     Comments: Palpable thrill  Pulmonary:      Effort: Pulmonary effort is normal.      Breath sounds: Normal breath sounds.   Abdominal:      General: There is no distension.      Palpations: Abdomen is soft.      Tenderness: There is no abdominal tenderness.   Musculoskeletal:         General: Normal range of motion.      Cervical back: Normal range of motion.   Skin:     General: Skin is warm and dry.      Capillary Refill: Capillary refill takes less than 2 seconds.   Neurological:      General: No focal deficit present.      Mental Status: He is alert.   Psychiatric:         Mood and Affect: Mood normal.         Behavior: Behavior normal.          PAT AIRWAY:   Airway:     Mallampati::  II    TM distance::  >3 FB    Neck ROM::  Full   Upper left bridge          Visit Vitals  /62   Pulse 61   Temp 36.7 °C (98.1 °F)   Resp 16   Ht 1.651 m (5' 5\")   Wt 72.6 kg (160 lb)   SpO2 97%   BMI 26.63 kg/m²   Smoking Status Former   BSA 1.82 m²       DASI Risk Score      Flowsheet Row Pre-Admission Testing from 2/6/2025 in [facility] Pre-Admission Testing from 10/3/2024 in [facility]   Can you take care of yourself (eat, dress, bathe, or use toilet)?  2.75 filed at 02/06/2025 1202 2.75 filed at 10/03/2024 1105   Can you walk indoors, such as around your house? 1.75 filed at 02/06/2025 1202 1.75 filed at 10/03/2024 1105   Can you walk a block or two on level ground?  2.75 filed at 02/06/2025 1202 2.75 filed at 10/03/2024 1105   Can you climb a flight of stairs or walk up a hill? " 5.5 filed at 02/06/2025 1202 5.5 filed at 10/03/2024 1105   Can you run a short distance? 8 filed at 02/06/2025 1202 8 filed at 10/03/2024 1105   Can you do light work around the house like dusting or washing dishes? 2.7 filed at 02/06/2025 1202 2.7 filed at 10/03/2024 1105   Can you do moderate work around the house like vacuuming, sweeping floors or carrying groceries? 3.5 filed at 02/06/2025 1202 3.5 filed at 10/03/2024 1105   Can you do heavy work around the house like scrubbing floors or lifting and moving heavy furniture?  8 filed at 02/06/2025 1202 8 filed at 10/03/2024 1105   Can you do yard work like raking leaves, weeding or pushing a mower? 4.5 filed at 02/06/2025 1202 4.5 filed at 10/03/2024 1105   Can you have sexual relations? 5.25 filed at 02/06/2025 1202 5.25 filed at 10/03/2024 1105   Can you participate in moderate recreational activities like golf, bowling, dancing, doubles tennis or throwing a baseball or football? 0 filed at 02/06/2025 1202 6 filed at 10/03/2024 1105   Can you participate in strenous sports like swimming, singles tennis, football, basketball, or skiing? 0 filed at 02/06/2025 1202 0 filed at 10/03/2024 1105   DASI SCORE 44.7 filed at 02/06/2025 1202 50.7 filed at 10/03/2024 1105   METS Score (Will be calculated only when all the questions are answered) 8.2 filed at 02/06/2025 1202 9 filed at 10/03/2024 1105          Caprini DVT Assessment      Flowsheet Row Pre-Admission Testing from 4/29/2025 in [facility] Pre-Admission Testing from 2/6/2025 in [facility]   DVT Score (IF A SCORE IS NOT CALCULATING, MUST SELECT A BMI TO COMPLETE) 9 filed at 04/29/2025 1055 8 filed at 02/06/2025 1200   Medical Factors Present cancer, chemotherapy, or previous malignancy, Varicose veins filed at 04/29/2025 1055 Present cancer, chemotherapy, or previous malignancy filed at 02/06/2025 1200   Surgical Factors Major surgery planned, including arthroscopic and  laproscopic (1-2 hours) filed at 04/29/2025 1055 Major surgery planned, including arthroscopic and laproscopic (1-2 hours) filed at 02/06/2025 1200   BMI (BMI MUST BE CHOSEN) 30 or less filed at 04/29/2025 1055 30 or less filed at 02/06/2025 1200          Modified Frailty Index      Flowsheet Row Pre-Admission Testing from 4/29/2025 in [facility] Pre-Admission Testing from 10/3/2024 in [facility]   Non-independent functional status (problems with dressing, bathing, personal grooming, or cooking) 0 filed at 04/29/2025 1056 0 filed at 10/03/2024 1106   History of diabetes mellitus  0 filed at 04/29/2025 1056 0 filed at 10/03/2024 1106   History of COPD 0 filed at 04/29/2025 1056 0 filed at 10/03/2024 1106   History of CHF No filed at 04/29/2025 1056 No filed at 10/03/2024 1106   History of MI 0 filed at 04/29/2025 1056 0 filed at 10/03/2024 1106   History of Percutaneous Coronary Intervention, Cardiac Surgery, or Angina No filed at 04/29/2025 1056 No filed at 10/03/2024 1106   Hypertension requiring the use of medication  0.0909 filed at 04/29/2025 1056 0.0909 filed at 10/03/2024 1106   Peripheral vascular disease 0 filed at 04/29/2025 1056 0 filed at 10/03/2024 1106   Impaired sensorium (cognitive impairement or loss, clouding, or delirium) 0 filed at 04/29/2025 1056 0 filed at 10/03/2024 1106   TIA or CVA withouy residual deficit 0 filed at 04/29/2025 1056 0 filed at 10/03/2024 1106   Cerebrovascular accident with deficit 0 filed at 04/29/2025 1056 0 filed at 10/03/2024 1106   Modified Frailty Index Calculator .0909 filed at 04/29/2025 1056 .0909 filed at 10/03/2024 1106          [id number] Stroke Risk Points  Current as of just now        N/A 0 to 9 Points:      Last Change: N/A          The [id number] risk score (Lip WALI, et al. 2009. © 2010 [employer]) quantifies the risk of stroke for a patient with atrial fibrillation. For patients without atrial  fibrillation or under the age of 18 this score appears as N/A. Higher score values generally indicate higher risk of stroke.        This score is not applicable to this patient. Components are not calculated.          Revised Cardiac Risk Index      Flowsheet Row Pre-Admission Testing from 4/29/2025 in [facility] Pre-Admission Testing from 2/6/2025 in [facility]   High-Risk Surgery (Intraperitoneal, Intrathoracic,Suprainguinal vascular) 0 filed at 04/29/2025 1056 0 filed at 02/06/2025 1203   History of ischemic heart disease (History of MI, History of positive exercuse test, Current chest paint considered due to myocardial ischemia, Use of nitrate therapy, ECG with pathological Q Waves) 0 filed at 04/29/2025 1056 0 filed at 02/06/2025 1203   History of congestive heart failure (pulmonary edemia, bilateral rales or S3 gallop, Paroxysmal nocturnal dyspnea, CXR showing pulmonary vascular redistribution) 0 filed at 04/29/2025 1056 0 filed at 02/06/2025 1203   History of cerebrovascular disease (Prior TIA or stroke) 0 filed at 04/29/2025 1056 0 filed at 02/06/2025 1203   Pre-operative insulin treatment 0 filed at 04/29/2025 1056 0 filed at 02/06/2025 1203   Pre-operative creatinine>2 mg/dl 1 filed at 04/29/2025 1056 1 filed at 02/06/2025 1203   Revised Cardiac Risk Calculator 1 filed at 04/29/2025 1056 1 filed at 02/06/2025 1203          Apfel Simplified Score      Flowsheet Row Pre-Admission Testing from 10/3/2024 in [facility]   Smoking status 1 filed at 10/03/2024 1106   History of motion sickness or PONV  0 filed at 10/03/2024 1106   Use of postoperative opioids 0 filed at 10/03/2024 1106   Gender - Female 0=No filed at 10/03/2024 1106   Apfel Simplified Score Calculator 1 filed at 10/03/2024 1106          Risk Analysis Index Results This Encounter    No data found in the last 10 encounters.       Stop Bang Score      Flowsheet Row Pre-Admission Testing from  4/29/2025 in [facility] Clinical Support from 4/9/2025 in [facility]   Do you snore loudly? 1 filed at 04/29/2025 1020 1 filed at 04/09/2025 0954   Do you often feel tired or fatigued after your sleep? 0 filed at 04/29/2025 1020 1 filed at 04/09/2025 0954   Has anyone ever observed you stop breathing in your sleep? 1 filed at 04/29/2025 1020 1 filed at 04/09/2025 0954   Do you have or are you being treated for high blood pressure? 1 filed at 04/29/2025 1020 1 filed at 04/09/2025 0954   Recent BMI (Calculated) 26.6 filed at 04/29/2025 1020 26.6 filed at 04/09/2025 0954   Is BMI greater than 35 kg/m2? 0=No filed at 04/29/2025 1020 0=No filed at 04/09/2025 0954   Age older than 50 years old? 1=Yes filed at 04/29/2025 1020 1=Yes filed at 04/09/2025 0954   Is your neck circumference greater than 17 inches (Male) or 16 inches (Female)? 0 filed at 04/29/2025 1020 --   Gender - Male 1=Yes filed at 04/29/2025 1020 1=Yes filed at 04/09/2025 0954   STOP-BANG Total Score 5 filed at 04/29/2025 1020 --          Prodigy: High Risk  Total Score: 20              Prodigy Age Score      Prodigy Gender Score          ARISCAT Score for Postoperative Pulmonary Complications      Flowsheet Row Pre-Admission Testing from 4/29/2025 in [facility] Pre-Admission Testing from 2/6/2025 in [facility]   Age Calculated Score 3 filed at 04/29/2025 1155 3 filed at 02/06/2025 1203   Preoperative SpO2 0 filed at 04/29/2025 1155 0 filed at 02/06/2025 1203   Respiratory infection in the last month Either upper or lower (i.e., URI, bronchitis, pneumonia), with fever and antibiotic treatment 0 filed at 04/29/2025 1155 0 filed at 02/06/2025 1203   Preoperative anemia (Hgb less than 10 g/dl) 0 filed at 04/29/2025 1155 0 filed at 02/06/2025 1203   Surgical incision  0 filed at 04/29/2025 1155 0 filed at 02/06/2025 1203   Duration of surgery  0 filed at 04/29/2025 1155 0 filed at 02/06/2025  1203   Emergency Procedure  0 filed at 04/29/2025 1155 0 filed at 02/06/2025 1203   ARISCAT Total Score  3 filed at 04/29/2025 1155 3 filed at 02/06/2025 1203          Yoanna Perioperative Risk for Myocardial Infarction or Cardiac Arrest (KIMBERLEY)      Flowsheet Row Pre-Admission Testing from 4/29/2025 in [facility] Pre-Admission Testing from 2/6/2025 in [facility]   Calculated Age Score 1.52 filed at 04/29/2025 1155 1.52 filed at 02/06/2025 1203   Functional Status  0 filed at 04/29/2025 1155 0 filed at 02/06/2025 1203   ASA Class  -1.92 filed at 04/29/2025 1155 -1.92 filed at 02/06/2025 1203   Creatinine 0.61 filed at 04/29/2025 1155 0.61 filed at 02/06/2025 1203   Type of Procedure  -0.26 filed at 04/29/2025 1155 -0.26 filed at 02/06/2025 1203   KIMBERLEY Total Score  -5.3 filed at 04/29/2025 1155 -5.3 filed at 02/06/2025 1203   KIMBERLEY % 0.5 filed at 04/29/2025 1155 0.5 filed at 02/06/2025 1203            Assessment & Plan    Neuro:   No neurologic diagnoses, however, the patient is at an increased risk for post operative delirium secondary to age >/= 65 and renal insuff..  Preoperative brain exercise educational handout provided to patient.    The patient is at an increased risk for perioperative stroke secondary to chronic renal failure , increased age, HTN, HLD, and general anesthesia.    HEENT/Airway:   - ALLISON - CPAP compliant  STOP-BANG Score- 5 points high risk for ALLISON    Mallampati::  II    TM distance::  >3 FB    Neck ROM::  Full  Dentures-denies; right side and left side bridge  Crowns-reports  Implants-denies    Cardiovascular:    -HTN - on amlodipine, doxazosin, labeatlol  -HLD - on rosuvastatin    METS: 8.2  RCRI: 1 point, 6.0%   30 day risk of MACE (risk for cardiac death, nonfatal myocardial infarction, and nonfactal cardiac arrest  KIMBERLEY:  0.5  % risk of intraoperative or 30-day postoperative MACE  EKG -normal sinus, unchanged from previous tracings Rate 62- No acute  changes    Pulmonary:     No diagnosis or significant findings on chart review or clinical presentation and evaluation.   ARISCAT: <26 points, 1.6% risk of in-hospital postoperative pulmonary complication  PRODIGY: High risk for opioid induced respiratory depression  Smoking History-He quit smoking approximately 30 years ago.  Preoperative deep breathing educational handout provided to patient.    Renal:    -ESRD - on dialysis MWF, left upper extremity AV fistula w/ palpable thrill  The patient is at increased risk of perioperative renal complications secondary to age>/= 56, male sex, HTN, and renal insuff (preop creat >1.2 mg/dl). Preventative measures include BP monitoring, medication compliance, and hydration management.   CMP- Creatinine- 5.23; GFR- 11    Endocrine:    -Hypothyroidism- on levothyroxine; PCP has recently adjusted med, will redraw labs; TSH trending towards normal, T4 slightly elevated - messaged patient's PCP with results    Hematologic:    No diagnosis or significant findings on chart review or clinical presentation and evaluation.  The patient is not a Jehovah’s witness and will accept blood and blood products if medically indicated.   History of previous blood transfusions No  CBC- at baseline    Caprini Score 9, patient at High for postoperative DVT. Pt supplied education/VTE handout  Anticoagulation use: No   Preoperative DVT educational handout provided to patient.    Gastrointestinal:     -GERD - on esomeprazole  Recreational drug use: Drug use No  Alcohol use - drinks 2 alcoholic drinks a day    Apfel: 1 points 21% risk for post operative N/V    Infectious disease:    No diagnosis or significant findings on chart review or clinical presentation and evaluation.     Musculoskeletal:    -Gout- on uloric, stable      Anesthesia:  ASA 3 - Patient with moderate systemic disease with functional limitations  Anticipated anesthesia-General  History of General anesthesia- yes  Complications- No  anesthesia complications  No family history of anesthesia complications    Nickel/metal allergy-negative  Shellfish allergy-negative    Discussed with patient medication instructions, NPO guidelines, and any questions or concerns. Patient does not need further workup prior to preceding with elective surgery based on based on risk assessment. Patient case was discussed with Dr. Canada and Dr. Yi.      Viri Dorman PA-C 4/29/2025 2:18 PM             [1]   Past Medical History:  Diagnosis Date    BPH (benign prostatic hyperplasia)     Cataract     Chronic kidney disease     ESRD - Dialysis 3 x a week    Dental crown present     loose; on upper left incisor    ESRD (end stage renal disease) on dialysis (Multi)     Dialysis MWF    GERD (gastroesophageal reflux disease)     Gout     Hemodialysis status     Lt arm fistula    HL (hearing loss)     HTN (hypertension)     Hyperlipidemia     Hypothyroidism     ALLISON on CPAP    [2]   Past Surgical History:  Procedure Laterality Date    CATARACT EXTRACTION      KNEE CARTILAGE SURGERY Left     Knee surgery    ROTATOR CUFF REPAIR Right 2017    THROAT SURGERY      w/ broken jaw    WISDOM TOOTH EXTRACTION Bilateral    [3]   Family History  Problem Relation Name Age of Onset    Heart disease Mother      Stroke Father      Parkinsonism Brother     [4]   Allergies  Allergen Reactions    Allopurinol Itching

## 2025-04-29 NOTE — PROGRESS NOTES
"CHIEF COMPLAINT         Right hand mass    ASSESSMENT + PLAN    Dupuytren's nodule, right ring ray    I reviewed the nature of Dupuytren's disease and the intermittently progressive typical clinical course.  I discussed the multitude of treatment options including simple observation, formal open surgical cord excision, needle release, and Xiaflex enzyme injection along with the major risks, benefits, and durability of each option.    As there is currently no contracture and no interference with hand function, there is no need for any particular intervention at this point.  I reviewed the \"tabletop test\" and other common functional limitations that should prompt a return to clinic, should they appear.    Follow-up with any additional concerns.        HISTORY OF PRESENT ILLNESS       Patient is a 74 y.o. right-hand dominant male retiree, who presents today for evaluation of a mass in the right palm in the ring ray.  This was first noticed about 6 months ago.  No single specific recalled trauma to the area.  No numbness or tingling.  No popping, clicking, or instability.  It does not hurt or itch.  He is simply concerned about its nature.  No similar masses elsewhere.    He is not diabetic.  He is hypothyroid.  He does not smoke.  He has 2 drinks per day.      REVIEW OF SYSTEMS       A 30-item multi-system Review Of Systems was obtained on today's intake form.  This was reviewed with the patient and is correct.  The pertinent positives and negatives are listed above.  The form has been scanned separately into the medical record.      PHYSICAL EXAM    Constitutional:    Appears stated age. Well-developed and well-nourished male in no acute distress.  Psychiatric:         Pleasant normal mood and affect. Behavior is appropriate for the situation.   Head:                   Normocephalic and atraumatic.  Eyes:                    Pupils are equal and round.  Cardiovascular:  2+ radial and ulnar pulses. Fingers " well-perfused.  Respiratory:        Effort normal. No respiratory distress. Speaking in complete sentences.  Neurologic:       Alert and oriented to person, place, and time.  Skin:                Skin is intact, warm and dry.  Hematologic / Lymphatic:    No lymphedema or lymphangitis.    Extremities / Musculoskeletal:                      Skin of the right hand and wrist is intact with no erythema, ecchymosis, or diffuse swelling.  Normal skin drag and coloration.  Full composite finger flexion extension with full intrinsic plus minus posture.  No skin blanching.  There is a 4 mm nodule in the right ring ray in the palm at the palmar crease with an associated skin pit.  This is nontender, nonpulsatile, with no Tinel's sign.  It does appear fixed to the skin.  Sensation intact to light touch in all distributions.  Capillary refill less than 2 seconds.  Symmetric wrist and forearm motion.  Negative Harmon.  Negative midcarpal shift.      IMAGING / LABS / EMGs           None pertinent      Medical History[1]    Medication Documentation Review Audit       Reviewed by Kalani He RN (Registered Nurse) on 04/29/25 at 1017      Medication Order Taking? Sig Documenting Provider Last Dose Status   alfuzosin (Uroxatral) 10 mg 24 hr tablet [id number] Yes Take 1 tablet (10 mg) by mouth once daily. Do not crush, chew, or split. Historical Provider, MD 4/29/2025 Morning Active   amLODIPine (Norvasc) 10 mg tablet [id number] Yes Take 1 tablet (10 mg) by mouth once daily. Christopher D'Amico, DO 4/29/2025 Morning Active   aspirin 81 mg EC tablet [id number]  Take 1 tablet (81 mg) by mouth once daily.   Patient not taking: Reported on 4/9/2025    Historical Provider, MD  Active   cyclobenzaprine (Flexeril) 5 mg tablet [id number]  Take 1 tablet (5 mg) by mouth as needed at bedtime for muscle spasms.   Patient not taking: Reported on 4/9/2025    Christopher D'Amico, DO  Active   doxazosin (Cardura) 1 mg tablet [id number] Yes Take 1  tablet (1 mg) by mouth once daily at bedtime. Historical Provider, MD 4/28/2025 Active   esomeprazole (NexIUM) 20 mg DR capsule [id number] Yes Take 1 capsule (20 mg) by mouth once daily in the morning. Take before meals. Do not open capsule. Historical Provider, MD 4/29/2025 Morning Active   febuxostat (Uloric) 40 mg tablet [id number] Yes Take 1 tablet (40 mg) by mouth once daily. Christopher D'Amico, DO 4/29/2025 Morning Active   hydrocortisone 2.5 % cream [id number] Yes once daily as needed for rash. Historical Provider, MD Past Month Active   labetalol (Normodyne) 100 mg tablet [id number] Yes Take 1.5 tablets (150 mg) by mouth 2 times a day. Christopher D'Amico, DO 4/29/2025 Morning Active   levothyroxine (Synthroid, Levoxyl) 150 mcg tablet [id number] Yes Take 1 tablet (150 mcg) by mouth once daily in the morning. Take before meals.   Patient taking differently: Take 1 tablet (150 mcg) by mouth once daily in the morning. Take before meals. 2 doses on Sunday and Wednesday    Christopher D'Amico, DO 4/29/2025 Morning Active   LORazepam (Ativan) 0.5 mg tablet [id number] Yes Take 1 tablet (0.5 mg) by mouth 1 time if needed for anxiety for up to 2 doses. Milly Betancourt MD PhD Past Month Active   relugolix (Orgovyx) 120 mg tablet [id number] Yes Take 1 tablet (120 mg total) by mouth once daily.  If more than 7 days in a row are missed, take 3 tablets by mouth once before going back to 1 tablet per day Milly Betancourt MD PhD 4/29/2025 Morning Active   rosuvastatin (Crestor) 40 mg tablet [id number] Yes Take 1 tablet (40 mg) by mouth once daily. Christopher D'Amico, DO 4/28/2025 Active   sevelamer carbonate (Renvela) 800 mg tablet [id number] No Take 1 tablet (800 mg) by mouth 3 times daily (morning, midday, late afternoon). Swallow tablet whole; do not crush, break, or chew.   Patient not taking: Reported on 4/29/2025    Historical Provider, MD Not Taking Active   sodium bicarbonate 650 mg tablet [id number] Yes Take 1 tablet (650 mg)  by mouth 2 times a day. Christopher D'Amico, DO 2025 Morning Active   Vitamin D3 50 mcg (2,000 unit) capsule [id number] No TAKE 1 CAPSULE BY MOUTH EVERY DAY   Patient not taking: Reported on 2025    Christopher D'Amico, DO Not Taking Active                    RX Allergies[2]    Social History     Socioeconomic History    Marital status:      Spouse name: Not on file    Number of children: Not on file    Years of education: Not on file    Highest education level: Not on file   Occupational History    Not on file   Tobacco Use    Smoking status: Former     Current packs/day: 0.00     Types: Cigarettes     Quit date:      Years since quittin.3    Smokeless tobacco: Never   Vaping Use    Vaping status: Never Used   Substance and Sexual Activity    Alcohol use: Yes     Comment: 2 drinks a day    Drug use: Never    Sexual activity: Defer   Other Topics Concern    Not on file   Social History Narrative    Not on file     Social Drivers of Health     Financial Resource Strain: Not on file   Food Insecurity: No Food Insecurity (12/10/2024)    Hunger Vital Sign     Worried About Running Out of Food in the Last Year: Never true     Ran Out of Food in the Last Year: Never true   Transportation Needs: Not on file   Physical Activity: Not on file   Stress: Not on file   Social Connections: Not on file   Intimate Partner Violence: Not on file   Housing Stability: Not on file       Surgical History[3]      Electronically Signed      RAÚL Dean MD      Orthopaedic Hand Surgery      [phone]         [1]   Past Medical History:  Diagnosis Date    BPH (benign prostatic hyperplasia)     Cataract     Chronic kidney disease     ESRD - Dialysis 3 x a week    Dental crown present     loose; on upper left incisor    ESRD (end stage renal disease) on dialysis (Multi)     Dialysis MWF    GERD (gastroesophageal reflux disease)     Gout     Hemodialysis status     Lt arm fistula    HL (hearing loss)     HTN  (hypertension)     Hyperlipidemia     Hypothyroidism     ALLISON on CPAP    [2]   Allergies  Allergen Reactions    Allopurinol Itching   [3]   Past Surgical History:  Procedure Laterality Date    CATARACT EXTRACTION      KNEE CARTILAGE SURGERY Left     Knee surgery    ROTATOR CUFF REPAIR Right 2017    THROAT SURGERY      w/ broken jaw    WISDOM TOOTH EXTRACTION Bilateral

## 2025-04-30 LAB — T4 FREE SERPL-MCNC: 1.73 NG/DL (ref 0.78–1.48)

## 2025-05-01 ENCOUNTER — TELEPHONE (OUTPATIENT)
Dept: PRIMARY CARE | Facility: CLINIC | Age: 75
End: 2025-05-01
Payer: MEDICARE

## 2025-05-01 NOTE — RESULT ENCOUNTER NOTE
His thyroid labs are still coming back a little abnormal.  The T4 is actually a little high, though TSH is also slightly high, which is paradoxical.  Given that the T4 is a little high, I think that would be the more relevant labs to work off of.  Please find out his exact thyroid regimen, so we can adjusted a little bit.  If labs are staying paradoxical like this, I also think it would be reasonable to see an endocrinologist, which I will put in a referral for if he is interested.

## 2025-05-01 NOTE — TELEPHONE ENCOUNTER
Result Communication    Resulted Orders   Basic Metabolic Panel   Result Value Ref Range    Glucose 90 74 - 99 mg/dL    Sodium 138 136 - 145 mmol/L    Potassium 4.3 3.5 - 5.3 mmol/L    Chloride 96 (L) 98 - 107 mmol/L    Bicarbonate 31 21 - 32 mmol/L    Anion Gap 15 10 - 20 mmol/L    Urea Nitrogen 23 6 - 23 mg/dL    Creatinine 4.73 (H) 0.50 - 1.30 mg/dL    eGFR 12 (L) >60 mL/min/1.73m*2      Comment:      Calculations of estimated GFR are performed using the 2021 CKD-EPI Study Refit equation without the race variable for the IDMS-Traceable creatinine methods.  https://jasn.asnjournals.org/content/early/2021/09/22/ASN.4358739554    Calcium 10.2 8.6 - 10.3 mg/dL   TSH with reflex to Free T4 if abnormal   Result Value Ref Range    Thyroid Stimulating Hormone 4.35 (H) 0.44 - 3.98 mIU/L    Narrative    TSH testing is performed using different testing methodology at [facility] than at other system [facility]. Direct result comparisons should only be made within the same method.     Thyroxine, Free   Result Value Ref Range    Thyroxine, Free 1.73 (H) 0.78 - 1.48 ng/dL    Narrative    Thyroxine Free testing is performed using different testing methodology at [facility] than at other [facility]. Direct result comparisons should only be made within the same method.       3:54 PM      Results were successfully communicated with the patient and they acknowledged their understanding.

## 2025-05-01 NOTE — TELEPHONE ENCOUNTER
----- Message from Christopher D'Amico sent at 5/1/2025 10:36 AM EDT -----  His thyroid labs are still coming back a little abnormal.  The T4 is actually a little high, though TSH is also slightly high, which is paradoxical.  Given that the T4 is a little high, I think that   would be the more relevant labs to work off of.  Please find out his exact thyroid regimen, so we can adjusted a little bit.  If labs are staying paradoxical like this, I also think it would be   reasonable to see an endocrinologist, which I will put in a referral for if he is interested.  ----- Message -----  From: Viri Dorman PA-C  Sent: 5/1/2025   7:32 AM EDT  To: Christopher D'Amico,   Subject: TSH/T4 results                                   Hi Dr. D'Amico,    This patient is scheduled for a procedure at [facility] and I saw him this week for his preadmission testing appointment. I just wanted to forward you these results for his TSH and T4,   I know you have been adjusting his meds.    Thanks,  Viri  ----- Message -----  From: Lab, Background User  Sent: 4/29/2025  12:37 PM EDT  To: Viri Dorman PA-C

## 2025-05-02 DIAGNOSIS — E03.9 HYPOTHYROIDISM, UNSPECIFIED TYPE: Primary | ICD-10-CM

## 2025-05-02 RX ORDER — LEVOTHYROXINE SODIUM 175 UG/1
175 TABLET ORAL DAILY
Qty: 30 TABLET | Refills: 11 | Status: SHIPPED | OUTPATIENT
Start: 2025-05-02 | End: 2026-05-02

## 2025-05-02 NOTE — PROGRESS NOTES
Slightly elevated TSH, but high T4.  We will reduce his weekly levothyroxine dosing.  Currently on 150 mcg 9 tablets a week, will reduce to 175 mcg daily.  Repeat labs in 4 to 6 weeks.

## 2025-05-04 PROBLEM — M72.0 DUPUYTREN'S DISEASE OF PALM OF RIGHT HAND: Status: ACTIVE | Noted: 2025-05-04

## 2025-05-06 ENCOUNTER — ANESTHESIA (OUTPATIENT)
Dept: OPERATING ROOM | Facility: HOSPITAL | Age: 75
End: 2025-05-06
Payer: MEDICARE

## 2025-05-06 ENCOUNTER — ANESTHESIA EVENT (OUTPATIENT)
Dept: OPERATING ROOM | Facility: HOSPITAL | Age: 75
End: 2025-05-06
Payer: MEDICARE

## 2025-05-06 ENCOUNTER — SPECIALTY PHARMACY (OUTPATIENT)
Dept: PHARMACY | Facility: CLINIC | Age: 75
End: 2025-05-06

## 2025-05-06 ENCOUNTER — HOSPITAL ENCOUNTER (OUTPATIENT)
Facility: HOSPITAL | Age: 75
Setting detail: OUTPATIENT SURGERY
Discharge: HOME | End: 2025-05-06
Attending: STUDENT IN AN ORGANIZED HEALTH CARE EDUCATION/TRAINING PROGRAM | Admitting: STUDENT IN AN ORGANIZED HEALTH CARE EDUCATION/TRAINING PROGRAM
Payer: MEDICARE

## 2025-05-06 VITALS
BODY MASS INDEX: 27.18 KG/M2 | HEART RATE: 58 BPM | HEIGHT: 65 IN | RESPIRATION RATE: 18 BRPM | TEMPERATURE: 97.2 F | WEIGHT: 163.14 LBS | DIASTOLIC BLOOD PRESSURE: 74 MMHG | OXYGEN SATURATION: 99 % | SYSTOLIC BLOOD PRESSURE: 133 MMHG

## 2025-05-06 DIAGNOSIS — C61 PROSTATE CANCER (MULTI): ICD-10-CM

## 2025-05-06 PROBLEM — K21.9 GASTROESOPHAGEAL REFLUX DISEASE: Status: ACTIVE | Noted: 2025-05-06

## 2025-05-06 PROBLEM — G47.33 OSA (OBSTRUCTIVE SLEEP APNEA): Status: ACTIVE | Noted: 2025-05-06

## 2025-05-06 PROCEDURE — 76872 US TRANSRECTAL: CPT | Performed by: STUDENT IN AN ORGANIZED HEALTH CARE EDUCATION/TRAINING PROGRAM

## 2025-05-06 PROCEDURE — 2500000004 HC RX 250 GENERAL PHARMACY W/ HCPCS (ALT 636 FOR OP/ED): Mod: JZ | Performed by: NURSE ANESTHETIST, CERTIFIED REGISTERED

## 2025-05-06 PROCEDURE — 2500000004 HC RX 250 GENERAL PHARMACY W/ HCPCS (ALT 636 FOR OP/ED): Performed by: STUDENT IN AN ORGANIZED HEALTH CARE EDUCATION/TRAINING PROGRAM

## 2025-05-06 PROCEDURE — 7100000010 HC PHASE TWO TIME - EACH INCREMENTAL 1 MINUTE: Performed by: STUDENT IN AN ORGANIZED HEALTH CARE EDUCATION/TRAINING PROGRAM

## 2025-05-06 PROCEDURE — 3700000001 HC GENERAL ANESTHESIA TIME - INITIAL BASE CHARGE: Performed by: STUDENT IN AN ORGANIZED HEALTH CARE EDUCATION/TRAINING PROGRAM

## 2025-05-06 PROCEDURE — A55876 PR PLACE RADIOTHER DEVICE/MARKER, PROSTATE: Performed by: ANESTHESIOLOGY

## 2025-05-06 PROCEDURE — 2780000003 HC OR 278 NO HCPCS: Performed by: STUDENT IN AN ORGANIZED HEALTH CARE EDUCATION/TRAINING PROGRAM

## 2025-05-06 PROCEDURE — C1889 IMPLANT/INSERT DEVICE, NOC: HCPCS | Performed by: STUDENT IN AN ORGANIZED HEALTH CARE EDUCATION/TRAINING PROGRAM

## 2025-05-06 PROCEDURE — 55876 PLACE RT DEVICE/MARKER PROS: CPT | Performed by: STUDENT IN AN ORGANIZED HEALTH CARE EDUCATION/TRAINING PROGRAM

## 2025-05-06 PROCEDURE — 55874 TPRNL PLMT BIODEGRDABL MATRL: CPT | Performed by: STUDENT IN AN ORGANIZED HEALTH CARE EDUCATION/TRAINING PROGRAM

## 2025-05-06 PROCEDURE — 7100000002 HC RECOVERY ROOM TIME - EACH INCREMENTAL 1 MINUTE: Performed by: STUDENT IN AN ORGANIZED HEALTH CARE EDUCATION/TRAINING PROGRAM

## 2025-05-06 PROCEDURE — 3600000004 HC OR TIME - INITIAL BASE CHARGE - PROCEDURE LEVEL FOUR: Performed by: STUDENT IN AN ORGANIZED HEALTH CARE EDUCATION/TRAINING PROGRAM

## 2025-05-06 PROCEDURE — 3700000002 HC GENERAL ANESTHESIA TIME - EACH INCREMENTAL 1 MINUTE: Performed by: STUDENT IN AN ORGANIZED HEALTH CARE EDUCATION/TRAINING PROGRAM

## 2025-05-06 PROCEDURE — 7100000009 HC PHASE TWO TIME - INITIAL BASE CHARGE: Performed by: STUDENT IN AN ORGANIZED HEALTH CARE EDUCATION/TRAINING PROGRAM

## 2025-05-06 PROCEDURE — 3600000009 HC OR TIME - EACH INCREMENTAL 1 MINUTE - PROCEDURE LEVEL FOUR: Performed by: STUDENT IN AN ORGANIZED HEALTH CARE EDUCATION/TRAINING PROGRAM

## 2025-05-06 PROCEDURE — 7100000001 HC RECOVERY ROOM TIME - INITIAL BASE CHARGE: Performed by: STUDENT IN AN ORGANIZED HEALTH CARE EDUCATION/TRAINING PROGRAM

## 2025-05-06 PROCEDURE — 99100 ANES PT EXTEME AGE<1 YR&>70: CPT | Performed by: ANESTHESIOLOGY

## 2025-05-06 PROCEDURE — A55876 PR PLACE RADIOTHER DEVICE/MARKER, PROSTATE: Performed by: NURSE ANESTHETIST, CERTIFIED REGISTERED

## 2025-05-06 DEVICE — STERILE PLACEMENT NEEDLES (17GA ETW X 20CM) WITH BONE WAX AND (1.2 X 3MM) SOFT TISSUE GOLD MARKER [3]
Type: IMPLANTABLE DEVICE | Site: PROSTATE | Status: FUNCTIONAL
Brand: FIDUCIAL MARKER KIT

## 2025-05-06 RX ORDER — FENTANYL CITRATE 50 UG/ML
INJECTION, SOLUTION INTRAMUSCULAR; INTRAVENOUS AS NEEDED
Status: DISCONTINUED | OUTPATIENT
Start: 2025-05-06 | End: 2025-05-06

## 2025-05-06 RX ORDER — FENTANYL CITRATE 50 UG/ML
50 INJECTION, SOLUTION INTRAMUSCULAR; INTRAVENOUS EVERY 5 MIN PRN
Status: DISCONTINUED | OUTPATIENT
Start: 2025-05-06 | End: 2025-05-06 | Stop reason: HOSPADM

## 2025-05-06 RX ORDER — IPRATROPIUM BROMIDE 0.5 MG/2.5ML
500 SOLUTION RESPIRATORY (INHALATION) ONCE
Status: DISCONTINUED | OUTPATIENT
Start: 2025-05-06 | End: 2025-05-06 | Stop reason: HOSPADM

## 2025-05-06 RX ORDER — FENTANYL CITRATE 50 UG/ML
25 INJECTION, SOLUTION INTRAMUSCULAR; INTRAVENOUS EVERY 5 MIN PRN
Status: DISCONTINUED | OUTPATIENT
Start: 2025-05-06 | End: 2025-05-06 | Stop reason: HOSPADM

## 2025-05-06 RX ORDER — LABETALOL HYDROCHLORIDE 5 MG/ML
5 INJECTION, SOLUTION INTRAVENOUS ONCE AS NEEDED
Status: DISCONTINUED | OUTPATIENT
Start: 2025-05-06 | End: 2025-05-06 | Stop reason: HOSPADM

## 2025-05-06 RX ORDER — PROPOFOL 10 MG/ML
INJECTION, EMULSION INTRAVENOUS AS NEEDED
Status: DISCONTINUED | OUTPATIENT
Start: 2025-05-06 | End: 2025-05-06

## 2025-05-06 RX ORDER — LIDOCAINE HYDROCHLORIDE 10 MG/ML
INJECTION, SOLUTION INFILTRATION; PERINEURAL AS NEEDED
Status: DISCONTINUED | OUTPATIENT
Start: 2025-05-06 | End: 2025-05-06

## 2025-05-06 RX ORDER — HYDRALAZINE HYDROCHLORIDE 20 MG/ML
5 INJECTION INTRAMUSCULAR; INTRAVENOUS EVERY 30 MIN PRN
Status: DISCONTINUED | OUTPATIENT
Start: 2025-05-06 | End: 2025-05-06 | Stop reason: HOSPADM

## 2025-05-06 RX ORDER — CEFAZOLIN SODIUM 2 G/100ML
2 INJECTION, SOLUTION INTRAVENOUS ONCE
Status: COMPLETED | OUTPATIENT
Start: 2025-05-06 | End: 2025-05-06

## 2025-05-06 RX ORDER — SODIUM CHLORIDE, SODIUM LACTATE, POTASSIUM CHLORIDE, CALCIUM CHLORIDE 600; 310; 30; 20 MG/100ML; MG/100ML; MG/100ML; MG/100ML
100 INJECTION, SOLUTION INTRAVENOUS CONTINUOUS
Status: DISCONTINUED | OUTPATIENT
Start: 2025-05-06 | End: 2025-05-06 | Stop reason: HOSPADM

## 2025-05-06 RX ORDER — BUPIVACAINE HYDROCHLORIDE 2.5 MG/ML
INJECTION, SOLUTION INFILTRATION; PERINEURAL AS NEEDED
Status: DISCONTINUED | OUTPATIENT
Start: 2025-05-06 | End: 2025-05-06 | Stop reason: HOSPADM

## 2025-05-06 RX ORDER — ALBUTEROL SULFATE 0.83 MG/ML
2.5 SOLUTION RESPIRATORY (INHALATION) ONCE AS NEEDED
Status: DISCONTINUED | OUTPATIENT
Start: 2025-05-06 | End: 2025-05-06 | Stop reason: HOSPADM

## 2025-05-06 RX ADMIN — CEFAZOLIN SODIUM 2 G: 2 INJECTION, SOLUTION INTRAVENOUS at 07:04

## 2025-05-06 RX ADMIN — PROPOFOL 20 MG: 10 INJECTION, EMULSION INTRAVENOUS at 07:11

## 2025-05-06 RX ADMIN — PROPOFOL 40 MG: 10 INJECTION, EMULSION INTRAVENOUS at 07:14

## 2025-05-06 RX ADMIN — PROPOFOL 30 MG: 10 INJECTION, EMULSION INTRAVENOUS at 07:09

## 2025-05-06 RX ADMIN — FENTANYL CITRATE 25 MCG: 0.05 INJECTION, SOLUTION INTRAMUSCULAR; INTRAVENOUS at 07:16

## 2025-05-06 RX ADMIN — LIDOCAINE HYDROCHLORIDE 5 ML: 10 INJECTION, SOLUTION INFILTRATION; PERINEURAL at 07:07

## 2025-05-06 RX ADMIN — PROPOFOL 50 MG: 10 INJECTION, EMULSION INTRAVENOUS at 07:07

## 2025-05-06 RX ADMIN — SODIUM CHLORIDE: 9 INJECTION, SOLUTION INTRAVENOUS at 07:04

## 2025-05-06 RX ADMIN — FENTANYL CITRATE 25 MCG: 0.05 INJECTION, SOLUTION INTRAMUSCULAR; INTRAVENOUS at 07:07

## 2025-05-06 RX ADMIN — PROPOFOL 30 MG: 10 INJECTION, EMULSION INTRAVENOUS at 07:16

## 2025-05-06 RX ADMIN — PROPOFOL 50 MCG/KG/MIN: 10 INJECTION, EMULSION INTRAVENOUS at 07:08

## 2025-05-06 SDOH — HEALTH STABILITY: MENTAL HEALTH: CURRENT SMOKER: 0

## 2025-05-06 ASSESSMENT — PAIN - FUNCTIONAL ASSESSMENT
PAIN_FUNCTIONAL_ASSESSMENT: 0-10

## 2025-05-06 ASSESSMENT — PAIN SCALES - GENERAL
PAINLEVEL_OUTOF10: 0 - NO PAIN
PAIN_LEVEL: 0
PAINLEVEL_OUTOF10: 0 - NO PAIN
PAINLEVEL_OUTOF10: 0 - NO PAIN

## 2025-05-06 NOTE — ANESTHESIA POSTPROCEDURE EVALUATION
Patient: Salazar Jesus    Procedure Summary       Date: 05/06/25 Room / Location: [facility] 01 / Virtual [facility]    Anesthesia Start: 0704 Anesthesia Stop: 0734    Procedure: Insertion Fiducial Marker Prostate Diagnosis:       Prostate cancer (Multi)      (Prostate cancer (Multi) [C61])    Surgeons: Moncho Nunes MD Responsible Provider: Lizbeth Yi MD MPH    Anesthesia Type: MAC ASA Status: 3            Anesthesia Type: MAC    Vitals Value Taken Time   /64 05/06/25 07:45   Temp 35.9 °C (96.6 °F) 05/06/25 07:30   Pulse 65 05/06/25 07:45   Resp 15 05/06/25 07:45   SpO2 92 % 05/06/25 07:45       Anesthesia Post Evaluation    Patient location during evaluation: PACU  Patient participation: complete - patient participated  Level of consciousness: awake and alert  Pain score: 0  Pain management: adequate  Multimodal analgesia pain management approach  Airway patency: patent  Two or more strategies used to mitigate risk of obstructive sleep apnea  Cardiovascular status: acceptable  Respiratory status: acceptable  Hydration status: acceptable  Postoperative Nausea and Vomiting: none        No notable events documented.

## 2025-05-06 NOTE — NURSING NOTE
Patient in Phase 2; dressed and up to chair with RN assist. Tolerating po fluids, no complaint of pain and no complaint of nausea.     Family at bedside; discussed discharge instructions with patient and Family. All questions at this time answered.     Discharge instructions provided using teachback method.  Patient's health-related risk factors discussed with patient.  Patient educated to look for worsening signs and symptoms and educated to seek medical attention if experiencing medical emergency.  Patient aware of needs to follow up with outpatient clinics as scheduled. Home going meds reviewed with patient.  Patient verbalized understanding of disposition and discharge instructions.  All questions answered to patient's satisfaction and within nursing scope of practice.  Vitals stable; IV(s) removed.    Patient clinically appropriate for discharge. IV removed and patient transported to discharge area via wheelchair.

## 2025-05-06 NOTE — ANESTHESIA PREPROCEDURE EVALUATION
Patient: Salazar Jesus    Procedure Information       Anesthesia Start Date/Time: 05/06/25 0704    Procedure: Insertion Fiducial Marker Prostate    Location: [facility] / [facility]    Surgeons: Moncho Nunes MD            Relevant Problems   Anesthesia (within normal limits)      Cardiac   (+) Essential hypertension   (+) HLD (hyperlipidemia)      Pulmonary   (+) ALLISON (obstructive sleep apnea)      Neuro (within normal limits)      GI   (+) Gastroesophageal reflux disease      /Renal   (+) Benign prostatic hyperplasia with urinary obstruction   (+) ESRD (end stage renal disease) (Multi)   (+) End-stage renal disease on hemodialysis (Multi) (MWF, last yesterday)   (+) Prostate cancer (Multi)      Liver (within normal limits)      Endocrine   (+) Hypothyroidism   (+) Secondary hyperparathyroidism of renal origin (Multi)      Hematology   (+) Anemia of chronic disease      Musculoskeletal (within normal limits)      HEENT   (+) Bilateral sensorineural hearing loss   (+) Hearing deficit      ID   (+) Shingles      Skin (within normal limits)      GYN (within normal limits)     Past Surgical History:   Procedure Laterality Date    CATARACT EXTRACTION      KNEE CARTILAGE SURGERY Left     Knee surgery    ROTATOR CUFF REPAIR Right 2017    THROAT SURGERY      w/ broken jaw    WISDOM TOOTH EXTRACTION Bilateral        Clinical information reviewed:   Tobacco  Allergies  Meds   Med Hx  Surg Hx   Fam Hx  Soc Hx        NPO Detail:  NPO/Void Status  Date of Last Liquid: 05/06/25  Time of Last Liquid: 0500 (water with meds)  Date of Last Solid: 05/05/25  Time of Last Solid: 2100  Time of Last Void: 0530      Lab Results   Component Value Date    WBC 4.9 04/01/2025    HGB 11.4 (L) 04/01/2025    HCT 33.4 (L) 04/01/2025    MCV 99.1 04/01/2025     (L) 04/01/2025       Chemistry    Lab Results   Component Value Date/Time     04/29/2025 1118     04/01/2025 1415    K 4.3 04/29/2025 1118    K 4.2  04/01/2025 1415    CL 96 (L) 04/29/2025 1118    CL 97 (L) 04/01/2025 1415    CO2 31 04/29/2025 1118    CO2 28 04/01/2025 1415    BUN 23 04/29/2025 1118    BUN 22 04/01/2025 1415    CREATININE 4.73 (H) 04/29/2025 1118    CREATININE 5.23 (H) 04/01/2025 1415    Lab Results   Component Value Date/Time    CALCIUM 10.2 04/29/2025 1118    CALCIUM 9.9 04/01/2025 1415    ALKPHOS 66 01/07/2025 1235    AST 16 01/07/2025 1235    ALT 14 01/07/2025 1235    BILITOT 0.6 01/07/2025 1235             Physical Exam    Airway  Mallampati: II  TM distance: >3 FB  Neck ROM: full     Cardiovascular    Dental    Pulmonary    Abdominal            Anesthesia Plan    History of general anesthesia?: yes  History of complications of general anesthesia?: no    ASA 3     MAC     The patient is not a current smoker.  Patient was not previously instructed to abstain from smoking on day of procedure.  Patient did not smoke on day of procedure.  Education provided regarding risk of obstructive sleep apnea.  intravenous induction   Anesthetic plan and risks discussed with patient.  Use of blood products discussed with patient who.    Plan discussed with CRNA and CAA.

## 2025-05-06 NOTE — H&P
"History Of Present Illness  Salazar Jesus is a 75 y.o. male presenting with prostate cancer.     Past Medical History  Medical History[1]    Surgical History  Surgical History[2]     Social History  He reports that he quit smoking about 33 years ago. His smoking use included cigarettes. He has never used smokeless tobacco. He reports current alcohol use. He reports that he does not use drugs.    Family History  Family History[3]     Allergies  Allopurinol    Review of Systems     Physical Exam     Last Recorded Vitals  Blood pressure 135/73, pulse 61, temperature 36.5 °C (97.7 °F), temperature source Temporal, resp. rate 14, height 1.651 m (5' 5\"), weight 74 kg (163 lb 2.3 oz), SpO2 97%.    Relevant Results             Assessment & Plan  Prostate cancer (Multi)      Spaceoar and fiducial placement    I spent  minutes in the professional and overall care of this patient.      Moncho Nunes MD         [1]   Past Medical History:  Diagnosis Date    BPH (benign prostatic hyperplasia)     Cataract     Chronic kidney disease     ESRD - Dialysis 3 x a week    Dental crown present     loose; on upper left incisor    ESRD (end stage renal disease) on dialysis (Multi)     Dialysis MWF    GERD (gastroesophageal reflux disease)     Gout     Hemodialysis status     Lt arm fistula    HL (hearing loss)     HTN (hypertension)     Hyperlipidemia     Hypothyroidism     ALLISON on CPAP    [2]   Past Surgical History:  Procedure Laterality Date    CATARACT EXTRACTION      KNEE CARTILAGE SURGERY Left     Knee surgery    ROTATOR CUFF REPAIR Right 2017    THROAT SURGERY      w/ broken jaw    WISDOM TOOTH EXTRACTION Bilateral    [3]   Family History  Problem Relation Name Age of Onset    Heart disease Mother      Stroke Father      Parkinsonism Brother       "

## 2025-05-06 NOTE — DISCHARGE INSTRUCTIONS
Dr. Nunes - [facility]  Phone: [phone]    Follow-Up Information  For patients receiving Fiducial Markers, please follow up with Radiology Oncology as scheduled for Planning.    Medication  Please take the medications as prescribed by your doctor. Tylenol or non-steroids! anti-inflammatory medications (such as Aleve®) should relieve mild pain and discomfort. Resume the usual medications you took before surgery unless instructed otherwise.    Resuming Activities and Driving  *NO Driving on the day of surgery  *You may resume driving the day after surgery  *You may resume normal activities as tolerated  *You may shower     Diet  You may resume your normal diet once at home, with no additional considerations.    Expected Signs and Symptoms  You may have a small amount of bleeding with urination on occasion. This may be accompanied with small blood clots. This is normal and should be relieved by increasing your fluid intake.  You may experience some mild burning and discomfort during urination. This is normal and should subside in one to two weeks.      When to Call Your Doctor - Dr. Nunes [phone]  Please call the office immediately if any of the following symptoms appear:    *Inability to eat, drink, or take medication  *Persistent Nausea or Vomiting  *Fever over 100.4 degrees F. (38 degrees C.)    Please call 9-1-1 if you experience any of the following:  *Chest Pain, Shortness of Breath or Difficulty Breathing  *Sudden one sided weakness/slurred speech/ any signs or symptoms of a stroke  *Severe headache or visual disturbance    Additional Home-Going Instructions for Adults Who Have Had Anesthesia or Sedatives:    The anesthetics, sedatives and pain killers which were given to you will be acting in your body for the next 24 hours.  This may cause you to feel sleepy.  This feeling will slowly wear off.    For the next 24 hours you SHOULD NOT:  *Drive a car, or operate machinery or power  tools  *Drink any form of alcohol (including beer or wine)  *Make any important Decisions

## 2025-05-06 NOTE — OP NOTE
Insertion Fiducial Marker Prostate Operative Note     Date: 2025  OR Location: [facility]    Name: Salazar Jesus, : 1950, Age: 75 y.o., MRN: [id number], Sex: male    Diagnosis  Pre-op Diagnosis      * Prostate cancer (Multi) [C61] Post-op Diagnosis     * Prostate cancer (Multi) [C61]     Procedures  Insertion Fiducial Marker Prostate  32485 - KS PLMT INTERSTITIAL DEV RADIAT TX PROSTATE 1/MULT    KS TRANSPERINEAL PLMT BIODEGRADABLE MATRL 1/MLT NJX [[id number]]  Surgeons      * Moncho Nunes - Primary    Resident/Fellow/Other Assistant:  Surgeons and Role:  * No surgeons found with a matching role *         Preoperative Diagnosis  Prostate cancer.       Postoperative Diagnosis  Same.       Procedure Performed  SpaceOAR and Fiducial placement, Transrectal ultrasound .   Surgeon  Moncho Nunes MD ([id number]) - Urology.   Assisted by  N/A.      Details of Procedure     Anesthesia  MAC.   Estimated Blood Loss (ml)  5.   Specimen(s) Removed  None.   Drain(s)  None.   Implant(s)  None.   Complications  None.   Indications (History)  Prostate cancer.   Findings of Procedure  16g heterogenous. Patient coughed during injection.   Description of Procedure  After administration of anesthesia and antibiotics, the patient was placed in the dorsal lithotomy position and prepped and draped in the usual sterile fashion. A prostate block was performed and transrectal ultrasound using a stepper. The prostate was measured. There were some hypoechoic areas. Fiducials were placed at the base, apex and mid prostate. The perirectal fat was identified using the finder needle with excellent dispersion of saline at the mid prostate. SpaceOAR Ramon was then placed in this space. Excellent placement was confirmed in both the transverse and saggital planes. There was no puncture of the rectal wall during the procedure. .

## 2025-05-08 ENCOUNTER — SPECIALTY PHARMACY (OUTPATIENT)
Dept: PHARMACY | Facility: CLINIC | Age: 75
End: 2025-05-08

## 2025-05-10 PROCEDURE — RXMED WILLOW AMBULATORY MEDICATION CHARGE

## 2025-05-13 ENCOUNTER — SPECIALTY PHARMACY (OUTPATIENT)
Dept: PHARMACY | Facility: CLINIC | Age: 75
End: 2025-05-13

## 2025-05-14 DIAGNOSIS — M54.6 CHRONIC LEFT-SIDED THORACIC BACK PAIN: ICD-10-CM

## 2025-05-14 DIAGNOSIS — G89.29 CHRONIC LEFT-SIDED THORACIC BACK PAIN: ICD-10-CM

## 2025-05-14 RX ORDER — CYCLOBENZAPRINE HCL 5 MG
TABLET ORAL
Qty: 30 TABLET | Refills: 0 | Status: SHIPPED | OUTPATIENT
Start: 2025-05-14

## 2025-05-16 LAB
T3FREE SERPL-MCNC: 2 PG/ML (ref 2.3–4.2)
T4 FREE SERPL-MCNC: 1.4 NG/DL (ref 0.8–1.8)
TSH SERPL-ACNC: 7.57 MIU/L (ref 0.4–4.5)

## 2025-05-19 ENCOUNTER — PHARMACY VISIT (OUTPATIENT)
Dept: PHARMACY | Facility: CLINIC | Age: 75
End: 2025-05-19
Payer: MEDICARE

## 2025-05-20 ENCOUNTER — TELEPHONE (OUTPATIENT)
Dept: PRIMARY CARE | Facility: CLINIC | Age: 75
End: 2025-05-20
Payer: MEDICARE

## 2025-05-20 ENCOUNTER — SPECIALTY PHARMACY (OUTPATIENT)
Dept: PHARMACY | Facility: CLINIC | Age: 75
End: 2025-05-20

## 2025-05-20 NOTE — PROGRESS NOTES
"[facility] Clinical Note  Patient Reassessment     Introduction  Salazar Jesus is a 75 y.o. male who is on the specialty pharmacy service for management of: Oncology Core.      [facility] supplied medication: Orgovyx    Duration of therapy: Maintenance    The most recent encounter visit with the referring prescriber Milly Betancourt MD PhD on 03/21/2025 was reviewed.  Pharmacy will continue to collaborate in the care of this patient with the referring prescriber.    Discussion  Salazar was contacted on 5/20/2025 at 2:53 PM for a pharmacy visit with encounter number [phone] from:   [facility]  [address] [address]  Dept: [phone]  Dept Fax: [phone]  Salazar consented to a/an Telephone visit, which was performed.    Efficacy  Patient has developed new symptoms of condition: No  Patient/caregiver feels medication is affecting the disease state: Yes    Goals  Provided education on goals and possible outcomes of therapy:  Adherence with therapy  Timely completion of appropriate labs  Timely and appropriate follow up with provider  Identify and address medication interactions with presciption medications, OTC medications and supplements  Optimize or maintain quality of life  Oncology: Prolong life/No disease progression  Manage side effects (ex: nausea/vomiting, constipation, fatigue) in conjunction with care team  Patient has documented target(s) for goals of therapy: Yes    Tolerance  Patient has experienced side effects from this medication: Yes - fatigue, dizziness, feeling \"off balance\", left-sided thoracic back pain   Changes to current therapy regimen: No    The follow-up timeline was discussed. Every person responds to and reacts to therapy differently. Patient should be assessed for efficacy and tolerability in approximately: 3 months    Adherence  Patient Information  Informant: Self (Patient)  Demonstrates Understanding " of Importance of Adherence: Yes  Does the patient have any barriers to self-administration (including physical and mental?): No  Barriers to Self-Administration: none  Medication Information  Medication: relugolix (Orgovyx)  Patient Reported Missed Doses in the Last 4 Weeks: 0  Estimated Medication Adherence Level: Good  Adherence Estimation Source: Claims history  Barriers to Adherence: No Problems identified  What concerns do you have regarding your medications?: none   The importance of adherence was discussed and patient/caregiver was advised to take the medication as prescribed by their provider. Encouraged patient/caregiver to call physician's office or specialty pharmacy if they have a question regarding a missed dose.    General Assessment  Changes to home medications, OTCs or supplements: Yes - was recently prescribed cyclobenzaprine for his left-sided thoracic back pain   Current Medications[1]  Reported new allergies: No  Reported new medical conditions: No  Additional monitoring reviewed: Oncology - CBC-diff:   Lab Results   Component Value Date    WBC 4.9 04/01/2025    RBC 3.37 (L) 04/01/2025    HGB 11.4 (L) 04/01/2025    HCT 33.4 (L) 04/01/2025    MCV 99.1 04/01/2025    MCHC 34.1 04/01/2025     (L) 04/01/2025    RDW 14.7 04/01/2025    NEUTOPHILPCT 64.3 01/07/2025    IGPCT 0.4 01/07/2025    LYMPHOPCT 22.7 01/07/2025    MONOPCT 10.1 01/07/2025    EOSPCT 2.3 01/07/2025    BASOPCT 0.2 01/07/2025    NEUTROABS 3.05 01/07/2025    LYMPHSABS 1.08 01/07/2025    MONOSABS 0.48 01/07/2025    EOSABS 0.11 01/07/2025    BASOSABS 0.01 01/07/2025   , CMP:   Lab Results   Component Value Date    GLUCOSE 90 04/29/2025     04/29/2025    K 4.3 04/29/2025    CL 96 (L) 04/29/2025    CO2 31 04/29/2025    ANIONGAP 15 04/29/2025    BUN 23 04/29/2025    CREATININE 4.73 (H) 04/29/2025    CALCIUM 10.2 04/29/2025    ALBUMIN 4.5 01/07/2025    ALKPHOS 66 01/07/2025    PROT 6.5 01/07/2025    AST 16 01/07/2025    BILITOT  0.6 01/07/2025    ALT 14 01/07/2025   , PSA:   Lab Results   Component Value Date    PSA 5.7 (H) 08/21/2024    PSAFREE 1.3 08/21/2024   , and Testosterone:   Lab Results   Component Value Date    TESTOSTERONE 242 01/07/2025     Is laboratory follow up needed? No    Advised to contact the pharmacy if there are any changes to the patient's medication list, including prescriptions, OTC medications, herbal products, or supplements.    Impression/Plan  This patient has been identified as high risk due to Geriatric (over 65 years of age).  The following action was taken: N/A.    QOL/Patient Satisfaction  Rate your quality of life on scale of 1-10: 7  Rate your satisfaction with  Specialty Pharmacy on scale of 1-10: 10 - Completely satisfied    Provided contact information ([phone]) for [facility] and reviewed dispensing process, refill timeline and patient management follow up. Confirmed understanding of education conducted during assessment. All questions and concerns were addressed and patient/caregiver was encouraged to reach out for additional questions or concerns.    Based on the patient's diagnosis, medication list, progress towards goals, adherence, tolerance, and medication list, medication remains appropriate: Therapy remains appropriate (I attest)    Rashmi Robins, PharmD        [1]   Current Outpatient Medications   Medication Sig Dispense Refill    cyclobenzaprine (Flexeril) 5 mg tablet TAKE 1 TABLET BY MOUTH AT BEDTIME AS NEEDED FOR MUSCLE SPASM 30 tablet 0    alfuzosin (Uroxatral) 10 mg 24 hr tablet Take 1 tablet (10 mg) by mouth once daily. Do not crush, chew, or split.      amLODIPine (Norvasc) 10 mg tablet Take 1 tablet (10 mg) by mouth once daily. 90 tablet 1    aspirin 81 mg EC tablet Take 1 tablet (81 mg) by mouth once daily.      doxazosin (Cardura) 1 mg tablet Take 1 tablet (1 mg) by mouth once daily at bedtime.      esomeprazole (NexIUM) 20 mg DR capsule Take 1  capsule (20 mg) by mouth once daily in the morning. Take before meals. Do not open capsule.      febuxostat (Uloric) 40 mg tablet Take 1 tablet (40 mg) by mouth once daily. 90 tablet 1    hydrocortisone 2.5 % cream once daily as needed for rash.      labetalol (Normodyne) 100 mg tablet Take 1.5 tablets (150 mg) by mouth 2 times a day. 270 tablet 1    levothyroxine (Synthroid, Levoxyl) 175 mcg tablet Take 1 tablet (175 mcg) by mouth early in the morning.. Take on an empty stomach at the same time each day, either 30 to 60 minutes prior to breakfast 30 tablet 11    LORazepam (Ativan) 0.5 mg tablet Take 1 tablet (0.5 mg) by mouth 1 time if needed for anxiety for up to 2 doses. 2 tablet 0    relugolix (Orgovyx) 120 mg tablet Take 1 tablet (120 mg total) by mouth once daily.  If more than 7 days in a row are missed, take 3 tablets by mouth once before going back to 1 tablet per day 30 tablet 16    rosuvastatin (Crestor) 40 mg tablet Take 1 tablet (40 mg) by mouth once daily. 90 tablet 1    sevelamer carbonate (Renvela) 800 mg tablet Take 1 tablet (800 mg) by mouth 3 times daily (morning, midday, late afternoon). Swallow tablet whole; do not crush, break, or chew.      Vitamin D3 50 mcg (2,000 unit) capsule TAKE 1 CAPSULE BY MOUTH EVERY DAY 90 capsule 3     No current facility-administered medications for this visit.

## 2025-05-20 NOTE — TELEPHONE ENCOUNTER
Patient is requesting a referral to see an orthopedic back surgeon and would like us to fax the referral to a Dr.Jackson Ruiz

## 2025-05-23 DIAGNOSIS — C61 PROSTATE CANCER (MULTI): Primary | ICD-10-CM

## 2025-05-27 ENCOUNTER — HOSPITAL ENCOUNTER (OUTPATIENT)
Dept: RADIATION ONCOLOGY | Facility: HOSPITAL | Age: 75
Setting detail: RADIATION/ONCOLOGY SERIES
Discharge: HOME | End: 2025-05-27
Payer: MEDICARE

## 2025-05-27 ENCOUNTER — LAB (OUTPATIENT)
Dept: LAB | Facility: HOSPITAL | Age: 75
End: 2025-05-27
Payer: MEDICARE

## 2025-05-27 ENCOUNTER — HOSPITAL ENCOUNTER (OUTPATIENT)
Dept: RADIOLOGY | Facility: HOSPITAL | Age: 75
Discharge: HOME | End: 2025-05-27
Payer: MEDICARE

## 2025-05-27 ENCOUNTER — HOSPITAL ENCOUNTER (OUTPATIENT)
Dept: RADIOLOGY | Facility: EXTERNAL LOCATION | Age: 75
Discharge: HOME | End: 2025-05-27

## 2025-05-27 DIAGNOSIS — C61 MALIGNANT NEOPLASM OF PROSTATE (MULTI): ICD-10-CM

## 2025-05-27 DIAGNOSIS — C61 MALIGNANT NEOPLASM OF PROSTATE (MULTI): Primary | ICD-10-CM

## 2025-05-27 DIAGNOSIS — C61 PROSTATE CANCER (MULTI): ICD-10-CM

## 2025-05-27 LAB
HOLD SPECIMEN: NORMAL

## 2025-05-27 PROCEDURE — 36415 COLL VENOUS BLD VENIPUNCTURE: CPT

## 2025-05-27 PROCEDURE — 77290 THER RAD SIMULAJ FIELD CPLX: CPT | Performed by: STUDENT IN AN ORGANIZED HEALTH CARE EDUCATION/TRAINING PROGRAM

## 2025-05-27 PROCEDURE — 77334 RADIATION TREATMENT AID(S): CPT | Performed by: STUDENT IN AN ORGANIZED HEALTH CARE EDUCATION/TRAINING PROGRAM

## 2025-05-28 ENCOUNTER — DOCUMENTATION (OUTPATIENT)
Dept: RESEARCH | Age: 75
End: 2025-05-28
Payer: MEDICARE

## 2025-05-28 NOTE — RESEARCH NOTES
STUDY: NRG-  VISIT: Baseline    Clinical Research Specialist saw patient in clinic yesterday, 5/27.    QOLs completed by patient without assistance.     Baseline correlative blood and urine samples were collected per protocol.    Next appointment and contact information provided.    All questions answered to patient satisfaction.    SUMMER AMBROCIO  05/28/2025

## 2025-06-05 ENCOUNTER — HOSPITAL ENCOUNTER (OUTPATIENT)
Dept: RADIATION ONCOLOGY | Facility: HOSPITAL | Age: 75
Setting detail: RADIATION/ONCOLOGY SERIES
Discharge: HOME | End: 2025-06-05
Payer: MEDICARE

## 2025-06-05 DIAGNOSIS — C61 MALIGNANT NEOPLASM OF PROSTATE (MULTI): Primary | ICD-10-CM

## 2025-06-05 DIAGNOSIS — F40.240 CLAUSTROPHOBIA: ICD-10-CM

## 2025-06-05 PROCEDURE — 77300 RADIATION THERAPY DOSE PLAN: CPT | Performed by: STUDENT IN AN ORGANIZED HEALTH CARE EDUCATION/TRAINING PROGRAM

## 2025-06-05 PROCEDURE — 77334 RADIATION TREATMENT AID(S): CPT | Performed by: STUDENT IN AN ORGANIZED HEALTH CARE EDUCATION/TRAINING PROGRAM

## 2025-06-05 PROCEDURE — 77295 3-D RADIOTHERAPY PLAN: CPT | Performed by: STUDENT IN AN ORGANIZED HEALTH CARE EDUCATION/TRAINING PROGRAM

## 2025-06-05 PROCEDURE — 77370 RADIATION PHYSICS CONSULT: CPT | Mod: 59 | Performed by: STUDENT IN AN ORGANIZED HEALTH CARE EDUCATION/TRAINING PROGRAM

## 2025-06-05 RX ORDER — LORAZEPAM 0.5 MG/1
0.5 TABLET ORAL ONCE
Status: DISCONTINUED | OUTPATIENT
Start: 2025-06-05 | End: 2025-06-05

## 2025-06-05 RX ORDER — LORAZEPAM 0.5 MG/1
0.5 TABLET ORAL DAILY
Qty: 6 TABLET | Refills: 0 | Status: SHIPPED | OUTPATIENT
Start: 2025-06-05 | End: 2025-06-11

## 2025-06-07 ENCOUNTER — SPECIALTY PHARMACY (OUTPATIENT)
Dept: PHARMACY | Facility: CLINIC | Age: 75
End: 2025-06-07

## 2025-06-10 ENCOUNTER — DOCUMENTATION (OUTPATIENT)
Dept: RESEARCH | Age: 75
End: 2025-06-10
Payer: MEDICARE

## 2025-06-10 ENCOUNTER — LAB (OUTPATIENT)
Dept: LAB | Facility: HOSPITAL | Age: 75
End: 2025-06-10
Payer: MEDICARE

## 2025-06-10 ENCOUNTER — HOSPITAL ENCOUNTER (OUTPATIENT)
Dept: RADIATION ONCOLOGY | Facility: HOSPITAL | Age: 75
Setting detail: RADIATION/ONCOLOGY SERIES
Discharge: HOME | End: 2025-06-10
Payer: MEDICARE

## 2025-06-10 VITALS
DIASTOLIC BLOOD PRESSURE: 82 MMHG | OXYGEN SATURATION: 96 % | RESPIRATION RATE: 18 BRPM | SYSTOLIC BLOOD PRESSURE: 123 MMHG | HEART RATE: 64 BPM | BODY MASS INDEX: 28.06 KG/M2 | WEIGHT: 168.6 LBS | TEMPERATURE: 97.3 F

## 2025-06-10 DIAGNOSIS — C61 MALIGNANT NEOPLASM OF PROSTATE (MULTI): ICD-10-CM

## 2025-06-10 DIAGNOSIS — R35.1 BPH ASSOCIATED WITH NOCTURIA: ICD-10-CM

## 2025-06-10 DIAGNOSIS — C61 PROSTATE CANCER (MULTI): Primary | ICD-10-CM

## 2025-06-10 DIAGNOSIS — N40.1 BPH ASSOCIATED WITH NOCTURIA: ICD-10-CM

## 2025-06-10 DIAGNOSIS — Z01.818 PREOP TESTING: ICD-10-CM

## 2025-06-10 DIAGNOSIS — C61 PROSTATE CANCER (MULTI): ICD-10-CM

## 2025-06-10 DIAGNOSIS — Z51.0 ENCOUNTER FOR ANTINEOPLASTIC RADIATION THERAPY: ICD-10-CM

## 2025-06-10 DIAGNOSIS — N32.89 OTHER SPECIFIED DISORDERS OF BLADDER: ICD-10-CM

## 2025-06-10 LAB
INR PPP: 1 (ref 0.9–1.1)
PROTHROMBIN TIME: 10.6 SECONDS (ref 9.8–12.4)
PSA SERPL-MCNC: 0.29 NG/ML
RAD ONC MSQ ACTUAL FRACTIONS DELIVERED: 1
RAD ONC MSQ ACTUAL SESSION DELIVERED DOSE: 800 CGRAY
RAD ONC MSQ ACTUAL TOTAL DOSE: 800 CGRAY
RAD ONC MSQ ELAPSED DAYS: 0
RAD ONC MSQ LAST DATE: NORMAL
RAD ONC MSQ PRESCRIBED FRACTIONAL DOSE: 800 CGRAY
RAD ONC MSQ PRESCRIBED NUMBER OF FRACTIONS: 5
RAD ONC MSQ PRESCRIBED TECHNIQUE: NORMAL
RAD ONC MSQ PRESCRIBED TOTAL DOSE: 4000 CGRAY
RAD ONC MSQ PRESCRIPTION PATTERN COMMENT: NORMAL
RAD ONC MSQ START DATE: NORMAL
RAD ONC MSQ TREATMENT COURSE NUMBER: 1
RAD ONC MSQ TREATMENT SITE: NORMAL
T4 FREE SERPL-MCNC: 1.36 NG/DL (ref 0.78–1.48)
TSH SERPL-ACNC: 9.81 MIU/L (ref 0.44–3.98)

## 2025-06-10 PROCEDURE — 85610 PROTHROMBIN TIME: CPT

## 2025-06-10 PROCEDURE — 84443 ASSAY THYROID STIM HORMONE: CPT | Performed by: STUDENT IN AN ORGANIZED HEALTH CARE EDUCATION/TRAINING PROGRAM

## 2025-06-10 PROCEDURE — 84270 ASSAY OF SEX HORMONE GLOBUL: CPT

## 2025-06-10 PROCEDURE — 77280 THER RAD SIMULAJ FIELD SMPL: CPT | Performed by: STUDENT IN AN ORGANIZED HEALTH CARE EDUCATION/TRAINING PROGRAM

## 2025-06-10 PROCEDURE — 77373 STRTCTC BDY RAD THER TX DLVR: CPT | Performed by: STUDENT IN AN ORGANIZED HEALTH CARE EDUCATION/TRAINING PROGRAM

## 2025-06-10 PROCEDURE — 36415 COLL VENOUS BLD VENIPUNCTURE: CPT

## 2025-06-10 PROCEDURE — 84153 ASSAY OF PSA TOTAL: CPT

## 2025-06-10 PROCEDURE — 84439 ASSAY OF FREE THYROXINE: CPT | Performed by: STUDENT IN AN ORGANIZED HEALTH CARE EDUCATION/TRAINING PROGRAM
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
## 2025-06-10 NOTE — RESEARCH NOTES
STUDY: NRG-  VISIT: On Treatment Visit    Clinical Research Specialist saw patient in clinic today.    Adverse Events and Concomitant Medications reviewed.    Next appointment and contact information provided.    All questions answered to patient satisfaction.    SUMMER AMBROCIO  06/10/2025

## 2025-06-11 ENCOUNTER — APPOINTMENT (OUTPATIENT)
Dept: UROLOGY | Facility: CLINIC | Age: 75
End: 2025-06-11
Payer: MEDICARE

## 2025-06-11 ENCOUNTER — SPECIALTY PHARMACY (OUTPATIENT)
Dept: PHARMACY | Facility: CLINIC | Age: 75
End: 2025-06-11

## 2025-06-11 VITALS — TEMPERATURE: 98 F

## 2025-06-11 DIAGNOSIS — C61 PROSTATE CANCER (MULTI): ICD-10-CM

## 2025-06-11 DIAGNOSIS — R35.1 BPH ASSOCIATED WITH NOCTURIA: Primary | ICD-10-CM

## 2025-06-11 DIAGNOSIS — E03.9 HYPOTHYROIDISM, UNSPECIFIED TYPE: Primary | ICD-10-CM

## 2025-06-11 DIAGNOSIS — N40.1 BPH ASSOCIATED WITH NOCTURIA: Primary | ICD-10-CM

## 2025-06-11 LAB — HOLD SPECIMEN: NORMAL

## 2025-06-11 PROCEDURE — G2211 COMPLEX E/M VISIT ADD ON: HCPCS | Performed by: UROLOGY

## 2025-06-11 PROCEDURE — 51741 ELECTRO-UROFLOWMETRY FIRST: CPT | Performed by: UROLOGY

## 2025-06-11 PROCEDURE — 1126F AMNT PAIN NOTED NONE PRSNT: CPT | Performed by: UROLOGY

## 2025-06-11 PROCEDURE — 1159F MED LIST DOCD IN RCRD: CPT | Performed by: UROLOGY

## 2025-06-11 PROCEDURE — 1036F TOBACCO NON-USER: CPT | Performed by: UROLOGY

## 2025-06-11 PROCEDURE — 99213 OFFICE O/P EST LOW 20 MIN: CPT | Performed by: UROLOGY

## 2025-06-11 PROCEDURE — 51798 US URINE CAPACITY MEASURE: CPT | Performed by: UROLOGY

## 2025-06-11 RX ORDER — SODIUM BICARBONATE 650 MG/1
1 TABLET ORAL
COMMUNITY
Start: 2025-05-04

## 2025-06-11 ASSESSMENT — PAIN SCALES - GENERAL: PAINLEVEL_OUTOF10: 0-NO PAIN

## 2025-06-11 NOTE — PROGRESS NOTES
Salazar Jesus is a 75 y.o. male with history of  BPH s/p GreenLight laser PVP on 2/27/2025 and [place] 7 prostate cancer diagnosed on 10/15/2024, started RT on 6/10 and is currently on ADT. He resents for 3 month post-op visit. He reports resolution of all obstructive voiding symptoms. He has persistent urinary frequency, urgency and nocturia x4.           IPSS: 20 and 3  PVR: 16 ml  Uroflow: volume voided 110 ml, Q max 11.7 ml/min    Medications Ordered Prior to Encounter[1]  Lab Results   Component Value Date    PSA 5.7 (H) 08/21/2024    PSA 4.40 (H) 08/03/2023    PSA 3.38 06/08/2022       Assessment/Plan   There are no diagnoses linked to this encounter.      Plan:     BPH s/p GreenLight laser PVP on 2/27/2025  OAB  [place] 7 prostate cancer     I explained to the patient that overactive bladder symptoms sometimes take 6-12 months to resolve following a BPH procedure. OAB medication is available, but I prefer observation to monitor for spontaneous resolution.    Patient received first RT on 6/10 and is currently on ADT.    We will follow up virtually in 3 months to re-evaluate his symptoms and determine candidacy for OAB medications.       All questions were answered to the patient's satisfaction. Patient agrees with the plan and wishes to proceed. Follow-up will be scheduled appropriately.     E&M visit today is associated with current or anticipated ongoing medical care services related to a patient's single, serious condition or a complex condition.    Scribed for Dr. Singh by Nikki Wood. I , Dr Singh, have personally reviewed and agreed with the information entered by the Virtual Scribe.          [1]   Current Outpatient Medications on File Prior to Visit   Medication Sig Dispense Refill    cyclobenzaprine (Flexeril) 5 mg tablet TAKE 1 TABLET BY MOUTH AT BEDTIME AS NEEDED FOR MUSCLE SPASM (Patient not taking: Reported on 6/10/2025) 30 tablet 0    alfuzosin (Uroxatral) 10 mg 24 hr tablet Take 1 tablet  (10 mg) by mouth once daily. Do not crush, chew, or split.      amLODIPine (Norvasc) 10 mg tablet Take 1 tablet (10 mg) by mouth once daily. 90 tablet 1    aspirin 81 mg EC tablet Take 1 tablet (81 mg) by mouth once daily.      doxazosin (Cardura) 1 mg tablet Take 1 tablet (1 mg) by mouth once daily at bedtime.      esomeprazole (NexIUM) 20 mg DR capsule Take 1 capsule (20 mg) by mouth once daily in the morning. Take before meals. Do not open capsule.      febuxostat (Uloric) 40 mg tablet Take 1 tablet (40 mg) by mouth once daily. 90 tablet 1    hydrocortisone 2.5 % cream once daily as needed for rash.      labetalol (Normodyne) 100 mg tablet Take 1.5 tablets (150 mg) by mouth 2 times a day. 270 tablet 1    levothyroxine (Synthroid, Levoxyl) 175 mcg tablet Take 1 tablet (175 mcg) by mouth early in the morning.. Take on an empty stomach at the same time each day, either 30 to 60 minutes prior to breakfast 30 tablet 11    LORazepam (Ativan) 0.5 mg tablet Take 1 tablet (0.5 mg) by mouth once daily for 6 days. 6 tablet 0    relugolix (Orgovyx) 120 mg tablet Take 1 tablet (120 mg total) by mouth once daily.  If more than 7 days in a row are missed, take 3 tablets by mouth once before going back to 1 tablet per day 30 tablet 16    rosuvastatin (Crestor) 40 mg tablet Take 1 tablet (40 mg) by mouth once daily. 90 tablet 1    sevelamer carbonate (Renvela) 800 mg tablet Take 1 tablet (800 mg) by mouth 3 times daily (morning, midday, late afternoon). Swallow tablet whole; do not crush, break, or chew.      Vitamin D3 50 mcg (2,000 unit) capsule TAKE 1 CAPSULE BY MOUTH EVERY DAY 90 capsule 3    [DISCONTINUED] LORazepam (Ativan) 0.5 mg tablet Take 1 tablet (0.5 mg) by mouth 1 time if needed for anxiety for up to 2 doses. 2 tablet 0     No current facility-administered medications on file prior to visit.

## 2025-06-12 ENCOUNTER — HOSPITAL ENCOUNTER (OUTPATIENT)
Dept: RADIATION ONCOLOGY | Facility: HOSPITAL | Age: 75
Setting detail: RADIATION/ONCOLOGY SERIES
Discharge: HOME | End: 2025-06-12
Payer: MEDICARE

## 2025-06-12 DIAGNOSIS — Z51.0 ENCOUNTER FOR ANTINEOPLASTIC RADIATION THERAPY: ICD-10-CM

## 2025-06-12 DIAGNOSIS — C61 MALIGNANT NEOPLASM OF PROSTATE (MULTI): ICD-10-CM

## 2025-06-12 LAB
RAD ONC MSQ ACTUAL FRACTIONS DELIVERED: 2
RAD ONC MSQ ACTUAL SESSION DELIVERED DOSE: 800 CGRAY
RAD ONC MSQ ACTUAL TOTAL DOSE: 1600 CGRAY
RAD ONC MSQ ELAPSED DAYS: 2
RAD ONC MSQ LAST DATE: NORMAL
RAD ONC MSQ PRESCRIBED FRACTIONAL DOSE: 800 CGRAY
RAD ONC MSQ PRESCRIBED NUMBER OF FRACTIONS: 5
RAD ONC MSQ PRESCRIBED TECHNIQUE: NORMAL
RAD ONC MSQ PRESCRIBED TOTAL DOSE: 4000 CGRAY
RAD ONC MSQ PRESCRIPTION PATTERN COMMENT: NORMAL
RAD ONC MSQ START DATE: NORMAL
RAD ONC MSQ TREATMENT COURSE NUMBER: 1
RAD ONC MSQ TREATMENT SITE: NORMAL

## 2025-06-12 PROCEDURE — 77373 STRTCTC BDY RAD THER TX DLVR: CPT | Performed by: STUDENT IN AN ORGANIZED HEALTH CARE EDUCATION/TRAINING PROGRAM

## 2025-06-12 PROCEDURE — RXMED WILLOW AMBULATORY MEDICATION CHARGE

## 2025-06-14 LAB — TESTOSTERONE,TOTAL,LC-MS/MS: 10 NG/DL (ref 250–1100)

## 2025-06-17 ENCOUNTER — HOSPITAL ENCOUNTER (OUTPATIENT)
Dept: RADIATION ONCOLOGY | Facility: HOSPITAL | Age: 75
Setting detail: RADIATION/ONCOLOGY SERIES
Discharge: HOME | End: 2025-06-17
Payer: MEDICARE

## 2025-06-17 DIAGNOSIS — Z51.0 ENCOUNTER FOR ANTINEOPLASTIC RADIATION THERAPY: ICD-10-CM

## 2025-06-17 DIAGNOSIS — C61 MALIGNANT NEOPLASM OF PROSTATE (MULTI): ICD-10-CM

## 2025-06-17 DIAGNOSIS — F40.240 CLAUSTROPHOBIA: Primary | ICD-10-CM

## 2025-06-17 LAB
RAD ONC MSQ ACTUAL FRACTIONS DELIVERED: 3
RAD ONC MSQ ACTUAL SESSION DELIVERED DOSE: 800 CGRAY
RAD ONC MSQ ACTUAL TOTAL DOSE: 2400 CGRAY
RAD ONC MSQ ELAPSED DAYS: 7
RAD ONC MSQ LAST DATE: NORMAL
RAD ONC MSQ PRESCRIBED FRACTIONAL DOSE: 800 CGRAY
RAD ONC MSQ PRESCRIBED NUMBER OF FRACTIONS: 5
RAD ONC MSQ PRESCRIBED TECHNIQUE: NORMAL
RAD ONC MSQ PRESCRIBED TOTAL DOSE: 4000 CGRAY
RAD ONC MSQ PRESCRIPTION PATTERN COMMENT: NORMAL
RAD ONC MSQ START DATE: NORMAL
RAD ONC MSQ TREATMENT COURSE NUMBER: 1
RAD ONC MSQ TREATMENT SITE: NORMAL

## 2025-06-17 PROCEDURE — 77373 STRTCTC BDY RAD THER TX DLVR: CPT | Performed by: RADIOLOGY

## 2025-06-17 PROCEDURE — 2500000001 HC RX 250 WO HCPCS SELF ADMINISTERED DRUGS (ALT 637 FOR MEDICARE OP)

## 2025-06-17 RX ORDER — LORAZEPAM 0.5 MG/1
0.5 TABLET ORAL ONCE
Status: COMPLETED | OUTPATIENT
Start: 2025-06-17 | End: 2025-06-17

## 2025-06-17 RX ORDER — LORAZEPAM 0.5 MG/1
TABLET ORAL
Status: COMPLETED
Start: 2025-06-17 | End: 2025-06-17

## 2025-06-17 RX ADMIN — LORAZEPAM 0.5 MG: 0.5 TABLET ORAL at 11:06

## 2025-06-19 ENCOUNTER — HOSPITAL ENCOUNTER (OUTPATIENT)
Dept: RADIATION ONCOLOGY | Facility: HOSPITAL | Age: 75
Setting detail: RADIATION/ONCOLOGY SERIES
Discharge: HOME | End: 2025-06-19
Payer: MEDICARE

## 2025-06-19 DIAGNOSIS — Z51.0 ENCOUNTER FOR ANTINEOPLASTIC RADIATION THERAPY: ICD-10-CM

## 2025-06-19 DIAGNOSIS — C61 MALIGNANT NEOPLASM OF PROSTATE (MULTI): ICD-10-CM

## 2025-06-19 LAB
RAD ONC MSQ ACTUAL FRACTIONS DELIVERED: 4
RAD ONC MSQ ACTUAL SESSION DELIVERED DOSE: 800 CGRAY
RAD ONC MSQ ACTUAL TOTAL DOSE: 3200 CGRAY
RAD ONC MSQ ELAPSED DAYS: 9
RAD ONC MSQ LAST DATE: NORMAL
RAD ONC MSQ PRESCRIBED FRACTIONAL DOSE: 800 CGRAY
RAD ONC MSQ PRESCRIBED NUMBER OF FRACTIONS: 5
RAD ONC MSQ PRESCRIBED TECHNIQUE: NORMAL
RAD ONC MSQ PRESCRIBED TOTAL DOSE: 4000 CGRAY
RAD ONC MSQ PRESCRIPTION PATTERN COMMENT: NORMAL
RAD ONC MSQ START DATE: NORMAL
RAD ONC MSQ TREATMENT COURSE NUMBER: 1
RAD ONC MSQ TREATMENT SITE: NORMAL

## 2025-06-19 PROCEDURE — 77373 STRTCTC BDY RAD THER TX DLVR: CPT | Performed by: STUDENT IN AN ORGANIZED HEALTH CARE EDUCATION/TRAINING PROGRAM

## 2025-06-23 ENCOUNTER — SPECIALTY PHARMACY (OUTPATIENT)
Dept: PHARMACY | Facility: CLINIC | Age: 75
End: 2025-06-23

## 2025-06-23 ENCOUNTER — PHARMACY VISIT (OUTPATIENT)
Dept: PHARMACY | Facility: CLINIC | Age: 75
End: 2025-06-23
Payer: MEDICARE

## 2025-06-23 DIAGNOSIS — C61 PROSTATE CANCER (MULTI): Primary | ICD-10-CM

## 2025-06-24 ENCOUNTER — DOCUMENTATION (OUTPATIENT)
Dept: RADIATION ONCOLOGY | Facility: HOSPITAL | Age: 75
End: 2025-06-24
Payer: MEDICARE

## 2025-06-24 ENCOUNTER — HOSPITAL ENCOUNTER (OUTPATIENT)
Dept: RADIATION ONCOLOGY | Facility: HOSPITAL | Age: 75
Setting detail: RADIATION/ONCOLOGY SERIES
Discharge: HOME | End: 2025-06-24
Payer: MEDICARE

## 2025-06-24 ENCOUNTER — LAB (OUTPATIENT)
Dept: LAB | Facility: HOSPITAL | Age: 75
End: 2025-06-24
Payer: MEDICARE

## 2025-06-24 ENCOUNTER — DOCUMENTATION (OUTPATIENT)
Dept: RESEARCH | Age: 75
End: 2025-06-24
Payer: MEDICARE

## 2025-06-24 DIAGNOSIS — C61 PROSTATE CANCER (MULTI): ICD-10-CM

## 2025-06-24 DIAGNOSIS — Z51.0 ENCOUNTER FOR ANTINEOPLASTIC RADIATION THERAPY: ICD-10-CM

## 2025-06-24 DIAGNOSIS — C61 MALIGNANT NEOPLASM OF PROSTATE (MULTI): ICD-10-CM

## 2025-06-24 LAB
HOLD SPECIMEN: NORMAL
HOLD SPECIMEN: NORMAL
RAD ONC MSQ ACTUAL FRACTIONS DELIVERED: 5
RAD ONC MSQ ACTUAL SESSION DELIVERED DOSE: 800 CGRAY
RAD ONC MSQ ACTUAL TOTAL DOSE: 4000 CGRAY
RAD ONC MSQ ELAPSED DAYS: 14
RAD ONC MSQ LAST DATE: NORMAL
RAD ONC MSQ PRESCRIBED FRACTIONAL DOSE: 800 CGRAY
RAD ONC MSQ PRESCRIBED NUMBER OF FRACTIONS: 5
RAD ONC MSQ PRESCRIBED TECHNIQUE: NORMAL
RAD ONC MSQ PRESCRIBED TOTAL DOSE: 4000 CGRAY
RAD ONC MSQ PRESCRIPTION PATTERN COMMENT: NORMAL
RAD ONC MSQ START DATE: NORMAL
RAD ONC MSQ TREATMENT COURSE NUMBER: 1
RAD ONC MSQ TREATMENT SITE: NORMAL

## 2025-06-24 PROCEDURE — 77336 RADIATION PHYSICS CONSULT: CPT | Performed by: STUDENT IN AN ORGANIZED HEALTH CARE EDUCATION/TRAINING PROGRAM

## 2025-06-24 PROCEDURE — 36415 COLL VENOUS BLD VENIPUNCTURE: CPT

## 2025-06-24 PROCEDURE — 77373 STRTCTC BDY RAD THER TX DLVR: CPT | Performed by: RADIOLOGY

## 2025-06-24 NOTE — RESEARCH NOTES
STUDY: NRG-  VISIT: End of Treatment    Clinical Research Specialist saw patient in clinic today.    Correlatives for end of treatment time point were drawn.     Next appointment and contact information provided.    All questions answered to patient satisfaction.    SUMMER AMBROCIO  06/24/2025

## 2025-06-24 NOTE — PROGRESS NOTES
RADIATION COMPLETION OF THERAPY NOTE    Patient Name:  Salazar Jesus  MRN:  [id number]  :  1950    Radiation Oncologist: Milly Betancourt MD PhD  Primary Care Provider: Christopher D'Amico, DO    Brief History: Salazar Jesus is a 75 y.o. male with Cancer Staging   Prostate cancer (Multi)  Staging form: Prostate, AJCC 8th Edition  - Clinical stage from 10/15/2024: cT3b, cN0, PSA: 5.7, Grade Group: 3 - Signed by Milly Betancourt MD PhD on 12/10/2024  Prognostic indicators: Decipher 0.87 (high)      The patient completed radiotherapy as outlined below.    Radiation Treatment Summary:  , randomized to SBRT arm  Radiation Therapy    Treatment Period Technique Fraction Dose Fractions Total Dose   Course 1 6/10/2025-2025  (days elapsed: 14)         ProstSV 6/10/2025-2025 SBRT 800 / 800 cGy 5 / 5 4,000 / 4,000 cGy     Concurrent Systemic Therapy: planned for 18m ADT (Orgovyx, 25)     CTCAE Toxicity Overview:   Toxicity Assessment          6/10/2025    11:39   Toxicity Assessment   Adverse Events Reviewed (WDL) No (Exceptions to WDL)   Treatment Site Prostate RT   Diarrhea Grade 0   Fatigue Grade 0   Pain Grade 0   Proctitis Grade 0   Hematuria Grade 0   Urinary Incontinence Grade 1       intermittent urge incontinence (baseline)   Urinary Frequency Grade 1   Urinary Retention Grade 3       pt had greenlight 25   Urinary Tract Obstruction Grade 0   Urinary Tract Pain Grade 0   Urinary Urgency Grade 1       unchanged from baseline   Arthralgia Grade 0   Hot Flashes Grade 1       has had in the past, mild when had, none recently     Patient Disposition: Pt will follow-up per Study NRG- protocol with Naman Rodriguez CNP, in radiation department on 25.  Pt instructed to call our office with any questions or concerns.     Future Appointments   Date Time Provider Department Center   2025 10:00 AM Christopher D'Amico, DO [id number] [facility]   9/15/2025 11:40 AM Rosemary Singh MD  [id number] [facility]   9/30/2025 10:00 AM Naman Rodriguez APRN-CNP [id number] [facility]   10/16/2025  2:00 PM MD ELROY [id number] [facility]   11/25/2025 10:45 AM MD ELROY [id number] [facility]

## 2025-07-08 DIAGNOSIS — M1A.9XX0 CHRONIC GOUT WITHOUT TOPHUS, UNSPECIFIED CAUSE, UNSPECIFIED SITE: ICD-10-CM

## 2025-07-08 RX ORDER — FEBUXOSTAT 40 MG/1
40 TABLET, FILM COATED ORAL DAILY
Qty: 90 TABLET | Refills: 1 | Status: SHIPPED | OUTPATIENT
Start: 2025-07-08 | End: 2026-01-04

## 2025-07-16 DIAGNOSIS — F41.9 ANXIETY: Primary | ICD-10-CM

## 2025-07-16 RX ORDER — ALPRAZOLAM 0.5 MG/1
TABLET ORAL
COMMUNITY
Start: 2025-06-27

## 2025-07-16 RX ORDER — ESCITALOPRAM OXALATE 10 MG/1
10 TABLET ORAL DAILY
Qty: 30 TABLET | Refills: 5 | Status: SHIPPED | OUTPATIENT
Start: 2025-07-16 | End: 2026-01-12

## 2025-07-18 ENCOUNTER — PHARMACY VISIT (OUTPATIENT)
Dept: PHARMACY | Facility: CLINIC | Age: 75
End: 2025-07-18
Payer: MEDICARE

## 2025-07-18 ENCOUNTER — SPECIALTY PHARMACY (OUTPATIENT)
Dept: PHARMACY | Facility: CLINIC | Age: 75
End: 2025-07-18

## 2025-07-18 PROCEDURE — RXMED WILLOW AMBULATORY MEDICATION CHARGE

## 2025-07-29 ENCOUNTER — APPOINTMENT (OUTPATIENT)
Dept: PRIMARY CARE | Facility: CLINIC | Age: 75
End: 2025-07-29
Payer: MEDICARE

## 2025-07-29 VITALS
RESPIRATION RATE: 16 BRPM | DIASTOLIC BLOOD PRESSURE: 68 MMHG | BODY MASS INDEX: 24.11 KG/M2 | OXYGEN SATURATION: 97 % | HEIGHT: 66 IN | HEART RATE: 62 BPM | WEIGHT: 150 LBS | SYSTOLIC BLOOD PRESSURE: 136 MMHG

## 2025-07-29 DIAGNOSIS — R79.89 ABNORMAL THYROID SCREEN (BLOOD): ICD-10-CM

## 2025-07-29 DIAGNOSIS — D64.9 ANEMIA, UNSPECIFIED TYPE: ICD-10-CM

## 2025-07-29 DIAGNOSIS — G47.33 OSA (OBSTRUCTIVE SLEEP APNEA): ICD-10-CM

## 2025-07-29 DIAGNOSIS — G25.81 RESTLESS LEG SYNDROME: ICD-10-CM

## 2025-07-29 DIAGNOSIS — R35.1 BENIGN PROSTATIC HYPERPLASIA WITH NOCTURIA: ICD-10-CM

## 2025-07-29 DIAGNOSIS — I10 HYPERTENSION, UNSPECIFIED TYPE: Primary | ICD-10-CM

## 2025-07-29 DIAGNOSIS — E55.9 VITAMIN D DEFICIENCY: ICD-10-CM

## 2025-07-29 DIAGNOSIS — R53.83 OTHER FATIGUE: ICD-10-CM

## 2025-07-29 DIAGNOSIS — N25.81 SECONDARY HYPERPARATHYROIDISM OF RENAL ORIGIN (MULTI): ICD-10-CM

## 2025-07-29 DIAGNOSIS — E78.5 HYPERLIPIDEMIA, UNSPECIFIED HYPERLIPIDEMIA TYPE: ICD-10-CM

## 2025-07-29 DIAGNOSIS — Z00.00 MEDICARE ANNUAL WELLNESS VISIT, SUBSEQUENT: ICD-10-CM

## 2025-07-29 DIAGNOSIS — Z99.2 END-STAGE RENAL DISEASE ON HEMODIALYSIS (MULTI): ICD-10-CM

## 2025-07-29 DIAGNOSIS — N40.1 BENIGN PROSTATIC HYPERPLASIA WITH NOCTURIA: ICD-10-CM

## 2025-07-29 DIAGNOSIS — M1A.9XX0 CHRONIC GOUT WITHOUT TOPHUS, UNSPECIFIED CAUSE, UNSPECIFIED SITE: ICD-10-CM

## 2025-07-29 DIAGNOSIS — Z00.00 WELLNESS EXAMINATION: ICD-10-CM

## 2025-07-29 DIAGNOSIS — E03.9 HYPOTHYROIDISM, UNSPECIFIED TYPE: ICD-10-CM

## 2025-07-29 DIAGNOSIS — N18.6 END-STAGE RENAL DISEASE ON HEMODIALYSIS (MULTI): ICD-10-CM

## 2025-07-29 ASSESSMENT — PATIENT HEALTH QUESTIONNAIRE - PHQ9
2. FEELING DOWN, DEPRESSED OR HOPELESS: NOT AT ALL
SUM OF ALL RESPONSES TO PHQ9 QUESTIONS 1 AND 2: 0
1. LITTLE INTEREST OR PLEASURE IN DOING THINGS: NOT AT ALL

## 2025-07-29 ASSESSMENT — ACTIVITIES OF DAILY LIVING (ADL)
MANAGING_FINANCES: INDEPENDENT
DRESSING: INDEPENDENT
DOING_HOUSEWORK: INDEPENDENT
TAKING_MEDICATION: INDEPENDENT
BATHING: INDEPENDENT
GROCERY_SHOPPING: INDEPENDENT
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
## 2025-07-30 LAB
25(OH)D3+25(OH)D2 SERPL-MCNC: 37 NG/ML (ref 30–100)
CHOLEST SERPL-MCNC: 132 MG/DL
CHOLEST/HDLC SERPL: 4.1 (CALC)
FERRITIN SERPL-MCNC: 819 NG/ML (ref 24–380)
HDLC SERPL-MCNC: 32 MG/DL
IRON SATN MFR SERPL: 35 % (CALC) (ref 20–48)
IRON SERPL-MCNC: 77 MCG/DL (ref 50–180)
LDLC SERPL CALC-MCNC: 56 MG/DL (CALC)
NONHDLC SERPL-MCNC: 100 MG/DL (CALC)
T4 FREE SERPL-MCNC: 1.6 NG/DL (ref 0.8–1.8)
TIBC SERPL-MCNC: 221 MCG/DL (CALC) (ref 250–425)
TRIGL SERPL-MCNC: 367 MG/DL
TSH SERPL-ACNC: 5.7 MIU/L (ref 0.4–4.5)
URATE SERPL-MCNC: 2.3 MG/DL (ref 4–8)

## 2025-08-15 ENCOUNTER — SPECIALTY PHARMACY (OUTPATIENT)
Dept: PHARMACY | Facility: CLINIC | Age: 75
End: 2025-08-15

## 2025-08-15 PROCEDURE — RXMED WILLOW AMBULATORY MEDICATION CHARGE

## 2025-08-16 ENCOUNTER — PHARMACY VISIT (OUTPATIENT)
Dept: PHARMACY | Facility: CLINIC | Age: 75
End: 2025-08-16
Payer: MEDICARE

## 2025-08-21 ENCOUNTER — TELEPHONE (OUTPATIENT)
Dept: RADIATION ONCOLOGY | Facility: HOSPITAL | Age: 75
End: 2025-08-21
Payer: MEDICARE

## 2025-08-21 ENCOUNTER — SPECIALTY PHARMACY (OUTPATIENT)
Dept: PHARMACY | Facility: CLINIC | Age: 75
End: 2025-08-21

## 2025-08-25 DIAGNOSIS — K21.9 GASTRO-ESOPHAGEAL REFLUX DISEASE WITHOUT ESOPHAGITIS: ICD-10-CM

## 2025-08-25 RX ORDER — SODIUM BICARBONATE 650 MG/1
650 TABLET ORAL 2 TIMES DAILY
Qty: 180 TABLET | Refills: 1 | Status: SHIPPED | OUTPATIENT
Start: 2025-08-25

## 2025-09-15 ENCOUNTER — APPOINTMENT (OUTPATIENT)
Dept: UROLOGY | Facility: CLINIC | Age: 75
End: 2025-09-15
Payer: MEDICARE

## 2025-12-04 ENCOUNTER — APPOINTMENT (OUTPATIENT)
Dept: PRIMARY CARE | Facility: CLINIC | Age: 75
End: 2025-12-04
Payer: MEDICARE

## (undated) DEVICE — Device

## (undated) DEVICE — WATER, STERILE, FOR IRRIGATION USP, 500MI AQUALITE

## (undated) DEVICE — APPLICATOR, CHLORAPREP, W/ORANGE TINT, 26ML

## (undated) DEVICE — MASK, AURAGAIN, LARYN, SIZE 4.0

## (undated) DEVICE — IRRIGATION SET, CYSTOSCOPY, F/CONSTANT/INTERMITTENT, 8 GTT/CC, 77 IN

## (undated) DEVICE — SPONGE, GAUZE, AVANT, STERILE, NONWOVEN, 4PLY, 4 X 4, STANDARD

## (undated) DEVICE — GLOVE, SURGICAL, PROTEXIS PI , 7.5, PF, LF

## (undated) DEVICE — FIBER, MOXY, LIQUID COOLED

## (undated) DEVICE — GLOVE, SURGICAL, PROTEXIS PI , 6.5, PF, LF

## (undated) DEVICE — MASK, AURASTRAIGHT, LARYN, SIZE 5

## (undated) DEVICE — DRESSING, TELFA, 3X4

## (undated) DEVICE — BLANKET, LOWER BODY, VHA PLUS, ADULT

## (undated) DEVICE — GOWN, SURGICAL, SIRUS, NON REINFORCED, LARGE

## (undated) DEVICE — SOLUTION, IRRIGATION, X RX SODIUM CHL 0.9%, 1000ML BTL

## (undated) DEVICE — MASK, AURASTRAIGHT, LARYN, SIZE 3

## (undated) DEVICE — CATHETER, URETHRAL, FOLEY, 2 WAY, BARDEX LUBRICATH, SHORT LENGTH, 22 FR, 5 CC, LATEX

## (undated) DEVICE — BAG, DRAINAGE, ANTI-REFLUX CHAMBER, 2000ML

## (undated) DEVICE — SYRINGE, 50 CC, IRRIGATION, CATHETER TIP, DISPOSABLE, STERILE, LF

## (undated) DEVICE — SYRINGE, 60 CC, IRRIGATION, TOOMEY TIP

## (undated) DEVICE — 20GA X 15CM, CHIBA-STYLE NEEDLE

## (undated) DEVICE — SYSTEM, SPACEOAR VUE, HYDROGEL

## (undated) DEVICE — SYRINGE, TOOMEY, IRRIGATION, 60ML, INDIVIDUAL WRAP, STERILE

## (undated) DEVICE — 20FR, 2-WAY, 30CC BARDEX LUBRICATH LATEX FOLEY CATHETER, TIEMANN MODEL, MEDIUM OLIVE COUDE TIP, SINGLE DRAINAGE EYE